# Patient Record
Sex: MALE | Race: WHITE
[De-identification: names, ages, dates, MRNs, and addresses within clinical notes are randomized per-mention and may not be internally consistent; named-entity substitution may affect disease eponyms.]

---

## 2017-01-05 ENCOUNTER — APPOINTMENT (OUTPATIENT)
Dept: PODIATRY | Facility: CLINIC | Age: 62
End: 2017-01-05

## 2017-01-19 ENCOUNTER — APPOINTMENT (OUTPATIENT)
Dept: PODIATRY | Facility: CLINIC | Age: 62
End: 2017-01-19

## 2017-02-02 ENCOUNTER — APPOINTMENT (OUTPATIENT)
Dept: PODIATRY | Facility: CLINIC | Age: 62
End: 2017-02-02

## 2017-02-02 VITALS — BODY MASS INDEX: 31.17 KG/M2 | HEIGHT: 76 IN | WEIGHT: 256 LBS

## 2017-02-16 ENCOUNTER — APPOINTMENT (OUTPATIENT)
Dept: PODIATRY | Facility: CLINIC | Age: 62
End: 2017-02-16

## 2017-02-16 VITALS — HEIGHT: 75 IN | WEIGHT: 253 LBS | BODY MASS INDEX: 31.46 KG/M2

## 2017-03-23 ENCOUNTER — APPOINTMENT (OUTPATIENT)
Dept: PODIATRY | Facility: CLINIC | Age: 62
End: 2017-03-23

## 2017-03-23 VITALS — BODY MASS INDEX: 31.33 KG/M2 | WEIGHT: 252 LBS | HEIGHT: 75 IN

## 2017-04-27 ENCOUNTER — OUTPATIENT (OUTPATIENT)
Dept: OUTPATIENT SERVICES | Facility: HOSPITAL | Age: 62
LOS: 1 days | Discharge: HOME | End: 2017-04-27

## 2017-04-27 ENCOUNTER — APPOINTMENT (OUTPATIENT)
Dept: PODIATRY | Facility: CLINIC | Age: 62
End: 2017-04-27

## 2017-06-28 DIAGNOSIS — L97.519 NON-PRESSURE CHRONIC ULCER OF OTHER PART OF RIGHT FOOT WITH UNSPECIFIED SEVERITY: ICD-10-CM

## 2017-06-28 DIAGNOSIS — I87.2 VENOUS INSUFFICIENCY (CHRONIC) (PERIPHERAL): ICD-10-CM

## 2017-06-28 DIAGNOSIS — B35.1 TINEA UNGUIUM: ICD-10-CM

## 2017-06-28 DIAGNOSIS — L85.3 XEROSIS CUTIS: ICD-10-CM

## 2017-06-28 DIAGNOSIS — M79.672 PAIN IN LEFT FOOT: ICD-10-CM

## 2017-06-28 DIAGNOSIS — M79.671 PAIN IN RIGHT FOOT: ICD-10-CM

## 2017-07-06 ENCOUNTER — APPOINTMENT (OUTPATIENT)
Dept: PODIATRY | Facility: CLINIC | Age: 62
End: 2017-07-06

## 2017-07-06 ENCOUNTER — OUTPATIENT (OUTPATIENT)
Dept: OUTPATIENT SERVICES | Facility: HOSPITAL | Age: 62
LOS: 1 days | Discharge: HOME | End: 2017-07-06

## 2017-07-06 VITALS — SYSTOLIC BLOOD PRESSURE: 132 MMHG | HEART RATE: 72 BPM | DIASTOLIC BLOOD PRESSURE: 77 MMHG

## 2017-07-12 DIAGNOSIS — L97.519 NON-PRESSURE CHRONIC ULCER OF OTHER PART OF RIGHT FOOT WITH UNSPECIFIED SEVERITY: ICD-10-CM

## 2017-07-12 DIAGNOSIS — L85.3 XEROSIS CUTIS: ICD-10-CM

## 2017-07-12 DIAGNOSIS — B35.1 TINEA UNGUIUM: ICD-10-CM

## 2017-07-12 DIAGNOSIS — I87.2 VENOUS INSUFFICIENCY (CHRONIC) (PERIPHERAL): ICD-10-CM

## 2017-09-07 ENCOUNTER — OUTPATIENT (OUTPATIENT)
Dept: OUTPATIENT SERVICES | Facility: HOSPITAL | Age: 62
LOS: 1 days | Discharge: HOME | End: 2017-09-07

## 2017-09-07 ENCOUNTER — APPOINTMENT (OUTPATIENT)
Dept: PODIATRY | Facility: CLINIC | Age: 62
End: 2017-09-07

## 2017-09-07 VITALS — HEART RATE: 77 BPM | SYSTOLIC BLOOD PRESSURE: 117 MMHG | DIASTOLIC BLOOD PRESSURE: 74 MMHG

## 2017-09-07 VITALS — BODY MASS INDEX: 31.33 KG/M2 | WEIGHT: 252 LBS | HEIGHT: 75 IN

## 2017-09-13 DIAGNOSIS — L85.3 XEROSIS CUTIS: ICD-10-CM

## 2017-09-13 DIAGNOSIS — L97.519 NON-PRESSURE CHRONIC ULCER OF OTHER PART OF RIGHT FOOT WITH UNSPECIFIED SEVERITY: ICD-10-CM

## 2017-09-13 DIAGNOSIS — B35.1 TINEA UNGUIUM: ICD-10-CM

## 2017-09-13 DIAGNOSIS — I87.2 VENOUS INSUFFICIENCY (CHRONIC) (PERIPHERAL): ICD-10-CM

## 2017-09-13 DIAGNOSIS — M79.671 PAIN IN RIGHT FOOT: ICD-10-CM

## 2017-09-13 DIAGNOSIS — M79.672 PAIN IN LEFT FOOT: ICD-10-CM

## 2017-10-27 ENCOUNTER — APPOINTMENT (OUTPATIENT)
Dept: GASTROENTEROLOGY | Facility: CLINIC | Age: 62
End: 2017-10-27

## 2017-10-27 ENCOUNTER — OUTPATIENT (OUTPATIENT)
Dept: OUTPATIENT SERVICES | Facility: HOSPITAL | Age: 62
LOS: 1 days | Discharge: HOME | End: 2017-10-27

## 2017-10-27 VITALS — BODY MASS INDEX: 31.71 KG/M2 | WEIGHT: 255 LBS | HEIGHT: 75 IN

## 2017-11-09 ENCOUNTER — APPOINTMENT (OUTPATIENT)
Dept: PODIATRY | Facility: CLINIC | Age: 62
End: 2017-11-09

## 2017-11-09 ENCOUNTER — OUTPATIENT (OUTPATIENT)
Dept: OUTPATIENT SERVICES | Facility: HOSPITAL | Age: 62
LOS: 1 days | Discharge: HOME | End: 2017-11-09

## 2017-11-09 VITALS
BODY MASS INDEX: 30.44 KG/M2 | HEART RATE: 66 BPM | WEIGHT: 250 LBS | SYSTOLIC BLOOD PRESSURE: 105 MMHG | DIASTOLIC BLOOD PRESSURE: 69 MMHG | HEIGHT: 76 IN

## 2017-11-10 DIAGNOSIS — L85.3 XEROSIS CUTIS: ICD-10-CM

## 2017-11-10 DIAGNOSIS — B35.1 TINEA UNGUIUM: ICD-10-CM

## 2017-11-10 DIAGNOSIS — M79.672 PAIN IN LEFT FOOT: ICD-10-CM

## 2017-11-10 DIAGNOSIS — M79.671 PAIN IN RIGHT FOOT: ICD-10-CM

## 2017-11-10 DIAGNOSIS — I87.2 VENOUS INSUFFICIENCY (CHRONIC) (PERIPHERAL): ICD-10-CM

## 2017-11-10 DIAGNOSIS — L97.519 NON-PRESSURE CHRONIC ULCER OF OTHER PART OF RIGHT FOOT WITH UNSPECIFIED SEVERITY: ICD-10-CM

## 2018-01-11 ENCOUNTER — OUTPATIENT (OUTPATIENT)
Dept: OUTPATIENT SERVICES | Facility: HOSPITAL | Age: 63
LOS: 1 days | Discharge: HOME | End: 2018-01-11

## 2018-01-12 ENCOUNTER — OUTPATIENT (OUTPATIENT)
Dept: OUTPATIENT SERVICES | Facility: HOSPITAL | Age: 63
LOS: 1 days | Discharge: HOME | End: 2018-01-12

## 2018-01-22 ENCOUNTER — OUTPATIENT (OUTPATIENT)
Dept: OUTPATIENT SERVICES | Facility: HOSPITAL | Age: 63
LOS: 1 days | Discharge: HOME | End: 2018-01-22

## 2018-01-22 ENCOUNTER — APPOINTMENT (OUTPATIENT)
Dept: PODIATRY | Facility: CLINIC | Age: 63
End: 2018-01-22

## 2018-01-22 VITALS
DIASTOLIC BLOOD PRESSURE: 82 MMHG | HEART RATE: 83 BPM | WEIGHT: 250 LBS | HEIGHT: 76 IN | BODY MASS INDEX: 30.44 KG/M2 | SYSTOLIC BLOOD PRESSURE: 131 MMHG

## 2018-01-22 DIAGNOSIS — L85.3 XEROSIS CUTIS: ICD-10-CM

## 2018-01-26 DIAGNOSIS — M79.672 PAIN IN LEFT FOOT: ICD-10-CM

## 2018-01-26 DIAGNOSIS — I87.2 VENOUS INSUFFICIENCY (CHRONIC) (PERIPHERAL): ICD-10-CM

## 2018-01-26 DIAGNOSIS — L97.519 NON-PRESSURE CHRONIC ULCER OF OTHER PART OF RIGHT FOOT WITH UNSPECIFIED SEVERITY: ICD-10-CM

## 2018-01-26 DIAGNOSIS — B35.1 TINEA UNGUIUM: ICD-10-CM

## 2018-01-26 DIAGNOSIS — M79.671 PAIN IN RIGHT FOOT: ICD-10-CM

## 2018-01-26 DIAGNOSIS — L85.3 XEROSIS CUTIS: ICD-10-CM

## 2018-03-19 ENCOUNTER — OUTPATIENT (OUTPATIENT)
Dept: OUTPATIENT SERVICES | Facility: HOSPITAL | Age: 63
LOS: 1 days | Discharge: HOME | End: 2018-03-19

## 2018-03-19 ENCOUNTER — APPOINTMENT (OUTPATIENT)
Dept: PODIATRY | Facility: CLINIC | Age: 63
End: 2018-03-19
Payer: MEDICAID

## 2018-03-19 VITALS
SYSTOLIC BLOOD PRESSURE: 132 MMHG | WEIGHT: 256 LBS | HEIGHT: 76 IN | DIASTOLIC BLOOD PRESSURE: 80 MMHG | HEART RATE: 74 BPM | BODY MASS INDEX: 31.17 KG/M2

## 2018-03-19 PROCEDURE — 11721 DEBRIDE NAIL 6 OR MORE: CPT | Mod: NC

## 2018-03-23 DIAGNOSIS — B35.1 TINEA UNGUIUM: ICD-10-CM

## 2018-03-23 DIAGNOSIS — G89.29 OTHER CHRONIC PAIN: ICD-10-CM

## 2018-05-21 ENCOUNTER — OUTPATIENT (OUTPATIENT)
Dept: OUTPATIENT SERVICES | Facility: HOSPITAL | Age: 63
LOS: 1 days | Discharge: HOME | End: 2018-05-21

## 2018-05-21 ENCOUNTER — APPOINTMENT (OUTPATIENT)
Dept: PODIATRY | Facility: CLINIC | Age: 63
End: 2018-05-21
Payer: MEDICAID

## 2018-05-21 VITALS
SYSTOLIC BLOOD PRESSURE: 130 MMHG | BODY MASS INDEX: 30.69 KG/M2 | WEIGHT: 252 LBS | HEIGHT: 76 IN | HEART RATE: 77 BPM | DIASTOLIC BLOOD PRESSURE: 78 MMHG

## 2018-05-21 DIAGNOSIS — M79.674 PAIN IN RIGHT TOE(S): ICD-10-CM

## 2018-05-21 PROCEDURE — 99213 OFFICE O/P EST LOW 20 MIN: CPT

## 2018-05-24 ENCOUNTER — OTHER (OUTPATIENT)
Age: 63
End: 2018-05-24

## 2018-05-30 ENCOUNTER — APPOINTMENT (OUTPATIENT)
Dept: VASCULAR SURGERY | Facility: CLINIC | Age: 63
End: 2018-05-30
Payer: MEDICAID

## 2018-05-30 DIAGNOSIS — Q82.8 OTHER SPECIFIED CONGENITAL MALFORMATIONS OF SKIN: ICD-10-CM

## 2018-05-30 DIAGNOSIS — M79.675 PAIN IN LEFT TOE(S): ICD-10-CM

## 2018-05-30 DIAGNOSIS — M79.674 PAIN IN RIGHT TOE(S): ICD-10-CM

## 2018-05-30 PROCEDURE — 99203 OFFICE O/P NEW LOW 30 MIN: CPT

## 2018-05-31 ENCOUNTER — OUTPATIENT (OUTPATIENT)
Dept: OUTPATIENT SERVICES | Facility: HOSPITAL | Age: 63
LOS: 1 days | Discharge: HOME | End: 2018-05-31

## 2018-05-31 DIAGNOSIS — L89.899 PRESSURE ULCER OF OTHER SITE, UNSPECIFIED STAGE: ICD-10-CM

## 2018-06-04 ENCOUNTER — OUTPATIENT (OUTPATIENT)
Dept: OUTPATIENT SERVICES | Facility: HOSPITAL | Age: 63
LOS: 1 days | Discharge: HOME | End: 2018-06-04

## 2018-06-04 ENCOUNTER — APPOINTMENT (OUTPATIENT)
Dept: PODIATRY | Facility: CLINIC | Age: 63
End: 2018-06-04
Payer: MEDICAID

## 2018-06-04 VITALS
BODY MASS INDEX: 31.05 KG/M2 | DIASTOLIC BLOOD PRESSURE: 77 MMHG | WEIGHT: 255 LBS | SYSTOLIC BLOOD PRESSURE: 125 MMHG | HEIGHT: 76 IN | HEART RATE: 80 BPM | RESPIRATION RATE: 18 BRPM

## 2018-06-04 PROCEDURE — 10060 I&D ABSCESS SIMPLE/SINGLE: CPT

## 2018-06-04 RX ORDER — SILVER SULFADIAZINE 10 MG/G
1 CREAM TOPICAL DAILY
Qty: 1 | Refills: 2 | Status: ACTIVE | COMMUNITY
Start: 2018-06-04 | End: 1900-01-01

## 2018-06-12 DIAGNOSIS — L02.611 CUTANEOUS ABSCESS OF RIGHT FOOT: ICD-10-CM

## 2018-06-18 ENCOUNTER — OUTPATIENT (OUTPATIENT)
Dept: OUTPATIENT SERVICES | Facility: HOSPITAL | Age: 63
LOS: 1 days | Discharge: HOME | End: 2018-06-18

## 2018-06-18 ENCOUNTER — APPOINTMENT (OUTPATIENT)
Dept: PODIATRY | Facility: CLINIC | Age: 63
End: 2018-06-18
Payer: MEDICAID

## 2018-06-18 VITALS
HEIGHT: 76 IN | BODY MASS INDEX: 30.93 KG/M2 | SYSTOLIC BLOOD PRESSURE: 122 MMHG | WEIGHT: 254 LBS | DIASTOLIC BLOOD PRESSURE: 79 MMHG | HEART RATE: 81 BPM

## 2018-06-18 LAB — BACTERIA WND CULT: ABNORMAL

## 2018-06-18 PROCEDURE — 11042 DBRDMT SUBQ TIS 1ST 20SQCM/<: CPT

## 2018-06-20 ENCOUNTER — APPOINTMENT (OUTPATIENT)
Dept: VASCULAR SURGERY | Facility: CLINIC | Age: 63
End: 2018-06-20
Payer: MEDICAID

## 2018-06-20 PROCEDURE — 99213 OFFICE O/P EST LOW 20 MIN: CPT

## 2018-06-26 ENCOUNTER — OUTPATIENT (OUTPATIENT)
Dept: OUTPATIENT SERVICES | Facility: HOSPITAL | Age: 63
LOS: 1 days | Discharge: HOME | End: 2018-06-26

## 2018-06-26 VITALS
OXYGEN SATURATION: 99 % | HEIGHT: 76 IN | SYSTOLIC BLOOD PRESSURE: 134 MMHG | DIASTOLIC BLOOD PRESSURE: 73 MMHG | WEIGHT: 265.88 LBS | HEART RATE: 63 BPM | RESPIRATION RATE: 17 BRPM

## 2018-06-26 DIAGNOSIS — Z01.818 ENCOUNTER FOR OTHER PREPROCEDURAL EXAMINATION: ICD-10-CM

## 2018-06-26 DIAGNOSIS — I73.9 PERIPHERAL VASCULAR DISEASE, UNSPECIFIED: ICD-10-CM

## 2018-06-26 LAB
ANION GAP SERPL CALC-SCNC: 16 MMOL/L — HIGH (ref 7–14)
APTT BLD: 36.5 SEC — SIGNIFICANT CHANGE UP (ref 27–39.2)
BASOPHILS # BLD AUTO: 0.01 K/UL — SIGNIFICANT CHANGE UP (ref 0–0.2)
BASOPHILS NFR BLD AUTO: 0.1 % — SIGNIFICANT CHANGE UP (ref 0–1)
BUN SERPL-MCNC: 14 MG/DL — SIGNIFICANT CHANGE UP (ref 10–20)
CALCIUM SERPL-MCNC: 9.3 MG/DL — SIGNIFICANT CHANGE UP (ref 8.5–10.1)
CHLORIDE SERPL-SCNC: 98 MMOL/L — SIGNIFICANT CHANGE UP (ref 98–110)
CO2 SERPL-SCNC: 26 MMOL/L — SIGNIFICANT CHANGE UP (ref 17–32)
CREAT SERPL-MCNC: 0.8 MG/DL — SIGNIFICANT CHANGE UP (ref 0.7–1.5)
EOSINOPHIL # BLD AUTO: 0.06 K/UL — SIGNIFICANT CHANGE UP (ref 0–0.7)
EOSINOPHIL NFR BLD AUTO: 0.8 % — SIGNIFICANT CHANGE UP (ref 0–8)
GLUCOSE SERPL-MCNC: 90 MG/DL — SIGNIFICANT CHANGE UP (ref 70–99)
HCT VFR BLD CALC: 42.1 % — SIGNIFICANT CHANGE UP (ref 42–52)
HGB BLD-MCNC: 14.5 G/DL — SIGNIFICANT CHANGE UP (ref 14–18)
IMM GRANULOCYTES NFR BLD AUTO: 0.3 % — SIGNIFICANT CHANGE UP (ref 0.1–0.3)
INR BLD: 1.17 RATIO — SIGNIFICANT CHANGE UP (ref 0.65–1.3)
LYMPHOCYTES # BLD AUTO: 2.11 K/UL — SIGNIFICANT CHANGE UP (ref 1.2–3.4)
LYMPHOCYTES # BLD AUTO: 26.4 % — SIGNIFICANT CHANGE UP (ref 20.5–51.1)
MCHC RBC-ENTMCNC: 30.3 PG — SIGNIFICANT CHANGE UP (ref 27–31)
MCHC RBC-ENTMCNC: 34.4 G/DL — SIGNIFICANT CHANGE UP (ref 32–37)
MCV RBC AUTO: 88.1 FL — SIGNIFICANT CHANGE UP (ref 80–94)
MONOCYTES # BLD AUTO: 0.75 K/UL — HIGH (ref 0.1–0.6)
MONOCYTES NFR BLD AUTO: 9.4 % — HIGH (ref 1.7–9.3)
NEUTROPHILS # BLD AUTO: 5.03 K/UL — SIGNIFICANT CHANGE UP (ref 1.4–6.5)
NEUTROPHILS NFR BLD AUTO: 63 % — SIGNIFICANT CHANGE UP (ref 42.2–75.2)
NRBC # BLD: 0 /100 WBCS — SIGNIFICANT CHANGE UP (ref 0–0)
PLATELET # BLD AUTO: 229 K/UL — SIGNIFICANT CHANGE UP (ref 130–400)
POTASSIUM SERPL-MCNC: 3.9 MMOL/L — SIGNIFICANT CHANGE UP (ref 3.5–5)
POTASSIUM SERPL-SCNC: 3.9 MMOL/L — SIGNIFICANT CHANGE UP (ref 3.5–5)
PROTHROM AB SERPL-ACNC: 12.6 SEC — SIGNIFICANT CHANGE UP (ref 9.95–12.87)
RBC # BLD: 4.78 M/UL — SIGNIFICANT CHANGE UP (ref 4.7–6.1)
RBC # FLD: 13.2 % — SIGNIFICANT CHANGE UP (ref 11.5–14.5)
SODIUM SERPL-SCNC: 140 MMOL/L — SIGNIFICANT CHANGE UP (ref 135–146)
WBC # BLD: 7.98 K/UL — SIGNIFICANT CHANGE UP (ref 4.8–10.8)
WBC # FLD AUTO: 7.98 K/UL — SIGNIFICANT CHANGE UP (ref 4.8–10.8)

## 2018-06-26 NOTE — H&P PST ADULT - REASON FOR ADMISSION
PT RESIDES AT  GROUP [facility] AT [facility], [place].   PT ARRIVED BY HIMSELF. PT DENIES HAVING SOB, CP, PALPITATIONS, DYSURIA, UTI, URI AT PRESENT  EXERCISE TOLERANCE  2-3 FOS NO SOB.  PT USES A WALKER.  PT DENIES HAVING ANY RASHES, BRUISES OR OPEN WOUNDS AT PRESENT.  AS PER THE PT, THIS IS HIS/HER COMPLETE MEDICAL AND SURGICAL HX, INCLUDING MEDICATIONS PRESCRIBED AND OVER THE COUNTER  PT PRESENTS TO PAST WITH H/O-  RIGHT LOWER EXTREMITY DISCOMFORT.

## 2018-06-26 NOTE — H&P PST ADULT - ADDITIONAL PE
rt foot ulcer- covered with dressing.   no ozing noted.   pt using silver sulfa ointment daily.    as p/pt -- foot ulcer is healing.

## 2018-06-26 NOTE — H&P PST ADULT - EXTREMITIES COMMENTS
RT ANKLE - 45 DEGREES TO THE RIGHT.   PT DX WITH CHARCOT RT ANKLE. RT ANKLE - 45 DEGREES TO THE RIGHT.   PT DX WITH CHARCOT RT ANKLE.  B/L ANKLE +2-PITTING EDEMA.

## 2018-06-26 NOTE — H&P PST ADULT - NSANTHOSAYNRD_GEN_A_CORE
No. GLO screening performed.  STOP BANG Legend: 0-2 = LOW Risk; 3-4 = INTERMEDIATE Risk; 5-8 = HIGH Risk

## 2018-07-02 ENCOUNTER — OUTPATIENT (OUTPATIENT)
Dept: OUTPATIENT SERVICES | Facility: HOSPITAL | Age: 63
LOS: 1 days | Discharge: HOME | End: 2018-07-02

## 2018-07-02 ENCOUNTER — APPOINTMENT (OUTPATIENT)
Dept: PODIATRY | Facility: CLINIC | Age: 63
End: 2018-07-02
Payer: MEDICAID

## 2018-07-02 VITALS
SYSTOLIC BLOOD PRESSURE: 127 MMHG | HEIGHT: 76 IN | HEART RATE: 83 BPM | BODY MASS INDEX: 30.44 KG/M2 | DIASTOLIC BLOOD PRESSURE: 82 MMHG | WEIGHT: 250 LBS

## 2018-07-02 PROCEDURE — 97597 DBRDMT OPN WND 1ST 20 CM/<: CPT | Mod: NC

## 2018-07-09 ENCOUNTER — OUTPATIENT (OUTPATIENT)
Dept: OUTPATIENT SERVICES | Facility: HOSPITAL | Age: 63
LOS: 1 days | Discharge: HOME | End: 2018-07-09

## 2018-07-09 ENCOUNTER — APPOINTMENT (OUTPATIENT)
Dept: VASCULAR SURGERY | Facility: HOSPITAL | Age: 63
End: 2018-07-09
Payer: MEDICAID

## 2018-07-09 VITALS
DIASTOLIC BLOOD PRESSURE: 80 MMHG | HEART RATE: 58 BPM | SYSTOLIC BLOOD PRESSURE: 132 MMHG | RESPIRATION RATE: 18 BRPM | OXYGEN SATURATION: 99 %

## 2018-07-09 VITALS
WEIGHT: 255.07 LBS | TEMPERATURE: 98 F | RESPIRATION RATE: 20 BRPM | DIASTOLIC BLOOD PRESSURE: 79 MMHG | HEART RATE: 71 BPM | HEIGHT: 76 IN | SYSTOLIC BLOOD PRESSURE: 130 MMHG

## 2018-07-09 LAB
ABO RH CONFIRMATION: SIGNIFICANT CHANGE UP
BLD GP AB SCN SERPL QL: SIGNIFICANT CHANGE UP
TYPE + AB SCN PNL BLD: SIGNIFICANT CHANGE UP

## 2018-07-09 PROCEDURE — 76937 US GUIDE VASCULAR ACCESS: CPT | Mod: 26

## 2018-07-09 PROCEDURE — 36247 INS CATH ABD/L-EXT ART 3RD: CPT | Mod: RT

## 2018-07-09 PROCEDURE — 75710 ARTERY X-RAYS ARM/LEG: CPT | Mod: 26

## 2018-07-09 PROCEDURE — 75625 CONTRAST EXAM ABDOMINL AORTA: CPT | Mod: 26

## 2018-07-09 RX ORDER — OXYCODONE AND ACETAMINOPHEN 5; 325 MG/1; MG/1
1 TABLET ORAL EVERY 4 HOURS
Qty: 0 | Refills: 0 | Status: DISCONTINUED | OUTPATIENT
Start: 2018-07-09 | End: 2018-07-10

## 2018-07-09 RX ORDER — SODIUM CHLORIDE 9 MG/ML
1000 INJECTION, SOLUTION INTRAVENOUS
Qty: 0 | Refills: 0 | Status: DISCONTINUED | OUTPATIENT
Start: 2018-07-09 | End: 2018-07-10

## 2018-07-09 RX ORDER — MORPHINE SULFATE 50 MG/1
4 CAPSULE, EXTENDED RELEASE ORAL
Qty: 0 | Refills: 0 | Status: DISCONTINUED | OUTPATIENT
Start: 2018-07-09 | End: 2018-07-10

## 2018-07-09 RX ORDER — OXYCODONE AND ACETAMINOPHEN 5; 325 MG/1; MG/1
2 TABLET ORAL EVERY 6 HOURS
Qty: 0 | Refills: 0 | Status: DISCONTINUED | OUTPATIENT
Start: 2018-07-09 | End: 2018-07-10

## 2018-07-09 RX ORDER — MORPHINE SULFATE 50 MG/1
2 CAPSULE, EXTENDED RELEASE ORAL
Qty: 0 | Refills: 0 | Status: DISCONTINUED | OUTPATIENT
Start: 2018-07-09 | End: 2018-07-10

## 2018-07-09 RX ORDER — ONDANSETRON 8 MG/1
4 TABLET, FILM COATED ORAL ONCE
Qty: 0 | Refills: 0 | Status: DISCONTINUED | OUTPATIENT
Start: 2018-07-09 | End: 2018-07-10

## 2018-07-09 RX ADMIN — SODIUM CHLORIDE 100 MILLILITER(S): 9 INJECTION, SOLUTION INTRAVENOUS at 09:00

## 2018-07-09 NOTE — ASU DISCHARGE PLAN (ADULT/PEDIATRIC). - NOTIFY
Pain not relieved by Medications/Bleeding that does not stop/Numbness, color, or temperature change to extremity/Swelling that continues/Fever greater than 101

## 2018-07-09 NOTE — PRE-ANESTHESIA EVALUATION ADULT - NSANTHADDINFOFT_GEN_ALL_CORE
MAC vs general. discussed with the patient all the risks, benefits, alternatives, complications. all questions answered. willing to proceed

## 2018-07-09 NOTE — BRIEF OPERATIVE NOTE - PROCEDURE
<<-----Click on this checkbox to enter Procedure Angiogram, peripheral  07/09/2018  Right leg angiogram via US-guided left femoral access; diagnostic third-order selective angiogram; perclose closure of arteriotomy  Active  SAMUEL

## 2018-07-09 NOTE — CHART NOTE - NSCHARTNOTEFT_GEN_A_CORE
PACU ANESTHESIA ADMISSION NOTE      Procedure:   Post op diagnosis:      ____  Intubated  TV:______       Rate: ______      FiO2: ______    _x___  Patent Airway    _x___  Full return of protective reflexes    _x___  Full recovery from anesthesia / back to baseline status    Vitals:            T:    97.9            BP :    126/62            R: 18             Sat:  98%             P: 74    Mental Status:  _x___ Awake   _____ Alert   _____ Drowsy   _____ Sedated    Nausea/Vomiting:  _x___  NO       ______Yes,   See Post - Op Orders         Pain Scale (0-10):  __0___    Treatment: _x___ None    ____ See Post - Op/PCA Orders    Post - Operative Fluids:   __x__ Oral   ____ See Post - Op Orders    Plan: Discharge:   _x___Home       _____Floor     _____Critical Care    _____  Other:_________________    Comments:  No anesthesia issues or complications noted.  Discharge when criteria met.

## 2018-07-09 NOTE — BRIEF OPERATIVE NOTE - OPERATION/FINDINGS
Widely patent right external iliac, common femoral, superficial femoral, and popliteal arteries. Patent tibioperoneal trunk; occluded AT with no reconstitution. Patent PT and peroneal to ankle; PT continues to foot. No focal stenoses. (2-vessel runoff to foot).

## 2018-07-16 DIAGNOSIS — Z88.1 ALLERGY STATUS TO OTHER ANTIBIOTIC AGENTS STATUS: ICD-10-CM

## 2018-07-16 DIAGNOSIS — E78.00 PURE HYPERCHOLESTEROLEMIA, UNSPECIFIED: ICD-10-CM

## 2018-07-16 DIAGNOSIS — L97.519 NON-PRESSURE CHRONIC ULCER OF OTHER PART OF RIGHT FOOT WITH UNSPECIFIED SEVERITY: ICD-10-CM

## 2018-07-16 DIAGNOSIS — I70.235 ATHEROSCLEROSIS OF NATIVE ARTERIES OF RIGHT LEG WITH ULCERATION OF OTHER PART OF FOOT: ICD-10-CM

## 2018-07-16 DIAGNOSIS — I10 ESSENTIAL (PRIMARY) HYPERTENSION: ICD-10-CM

## 2018-07-23 ENCOUNTER — OUTPATIENT (OUTPATIENT)
Dept: OUTPATIENT SERVICES | Facility: HOSPITAL | Age: 63
LOS: 1 days | Discharge: HOME | End: 2018-07-23

## 2018-07-23 ENCOUNTER — APPOINTMENT (OUTPATIENT)
Dept: PODIATRY | Facility: CLINIC | Age: 63
End: 2018-07-23
Payer: MEDICAID

## 2018-07-23 VITALS
WEIGHT: 248 LBS | BODY MASS INDEX: 30.2 KG/M2 | HEART RATE: 75 BPM | HEIGHT: 76 IN | SYSTOLIC BLOOD PRESSURE: 119 MMHG | DIASTOLIC BLOOD PRESSURE: 77 MMHG

## 2018-07-23 PROBLEM — N40.0 BENIGN PROSTATIC HYPERPLASIA WITHOUT LOWER URINARY TRACT SYMPTOMS: Chronic | Status: ACTIVE | Noted: 2018-06-26

## 2018-07-23 PROBLEM — E78.00 PURE HYPERCHOLESTEROLEMIA, UNSPECIFIED: Chronic | Status: ACTIVE | Noted: 2018-06-26

## 2018-07-23 PROBLEM — M14.671 CHARCOT'S JOINT, RIGHT ANKLE AND FOOT: Chronic | Status: ACTIVE | Noted: 2018-06-26

## 2018-07-23 PROBLEM — I10 ESSENTIAL (PRIMARY) HYPERTENSION: Chronic | Status: ACTIVE | Noted: 2018-06-26

## 2018-07-23 PROCEDURE — 97597 DBRDMT OPN WND 1ST 20 CM/<: CPT | Mod: NC

## 2018-07-30 ENCOUNTER — APPOINTMENT (OUTPATIENT)
Dept: PODIATRY | Facility: CLINIC | Age: 63
End: 2018-07-30
Payer: MEDICAID

## 2018-07-30 ENCOUNTER — OUTPATIENT (OUTPATIENT)
Dept: OUTPATIENT SERVICES | Facility: HOSPITAL | Age: 63
LOS: 1 days | Discharge: HOME | End: 2018-07-30

## 2018-07-30 PROCEDURE — 29540 STRAPPING ANKLE &/FOOT: CPT | Mod: NC,RT

## 2018-07-31 DIAGNOSIS — I73.9 PERIPHERAL VASCULAR DISEASE, UNSPECIFIED: ICD-10-CM

## 2018-07-31 DIAGNOSIS — L89.892 PRESSURE ULCER OF OTHER SITE, STAGE 2: ICD-10-CM

## 2018-08-01 ENCOUNTER — APPOINTMENT (OUTPATIENT)
Dept: VASCULAR SURGERY | Facility: CLINIC | Age: 63
End: 2018-08-01
Payer: MEDICAID

## 2018-08-01 PROCEDURE — 99213 OFFICE O/P EST LOW 20 MIN: CPT

## 2018-08-03 DIAGNOSIS — L97.519 NON-PRESSURE CHRONIC ULCER OF OTHER PART OF RIGHT FOOT WITH UNSPECIFIED SEVERITY: ICD-10-CM

## 2018-08-03 DIAGNOSIS — I73.9 PERIPHERAL VASCULAR DISEASE, UNSPECIFIED: ICD-10-CM

## 2018-08-03 DIAGNOSIS — L02.611 CUTANEOUS ABSCESS OF RIGHT FOOT: ICD-10-CM

## 2018-08-13 ENCOUNTER — OUTPATIENT (OUTPATIENT)
Dept: OUTPATIENT SERVICES | Facility: HOSPITAL | Age: 63
LOS: 1 days | Discharge: HOME | End: 2018-08-13

## 2018-08-13 ENCOUNTER — APPOINTMENT (OUTPATIENT)
Dept: PODIATRY | Facility: CLINIC | Age: 63
End: 2018-08-13
Payer: MEDICAID

## 2018-08-13 VITALS
BODY MASS INDEX: 30.32 KG/M2 | WEIGHT: 249 LBS | SYSTOLIC BLOOD PRESSURE: 116 MMHG | HEART RATE: 78 BPM | HEIGHT: 76 IN | DIASTOLIC BLOOD PRESSURE: 75 MMHG

## 2018-08-13 PROCEDURE — 97597 DBRDMT OPN WND 1ST 20 CM/<: CPT | Mod: NC

## 2018-08-19 NOTE — ASU PREOP CHECKLIST - ALLERGIES REVIEWED
pt to ed co pain to right  flank for past few hours getting worse 8/10 no radiation no alleviating factors onset sudden sharp pain no fever no dizzy no headache no chills  no chest pain no SOB no shakes no aches no other  injury no other complaints . +NV no diarrhea slight sweats severe pain
done

## 2018-09-06 ENCOUNTER — OUTPATIENT (OUTPATIENT)
Dept: OUTPATIENT SERVICES | Facility: HOSPITAL | Age: 63
LOS: 1 days | Discharge: HOME | End: 2018-09-06

## 2018-09-06 ENCOUNTER — APPOINTMENT (OUTPATIENT)
Dept: PODIATRY | Facility: CLINIC | Age: 63
End: 2018-09-06
Payer: MEDICAID

## 2018-09-06 VITALS
DIASTOLIC BLOOD PRESSURE: 74 MMHG | BODY MASS INDEX: 30.32 KG/M2 | SYSTOLIC BLOOD PRESSURE: 147 MMHG | HEIGHT: 76 IN | HEART RATE: 76 BPM | WEIGHT: 249 LBS

## 2018-09-06 PROCEDURE — 97597 DBRDMT OPN WND 1ST 20 CM/<: CPT | Mod: NC,59,RT

## 2018-09-06 PROCEDURE — 11721 DEBRIDE NAIL 6 OR MORE: CPT | Mod: NC

## 2018-09-12 DIAGNOSIS — L89.892 PRESSURE ULCER OF OTHER SITE, STAGE 2: ICD-10-CM

## 2018-09-12 DIAGNOSIS — R26.2 DIFFICULTY IN WALKING, NOT ELSEWHERE CLASSIFIED: ICD-10-CM

## 2018-09-12 DIAGNOSIS — B35.1 TINEA UNGUIUM: ICD-10-CM

## 2018-09-12 DIAGNOSIS — I73.9 PERIPHERAL VASCULAR DISEASE, UNSPECIFIED: ICD-10-CM

## 2018-09-13 ENCOUNTER — APPOINTMENT (OUTPATIENT)
Dept: PODIATRY | Facility: CLINIC | Age: 63
End: 2018-09-13
Payer: MEDICAID

## 2018-09-13 ENCOUNTER — OUTPATIENT (OUTPATIENT)
Dept: OUTPATIENT SERVICES | Facility: HOSPITAL | Age: 63
LOS: 1 days | Discharge: HOME | End: 2018-09-13

## 2018-09-13 PROCEDURE — 97760 ORTHOTIC MGMT&TRAING 1ST ENC: CPT | Mod: NC

## 2018-09-25 ENCOUNTER — APPOINTMENT (OUTPATIENT)
Dept: PODIATRY | Facility: CLINIC | Age: 63
End: 2018-09-25
Payer: MEDICAID

## 2018-09-25 ENCOUNTER — OUTPATIENT (OUTPATIENT)
Dept: OUTPATIENT SERVICES | Facility: HOSPITAL | Age: 63
LOS: 1 days | Discharge: HOME | End: 2018-09-25

## 2018-09-25 PROCEDURE — 97763 ORTHC/PROSTC MGMT SBSQ ENC: CPT | Mod: NC

## 2018-09-28 DIAGNOSIS — L97.519 NON-PRESSURE CHRONIC ULCER OF OTHER PART OF RIGHT FOOT WITH UNSPECIFIED SEVERITY: ICD-10-CM

## 2018-09-28 DIAGNOSIS — L02.611 CUTANEOUS ABSCESS OF RIGHT FOOT: ICD-10-CM

## 2018-09-28 DIAGNOSIS — R26.2 DIFFICULTY IN WALKING, NOT ELSEWHERE CLASSIFIED: ICD-10-CM

## 2018-10-16 ENCOUNTER — OUTPATIENT (OUTPATIENT)
Dept: OUTPATIENT SERVICES | Facility: HOSPITAL | Age: 63
LOS: 1 days | Discharge: HOME | End: 2018-10-16

## 2018-10-16 ENCOUNTER — APPOINTMENT (OUTPATIENT)
Dept: PODIATRY | Facility: CLINIC | Age: 63
End: 2018-10-16
Payer: MEDICAID

## 2018-10-16 PROCEDURE — 97763 ORTHC/PROSTC MGMT SBSQ ENC: CPT | Mod: NC

## 2018-10-23 DIAGNOSIS — R26.2 DIFFICULTY IN WALKING, NOT ELSEWHERE CLASSIFIED: ICD-10-CM

## 2018-10-23 DIAGNOSIS — M79.671 PAIN IN RIGHT FOOT: ICD-10-CM

## 2018-10-23 DIAGNOSIS — L97.519 NON-PRESSURE CHRONIC ULCER OF OTHER PART OF RIGHT FOOT WITH UNSPECIFIED SEVERITY: ICD-10-CM

## 2018-10-30 ENCOUNTER — APPOINTMENT (OUTPATIENT)
Dept: PODIATRY | Facility: CLINIC | Age: 63
End: 2018-10-30

## 2018-11-13 ENCOUNTER — OUTPATIENT (OUTPATIENT)
Dept: OUTPATIENT SERVICES | Facility: HOSPITAL | Age: 63
LOS: 1 days | Discharge: HOME | End: 2018-11-13

## 2018-11-13 ENCOUNTER — APPOINTMENT (OUTPATIENT)
Dept: PODIATRY | Facility: CLINIC | Age: 63
End: 2018-11-13
Payer: MEDICAID

## 2018-11-13 PROCEDURE — 97760 ORTHOTIC MGMT&TRAING 1ST ENC: CPT | Mod: NC

## 2018-11-20 DIAGNOSIS — L02.611 CUTANEOUS ABSCESS OF RIGHT FOOT: ICD-10-CM

## 2018-11-20 DIAGNOSIS — M79.671 PAIN IN RIGHT FOOT: ICD-10-CM

## 2018-11-20 DIAGNOSIS — L97.519 NON-PRESSURE CHRONIC ULCER OF OTHER PART OF RIGHT FOOT WITH UNSPECIFIED SEVERITY: ICD-10-CM

## 2018-11-27 ENCOUNTER — APPOINTMENT (OUTPATIENT)
Dept: PODIATRY | Facility: CLINIC | Age: 63
End: 2018-11-27
Payer: MEDICAID

## 2018-11-27 ENCOUNTER — OUTPATIENT (OUTPATIENT)
Dept: OUTPATIENT SERVICES | Facility: HOSPITAL | Age: 63
LOS: 1 days | Discharge: HOME | End: 2018-11-27

## 2018-11-27 DIAGNOSIS — I73.9 PERIPHERAL VASCULAR DISEASE, UNSPECIFIED: ICD-10-CM

## 2018-11-27 DIAGNOSIS — M77.52 OTHER ENTHESOPATHY OF LT FOOT AND ANKLE: ICD-10-CM

## 2018-11-27 PROCEDURE — 97763 ORTHC/PROSTC MGMT SBSQ ENC: CPT | Mod: NC

## 2018-11-27 PROCEDURE — 99212 OFFICE O/P EST SF 10 MIN: CPT | Mod: NC,25

## 2018-11-28 PROBLEM — I73.9 PVD (PERIPHERAL VASCULAR DISEASE): Status: ACTIVE | Noted: 2018-05-30

## 2018-11-28 PROBLEM — M77.52 CAPSULITIS OF METATARSOPHALANGEAL (MTP) JOINT OF LEFT FOOT: Status: ACTIVE | Noted: 2018-11-28

## 2018-12-07 DIAGNOSIS — I73.9 PERIPHERAL VASCULAR DISEASE, UNSPECIFIED: ICD-10-CM

## 2018-12-07 DIAGNOSIS — M79.674 PAIN IN RIGHT TOE(S): ICD-10-CM

## 2018-12-07 DIAGNOSIS — M77.52 OTHER ENTHESOPATHY OF LEFT FOOT AND ANKLE: ICD-10-CM

## 2018-12-07 DIAGNOSIS — Z87.2 PERSONAL HISTORY OF DISEASES OF THE SKIN AND SUBCUTANEOUS TISSUE: ICD-10-CM

## 2018-12-18 ENCOUNTER — APPOINTMENT (OUTPATIENT)
Dept: PODIATRY | Facility: CLINIC | Age: 63
End: 2018-12-18
Payer: MEDICAID

## 2018-12-18 ENCOUNTER — OUTPATIENT (OUTPATIENT)
Dept: OUTPATIENT SERVICES | Facility: HOSPITAL | Age: 63
LOS: 1 days | Discharge: HOME | End: 2018-12-18

## 2018-12-18 PROCEDURE — 99211 OFF/OP EST MAY X REQ PHY/QHP: CPT | Mod: NC

## 2018-12-20 ENCOUNTER — OUTPATIENT (OUTPATIENT)
Dept: OUTPATIENT SERVICES | Facility: HOSPITAL | Age: 63
LOS: 1 days | Discharge: HOME | End: 2018-12-20

## 2018-12-20 ENCOUNTER — APPOINTMENT (OUTPATIENT)
Dept: PODIATRY | Facility: CLINIC | Age: 63
End: 2018-12-20
Payer: MEDICAID

## 2018-12-20 DIAGNOSIS — Z87.2 PERSONAL HISTORY OF DISEASES OF THE SKIN AND SUBCUTANEOUS TISSUE: ICD-10-CM

## 2018-12-20 DIAGNOSIS — L02.611 CUTANEOUS ABSCESS OF RIGHT FOOT: ICD-10-CM

## 2018-12-20 PROCEDURE — G0168: CPT

## 2018-12-20 PROCEDURE — 12001 RPR S/N/AX/GEN/TRNK 2.5CM/<: CPT | Mod: 59

## 2018-12-21 PROBLEM — L02.611 ABSCESS OF TOE OF RIGHT FOOT: Status: ACTIVE | Noted: 2018-06-04

## 2018-12-27 ENCOUNTER — OUTPATIENT (OUTPATIENT)
Dept: OUTPATIENT SERVICES | Facility: HOSPITAL | Age: 63
LOS: 1 days | Discharge: HOME | End: 2018-12-27

## 2018-12-27 ENCOUNTER — APPOINTMENT (OUTPATIENT)
Dept: PODIATRY | Facility: CLINIC | Age: 63
End: 2018-12-27
Payer: MEDICAID

## 2018-12-27 PROCEDURE — 99212 OFFICE O/P EST SF 10 MIN: CPT | Mod: NC

## 2018-12-28 DIAGNOSIS — M79.671 PAIN IN RIGHT FOOT: ICD-10-CM

## 2018-12-28 DIAGNOSIS — M79.672 PAIN IN LEFT FOOT: ICD-10-CM

## 2018-12-28 DIAGNOSIS — Z87.2 PERSONAL HISTORY OF DISEASES OF THE SKIN AND SUBCUTANEOUS TISSUE: ICD-10-CM

## 2019-01-01 ENCOUNTER — INPATIENT (INPATIENT)
Facility: HOSPITAL | Age: 64
LOS: 2 days | Discharge: NURSING, REHAB OR LONG-TERM CARE | End: 2019-01-04
Attending: HOSPITALIST | Admitting: HOSPITALIST
Payer: MEDICAID

## 2019-01-01 VITALS
DIASTOLIC BLOOD PRESSURE: 78 MMHG | RESPIRATION RATE: 20 BRPM | TEMPERATURE: 97 F | OXYGEN SATURATION: 97 % | SYSTOLIC BLOOD PRESSURE: 130 MMHG | HEART RATE: 77 BPM

## 2019-01-01 LAB
ALBUMIN SERPL ELPH-MCNC: 3.7 G/DL — SIGNIFICANT CHANGE UP (ref 3.5–5.2)
ALP SERPL-CCNC: 54 U/L — SIGNIFICANT CHANGE UP (ref 30–115)
ALT FLD-CCNC: 25 U/L — SIGNIFICANT CHANGE UP (ref 0–41)
ANION GAP SERPL CALC-SCNC: 15 MMOL/L — HIGH (ref 7–14)
APTT BLD: 32 SEC — SIGNIFICANT CHANGE UP (ref 27–39.2)
AST SERPL-CCNC: 33 U/L — SIGNIFICANT CHANGE UP (ref 0–41)
BASOPHILS # BLD AUTO: 0.02 K/UL — SIGNIFICANT CHANGE UP (ref 0–0.2)
BASOPHILS NFR BLD AUTO: 0.2 % — SIGNIFICANT CHANGE UP (ref 0–1)
BILIRUB SERPL-MCNC: 0.3 MG/DL — SIGNIFICANT CHANGE UP (ref 0.2–1.2)
BUN SERPL-MCNC: 16 MG/DL — SIGNIFICANT CHANGE UP (ref 10–20)
CALCIUM SERPL-MCNC: 8.5 MG/DL — SIGNIFICANT CHANGE UP (ref 8.5–10.1)
CHLORIDE SERPL-SCNC: 97 MMOL/L — LOW (ref 98–110)
CO2 SERPL-SCNC: 29 MMOL/L — SIGNIFICANT CHANGE UP (ref 17–32)
CREAT SERPL-MCNC: 1 MG/DL — SIGNIFICANT CHANGE UP (ref 0.7–1.5)
EOSINOPHIL # BLD AUTO: 0.04 K/UL — SIGNIFICANT CHANGE UP (ref 0–0.7)
EOSINOPHIL NFR BLD AUTO: 0.5 % — SIGNIFICANT CHANGE UP (ref 0–8)
GLUCOSE SERPL-MCNC: 112 MG/DL — HIGH (ref 70–99)
HCT VFR BLD CALC: 39.8 % — LOW (ref 42–52)
HGB BLD-MCNC: 14 G/DL — SIGNIFICANT CHANGE UP (ref 14–18)
IMM GRANULOCYTES NFR BLD AUTO: 0.6 % — HIGH (ref 0.1–0.3)
INR BLD: 1.08 RATIO — SIGNIFICANT CHANGE UP (ref 0.65–1.3)
LACTATE SERPL-SCNC: 1.8 MMOL/L — SIGNIFICANT CHANGE UP (ref 0.5–2.2)
LYMPHOCYTES # BLD AUTO: 1.61 K/UL — SIGNIFICANT CHANGE UP (ref 1.2–3.4)
LYMPHOCYTES # BLD AUTO: 19.5 % — LOW (ref 20.5–51.1)
MCHC RBC-ENTMCNC: 30.4 PG — SIGNIFICANT CHANGE UP (ref 27–31)
MCHC RBC-ENTMCNC: 35.2 G/DL — SIGNIFICANT CHANGE UP (ref 32–37)
MCV RBC AUTO: 86.3 FL — SIGNIFICANT CHANGE UP (ref 80–94)
MONOCYTES # BLD AUTO: 0.99 K/UL — HIGH (ref 0.1–0.6)
MONOCYTES NFR BLD AUTO: 12 % — HIGH (ref 1.7–9.3)
NEUTROPHILS # BLD AUTO: 5.53 K/UL — SIGNIFICANT CHANGE UP (ref 1.4–6.5)
NEUTROPHILS NFR BLD AUTO: 67.2 % — SIGNIFICANT CHANGE UP (ref 42.2–75.2)
NRBC # BLD: 0 /100 WBCS — SIGNIFICANT CHANGE UP (ref 0–0)
PLATELET # BLD AUTO: 214 K/UL — SIGNIFICANT CHANGE UP (ref 130–400)
POTASSIUM SERPL-MCNC: 3.2 MMOL/L — LOW (ref 3.5–5)
POTASSIUM SERPL-SCNC: 3.2 MMOL/L — LOW (ref 3.5–5)
PROT SERPL-MCNC: 6.8 G/DL — SIGNIFICANT CHANGE UP (ref 6–8)
PROTHROM AB SERPL-ACNC: 12.4 SEC — SIGNIFICANT CHANGE UP (ref 9.95–12.87)
RBC # BLD: 4.61 M/UL — LOW (ref 4.7–6.1)
RBC # FLD: 13.5 % — SIGNIFICANT CHANGE UP (ref 11.5–14.5)
SODIUM SERPL-SCNC: 141 MMOL/L — SIGNIFICANT CHANGE UP (ref 135–146)
WBC # BLD: 8.24 K/UL — SIGNIFICANT CHANGE UP (ref 4.8–10.8)
WBC # FLD AUTO: 8.24 K/UL — SIGNIFICANT CHANGE UP (ref 4.8–10.8)

## 2019-01-01 PROCEDURE — 93926 LOWER EXTREMITY STUDY: CPT | Mod: 26

## 2019-01-01 PROCEDURE — 93971 EXTREMITY STUDY: CPT | Mod: 26

## 2019-01-01 RX ORDER — SODIUM CHLORIDE 9 MG/ML
1000 INJECTION INTRAMUSCULAR; INTRAVENOUS; SUBCUTANEOUS
Qty: 0 | Refills: 0 | Status: DISCONTINUED | OUTPATIENT
Start: 2019-01-01 | End: 2019-01-02

## 2019-01-01 RX ORDER — POTASSIUM CHLORIDE 20 MEQ
40 PACKET (EA) ORAL ONCE
Qty: 0 | Refills: 0 | Status: COMPLETED | OUTPATIENT
Start: 2019-01-01 | End: 2019-01-01

## 2019-01-01 RX ADMIN — SODIUM CHLORIDE 150 MILLILITER(S): 9 INJECTION INTRAMUSCULAR; INTRAVENOUS; SUBCUTANEOUS at 21:31

## 2019-01-01 RX ADMIN — Medication 100 MILLIGRAM(S): at 21:54

## 2019-01-01 RX ADMIN — Medication 40 MILLIEQUIVALENT(S): at 22:58

## 2019-01-01 NOTE — ED PROVIDER NOTE - OBJECTIVE STATEMENT
64 yo male hx of right Charcot's foot/ankle/ HTN/Psych disorder present c/o right lower leg swelling/redness and pain x 1 day. denies injury and trauma. reported elevated improved the swelling. denies similar episode in the pain. reported his podiatrist cut his right big toenail few weeks ago and it was bleeding at that time. he also had an right big toe ulcer there for a while. The ulcer improved after wound care at the NH. denies fever/chill/chest pain/sob/abd pain/n/v/d.

## 2019-01-01 NOTE — ED PROVIDER NOTE - ATTENDING CONTRIBUTION TO CARE
64 yo M with history of arterial insufficiency to bilateral LE, follows with vascular, unclear psychiatric history, here for assessment of rapid onset redness, warmth and swelling to RLE. No recent trauma, injury. No fever, chills. No isolated calf pain or tenderness.    VS normal, on exam patient has significant hyperpigmentation to bilateral LE, decreased DP and PT pulses bilaterally. RLE with warmth, redness and increased swelling distally.     Concern for cellulitis. Given known PVD will likely require inpatient treatment. Will check labs. Low suspicion for concomitant DVT however will get US to rule out.

## 2019-01-01 NOTE — H&P ADULT - HISTORY OF PRESENT ILLNESS
vitals in ED: 130.78, 77, 97.2F, 20, 97% on room air, LAbs no inc WBC count, Potassium 3.2, Xray of foot, vasc arterial and venous duplex was done in ED received clindamycin and potassium 40 once. 63 year old male with PMH of HTN, DLD, BPH, PVD follows up with Dr. huertas presented here for right lower extremity swelling, paintwice as normal and rash since 1 day. Denies any itching, tick byte, travel history, sick contacts, fever, chills, SOB, palpitations, dizziness, weight loss, loss of appetite, abd pain, diarrhea, joint pains, dysuria.       vitals in ED: 130.78, 77, 97.2F, 20, 97% on room air, LAbs no inc WBC count, Potassium 3.2, Xray of foot, vasc arterial and venous duplex was done in ED received clindamycin and potassium 40 once.

## 2019-01-01 NOTE — H&P ADULT - NSHPPHYSICALEXAM_GEN_ALL_CORE
ICU Vital Signs Last 24 Hrs  T(C): 36.2 (01 Jan 2019 20:33), Max: 36.2 (01 Jan 2019 20:33)  T(F): 97.2 (01 Jan 2019 20:33), Max: 97.2 (01 Jan 2019 20:33)  HR: 77 (01 Jan 2019 20:33) (77 - 77)  BP: 130/78 (01 Jan 2019 20:33) (130/78 - 130/78)  BP(mean): --  ABP: --  ABP(mean): --  RR: 20 (01 Jan 2019 20:33) (20 - 20)  SpO2: 97% (01 Jan 2019 20:33) (97% - 97%)    PHYSICAL EXAM:  GENERAL: NAD, speaks in full sentences, no signs of respiratory distress  HEAD:  Atraumatic, Normocephalic  EYES: EOMI, PERRLA, conjunctiva and sclera clear  NECK: Supple, No JVD  CHEST/LUNG: Clear to auscultation bilaterally; No wheeze; No crackles; No accessory muscles used  HEART: Regular rate and rhythm; No murmurs;   ABDOMEN: Soft, Nontender, Nondistended; Bowel sounds present; No guarding  EXTREMITIES:  2+ Peripheral Pulses, No cyanosis or edema  PSYCH: AAOx3  NEUROLOGY: non-focal  SKIN: No rashes or lesions ICU Vital Signs Last 24 Hrs  T(C): 36.2 (01 Jan 2019 20:33), Max: 36.2 (01 Jan 2019 20:33)  T(F): 97.2 (01 Jan 2019 20:33), Max: 97.2 (01 Jan 2019 20:33)  HR: 77 (01 Jan 2019 20:33) (77 - 77)  BP: 130/78 (01 Jan 2019 20:33) (130/78 - 130/78)  BP(mean): --  ABP: --  ABP(mean): --  RR: 20 (01 Jan 2019 20:33) (20 - 20)  SpO2: 97% (01 Jan 2019 20:33) (97% - 97%)    PHYSICAL EXAM:  GENERAL: NAD, speaks in full sentences, no signs of respiratory distress  CHEST/LUNG: Clear to auscultation bilaterally; No wheeze; No crackles; No accessory muscles used  HEART: Regular rate and rhythm; No murmurs;   ABDOMEN: Soft, Nontender, Nondistended; Bowel sounds present; No guarding  EXTREMITIES: Right lower extremity, no blanching, rash with swelling, warm to touch, faint pulses, Left lower extremity no rash, palpbale pulses  PSYCH: AAOx3  NEUROLOGY: non-focal  SKIN: No rashes or lesions

## 2019-01-01 NOTE — ED ADULT NURSE NOTE - OBJECTIVE STATEMENT
patient c/o of Left lower extremity pain, states he noticed redness  today in the shower. patient states the pain has radiates to popliteal area. patient has + edema, redness an warm to touch. patient denies any chest pain.

## 2019-01-01 NOTE — PATIENT PROFILE ADULT - NSPROGENARRIVEDFROM_GEN_A_NUR
home assisted living facility/pt states he is from home however pt was brought to unit with documentation stating he is from [facility]

## 2019-01-01 NOTE — ED ADULT NURSE NOTE - NSIMPLEMENTINTERV_GEN_ALL_ED
Implemented All Universal Safety Interventions:  [place] to call system. Call bell, personal items and telephone within reach. Instruct patient to call for assistance. Room bathroom lighting operational. Non-slip footwear when patient is off stretcher. Physically safe environment: no spills, clutter or unnecessary equipment. Stretcher in lowest position, wheels locked, appropriate side rails in place.

## 2019-01-01 NOTE — ED PROVIDER NOTE - PHYSICAL EXAMINATION
CONSTITUTIONAL: Well-appearing; well-nourished; in no apparent distress.   EYES: PERRL; EOM intact.   CARDIOVASCULAR: Normal S1, S2; no murmurs, rubs, or gallops.   RESPIRATORY: Normal chest excursion with respiration; breath sounds clear and equal bilaterally; no wheezes, rhonchi, or rales.  GI/: Normal bowel sounds; non-distended; non-tender; no palpable organomegaly.   MS: Right leg > left leg. + erythema/warmness/edema and mild ttp below right knee (extending from right knee to right foot). DP pulses detectable with doppler only. healing ulcer noted to right big toe without bleeding and discharge.   SKIN: see MS exam   NEURO/PSYCH: A & O x 4; grossly unremarkable. mood and manner are appropriate.

## 2019-01-01 NOTE — H&P ADULT - NSHPLABSRESULTS_GEN_ALL_CORE
14.0   8.24  )-----------( 214      ( 01 Jan 2019 21:30 )             39.8       01-01    141  |  97<L>  |  16  ----------------------------<  112<H>  3.2<L>   |  29  |  1.0    Ca    8.5      01 Jan 2019 21:30    TPro  6.8  /  Alb  3.7  /  TBili  0.3  /  DBili  x   /  AST  33  /  ALT  25  /  AlkPhos  54  01-01

## 2019-01-01 NOTE — ED PROVIDER NOTE - MEDICAL DECISION MAKING DETAILS
RLE cellulitis in setting of arterial insufficiency, labs unremarkable, to be admitted for IV abx as he is high risk for failure due to poor vasculature.

## 2019-01-01 NOTE — H&P ADULT - ASSESSMENT
#)Diet:  #)Activtiy:  #)DVT ppx:  #)GI ppx:  #)Dispo:  #)Code; 63 year old male with PMH of HTN, DLD, BPH, PVD follows up with Dr. huertas presented here for right lower extremity swelling, paintwice as normal and rash since 1 day.                #)Diet:  #)Activtiy:  #)DVT ppx:  #)GI ppx:  #)Dispo:  #)Code; 63 year old male with PMH of HTN, DLD, BPH, PVD follows up with Dr. huertas presented here for right lower extremity swelling, paintwice as normal and rash since 1 day.    #)Right lower extremity swelling/rash/non purulent cellulitis  -c/w clindamycin  -Follow up with blood cultures  -ID consult  -Follow up with venous and arterial duplex  -If there is any arteria disease call vascular    #)HTN not on any meds controlled    #)PVD  c/w aspirin    #)BPH   c/w flomax    #)Diet: DASH diet  #)Activtiy: OOBTC  #)DVT ppx: Lovenox  #)GI ppx: Not indicated  #)Dispo: From home  #)Code; Full 63 year old male with PMH of HTN, DLD, BPH, PVD follows up with Dr. huertas presented here for right lower extremity swelling, paintwice as normal and rash since 1 day.    #)Right lower extremity swelling/rash/non purulent cellulitis  -c/w clindamycin  -Follow up with blood cultures  -ID consult  -Follow up with venous and arterial duplex  -If there is any arteria disease call vascular    #)Hypokalemia  repleted  follow up repeat     #)HTN not on any meds controlled    #)PVD  c/w aspirin    #)BPH   c/w flomax    #)Diet: DASH diet  #)Activtiy: OOBTC  #)DVT ppx: Lovenox  #)GI ppx: Not indicated  #)Dispo: From home  #)Code; Full

## 2019-01-01 NOTE — ED PROVIDER NOTE - NS ED ROS FT
Constitutional: no fever, chills, no recent weight loss, change in appetite or malaise  Eyes: no redness/discharge/pain/vision changes  ENT: no rhinorrhea/ear pain/sore throat  Cardiac: No chest pain, SOB or edema.  Respiratory: No cough or respiratory distress  GI: No nausea, vomiting, diarrhea or abdominal pain.  : No dysuria, frequency, urgency or hematuria  MS: see HPI. no pain to back or extremities, no loss of ROM, no weakness  Neuro: No headache or weakness. No LOC.  Skin: see HPI. No skin rash.  Endocrine: No history of thyroid disease or diabetes.  Except as documented in the HPI, all other systems are negative.

## 2019-01-02 LAB
ANION GAP SERPL CALC-SCNC: 14 MMOL/L — SIGNIFICANT CHANGE UP (ref 7–14)
BASOPHILS # BLD AUTO: 0.02 K/UL — SIGNIFICANT CHANGE UP (ref 0–0.2)
BASOPHILS NFR BLD AUTO: 0.3 % — SIGNIFICANT CHANGE UP (ref 0–1)
BUN SERPL-MCNC: 12 MG/DL — SIGNIFICANT CHANGE UP (ref 10–20)
CALCIUM SERPL-MCNC: 7.9 MG/DL — LOW (ref 8.5–10.1)
CHLORIDE SERPL-SCNC: 100 MMOL/L — SIGNIFICANT CHANGE UP (ref 98–110)
CO2 SERPL-SCNC: 27 MMOL/L — SIGNIFICANT CHANGE UP (ref 17–32)
CREAT SERPL-MCNC: 0.7 MG/DL — SIGNIFICANT CHANGE UP (ref 0.7–1.5)
EOSINOPHIL # BLD AUTO: 0.06 K/UL — SIGNIFICANT CHANGE UP (ref 0–0.7)
EOSINOPHIL NFR BLD AUTO: 0.8 % — SIGNIFICANT CHANGE UP (ref 0–8)
GLUCOSE SERPL-MCNC: 94 MG/DL — SIGNIFICANT CHANGE UP (ref 70–99)
HCT VFR BLD CALC: 33.2 % — LOW (ref 42–52)
HCT VFR BLD CALC: 34.3 % — LOW (ref 42–52)
HGB BLD-MCNC: 11.7 G/DL — LOW (ref 14–18)
HGB BLD-MCNC: 11.8 G/DL — LOW (ref 14–18)
IMM GRANULOCYTES NFR BLD AUTO: 0.4 % — HIGH (ref 0.1–0.3)
LYMPHOCYTES # BLD AUTO: 1.56 K/UL — SIGNIFICANT CHANGE UP (ref 1.2–3.4)
LYMPHOCYTES # BLD AUTO: 19.5 % — LOW (ref 20.5–51.1)
MAGNESIUM SERPL-MCNC: 2.2 MG/DL — SIGNIFICANT CHANGE UP (ref 1.8–2.4)
MCHC RBC-ENTMCNC: 29.7 PG — SIGNIFICANT CHANGE UP (ref 27–31)
MCHC RBC-ENTMCNC: 30.4 PG — SIGNIFICANT CHANGE UP (ref 27–31)
MCHC RBC-ENTMCNC: 34.4 G/DL — SIGNIFICANT CHANGE UP (ref 32–37)
MCHC RBC-ENTMCNC: 35.2 G/DL — SIGNIFICANT CHANGE UP (ref 32–37)
MCV RBC AUTO: 86.2 FL — SIGNIFICANT CHANGE UP (ref 80–94)
MCV RBC AUTO: 86.4 FL — SIGNIFICANT CHANGE UP (ref 80–94)
MONOCYTES # BLD AUTO: 1.14 K/UL — HIGH (ref 0.1–0.6)
MONOCYTES NFR BLD AUTO: 14.3 % — HIGH (ref 1.7–9.3)
NEUTROPHILS # BLD AUTO: 5.19 K/UL — SIGNIFICANT CHANGE UP (ref 1.4–6.5)
NEUTROPHILS NFR BLD AUTO: 64.7 % — SIGNIFICANT CHANGE UP (ref 42.2–75.2)
NRBC # BLD: 0 /100 WBCS — SIGNIFICANT CHANGE UP (ref 0–0)
PLATELET # BLD AUTO: 204 K/UL — SIGNIFICANT CHANGE UP (ref 130–400)
PLATELET # BLD AUTO: 204 K/UL — SIGNIFICANT CHANGE UP (ref 130–400)
POTASSIUM SERPL-MCNC: 3.3 MMOL/L — LOW (ref 3.5–5)
POTASSIUM SERPL-SCNC: 3.3 MMOL/L — LOW (ref 3.5–5)
RBC # BLD: 3.85 M/UL — LOW (ref 4.7–6.1)
RBC # BLD: 3.97 M/UL — LOW (ref 4.7–6.1)
RBC # FLD: 13.5 % — SIGNIFICANT CHANGE UP (ref 11.5–14.5)
RBC # FLD: 13.5 % — SIGNIFICANT CHANGE UP (ref 11.5–14.5)
SODIUM SERPL-SCNC: 141 MMOL/L — SIGNIFICANT CHANGE UP (ref 135–146)
WBC # BLD: 7.41 K/UL — SIGNIFICANT CHANGE UP (ref 4.8–10.8)
WBC # BLD: 8 K/UL — SIGNIFICANT CHANGE UP (ref 4.8–10.8)
WBC # FLD AUTO: 7.41 K/UL — SIGNIFICANT CHANGE UP (ref 4.8–10.8)
WBC # FLD AUTO: 8 K/UL — SIGNIFICANT CHANGE UP (ref 4.8–10.8)

## 2019-01-02 RX ORDER — ASPIRIN/CALCIUM CARB/MAGNESIUM 324 MG
81 TABLET ORAL DAILY
Qty: 0 | Refills: 0 | Status: DISCONTINUED | OUTPATIENT
Start: 2019-01-02 | End: 2019-01-04

## 2019-01-02 RX ORDER — POTASSIUM CHLORIDE 20 MEQ
20 PACKET (EA) ORAL ONCE
Qty: 0 | Refills: 0 | Status: COMPLETED | OUTPATIENT
Start: 2019-01-02 | End: 2019-01-02

## 2019-01-02 RX ORDER — TAMSULOSIN HYDROCHLORIDE 0.4 MG/1
0.4 CAPSULE ORAL AT BEDTIME
Qty: 0 | Refills: 0 | Status: DISCONTINUED | OUTPATIENT
Start: 2019-01-02 | End: 2019-01-04

## 2019-01-02 RX ORDER — CHLORHEXIDINE GLUCONATE 213 G/1000ML
1 SOLUTION TOPICAL
Qty: 0 | Refills: 0 | Status: DISCONTINUED | OUTPATIENT
Start: 2019-01-02 | End: 2019-01-04

## 2019-01-02 RX ORDER — ENOXAPARIN SODIUM 100 MG/ML
40 INJECTION SUBCUTANEOUS DAILY
Qty: 0 | Refills: 0 | Status: DISCONTINUED | OUTPATIENT
Start: 2019-01-02 | End: 2019-01-04

## 2019-01-02 RX ORDER — POTASSIUM CHLORIDE 20 MEQ
40 PACKET (EA) ORAL ONCE
Qty: 0 | Refills: 0 | Status: COMPLETED | OUTPATIENT
Start: 2019-01-02 | End: 2019-01-02

## 2019-01-02 RX ADMIN — Medication 40 MILLIEQUIVALENT(S): at 12:34

## 2019-01-02 RX ADMIN — SODIUM CHLORIDE 150 MILLILITER(S): 9 INJECTION INTRAMUSCULAR; INTRAVENOUS; SUBCUTANEOUS at 05:02

## 2019-01-02 RX ADMIN — TAMSULOSIN HYDROCHLORIDE 0.4 MILLIGRAM(S): 0.4 CAPSULE ORAL at 21:42

## 2019-01-02 RX ADMIN — Medication 100 MILLIGRAM(S): at 05:03

## 2019-01-02 RX ADMIN — Medication 81 MILLIGRAM(S): at 12:35

## 2019-01-02 RX ADMIN — Medication 100 MILLIGRAM(S): at 12:32

## 2019-01-02 RX ADMIN — Medication 100 MILLIGRAM(S): at 17:49

## 2019-01-02 RX ADMIN — Medication 20 MILLIEQUIVALENT(S): at 14:20

## 2019-01-02 NOTE — CONSULT NOTE ADULT - EXTREMITIES COMMENTS
RLE with edema/ chronic hyperpigmentation along medial aspect with erythema along superior aspect along lateral aspect

## 2019-01-02 NOTE — PROGRESS NOTE ADULT - SUBJECTIVE AND OBJECTIVE BOX
RO LEMUS 63y Male  MRN#: [id number]     SUBJECTIVE  Patient is a 63y old Male who presents with a chief complaint of right lower extremity swelling and rash (02 Jan 2019 07:54)  Currently admitted to medicine with the primary diagnosis of Cellulitis of right leg    OBJECTIVE  PAST MEDICAL & SURGICAL HISTORY  Charcot ankle, right  Hypercholesteremia  Enlarged prostate: BPH  HTN (hypertension)  No significant past surgical history    ALLERGIES:  Augmentin (Rash)  Vitamin D (Hives)    MEDICATIONS:  STANDING MEDICATIONS  aspirin enteric coated 81 milliGRAM(s) Oral daily  chlorhexidine 4% Liquid 1 Application(s) Topical <User Schedule>  clindamycin IVPB 600 milliGRAM(s) IV Intermittent every 8 hours  enoxaparin Injectable 40 milliGRAM(s) SubCutaneous daily  sodium chloride 0.9%. 1000 milliLiter(s) IV Continuous <Continuous>  tamsulosin 0.4 milliGRAM(s) Oral at bedtime    PRN MEDICATIONS      VITAL SIGNS: Last 24 Hours  T(C): 35.9 (02 Jan 2019 04:44), Max: 36.7 (01 Jan 2019 23:55)  T(F): 96.7 (02 Jan 2019 04:44), Max: 98.1 (01 Jan 2019 23:55)  HR: 78 (02 Jan 2019 04:44) (77 - 86)  BP: 130/67 (02 Jan 2019 04:44) (118/66 - 130/78)  BP(mean): --  RR: 20 (02 Jan 2019 04:44) (20 - 20)  SpO2: 96% (01 Jan 2019 23:55) (96% - 97%)    LABS:                        11.7   8.00  )-----------( 204      ( 02 Jan 2019 05:24 )             33.2     01-02    141  |  100  |  12  ----------------------------<  94  3.3<L>   |  27  |  0.7    Ca    7.9<L>      02 Jan 2019 05:24  Mg     2.2     01-02    TPro  6.8  /  Alb  3.7  /  TBili  0.3  /  DBili  x   /  AST  33  /  ALT  25  /  AlkPhos  54  01-01    PT/INR - ( 01 Jan 2019 21:30 )   PT: 12.40 sec;   INR: 1.08 ratio         PTT - ( 01 Jan 2019 21:30 )  PTT:32.0 sec      Lactate, Blood: 1.8 mmol/L (01-01-19 @ 21:30)      PHYSICAL EXAM:      ASSESSMENT & PLAN RO LEMUS 63y Male  MRN#: [id number]     SUBJECTIVE  Patient is a 63y old Male who presents with a chief complaint of right lower extremity swelling and rash (02 Jan 2019 07:54)  Currently admitted to medicine with the primary diagnosis of Cellulitis of right leg    OBJECTIVE  PAST MEDICAL & SURGICAL HISTORY  Charcot ankle, right  Hypercholesteremia  Enlarged prostate: BPH  HTN (hypertension)  No significant past surgical history    ALLERGIES:  Augmentin (Rash)  Vitamin D (Hives)    MEDICATIONS:  STANDING MEDICATIONS  aspirin enteric coated 81 milliGRAM(s) Oral daily  chlorhexidine 4% Liquid 1 Application(s) Topical <User Schedule>  clindamycin IVPB 600 milliGRAM(s) IV Intermittent every 8 hours  enoxaparin Injectable 40 milliGRAM(s) SubCutaneous daily  sodium chloride 0.9%. 1000 milliLiter(s) IV Continuous <Continuous>  tamsulosin 0.4 milliGRAM(s) Oral at bedtime    PRN MEDICATIONS      VITAL SIGNS: Last 24 Hours  T(C): 35.9 (02 Jan 2019 04:44), Max: 36.7 (01 Jan 2019 23:55)  T(F): 96.7 (02 Jan 2019 04:44), Max: 98.1 (01 Jan 2019 23:55)  HR: 78 (02 Jan 2019 04:44) (77 - 86)  BP: 130/67 (02 Jan 2019 04:44) (118/66 - 130/78)  BP(mean): --  RR: 20 (02 Jan 2019 04:44) (20 - 20)  SpO2: 96% (01 Jan 2019 23:55) (96% - 97%)    LABS:                        11.7   8.00  )-----------( 204      ( 02 Jan 2019 05:24 )             33.2     01-02    141  |  100  |  12  ----------------------------<  94  3.3<L>   |  27  |  0.7    Ca    7.9<L>      02 Jan 2019 05:24  Mg     2.2     01-02    TPro  6.8  /  Alb  3.7  /  TBili  0.3  /  DBili  x   /  AST  33  /  ALT  25  /  AlkPhos  54  01-01    PT/INR - ( 01 Jan 2019 21:30 )   PT: 12.40 sec;   INR: 1.08 ratio         PTT - ( 01 Jan 2019 21:30 )  PTT:32.0 sec      Lactate, Blood: 1.8 mmol/L (01-01-19 @ 21:30)      PHYSICAL EXAM:  	GENERAL: NAD, speaks in full sentences, no signs of respiratory distress  	CHEST/LUNG: Clear to auscultation bilaterally; No wheeze; No crackles; No accessory muscles used  	HEART: Regular rate and rhythm; No murmurs;   	ABDOMEN: Soft, Nontender, Nondistended; Bowel sounds present; No guarding  	EXTREMITIES: Right lower extremity swelling and redness, no blanching, warm to touch, faint pulses,palpabale pulses  	PSYCH: AAOx3  	NEUROLOGY: non-focal    ASSESSMENT & PLAN  63 year old male with PMH of HTN, DLD, BPH, PVD follows up with Dr. huertas presented here for right lower extremity swelling and rash since 1 day.    #)Right lower extremity swelling/rash/non purulent cellulitis  -Clindamycin changed to oral doxycycline 100mg q12 for 8 more days.  -Follow up with blood cultures  -ID follow up.  -Follow up with venous and arterial duplex  -If there is any arteria disease will call vascular  -Anticipated for tomorrow.    #)Hypokalemia  -will replete it today.  -Follow up on the repeat      #)HTN not on any meds controlled    #)PVD  c/w aspirin    #)BPH   c/w flomax    #)Diet: DASH diet  #)Activtiy: OOBTC  #)DVT ppx: Lovenox  #)GI ppx: Not indicated  #)Dispo: From home  #)Code; Full RO LEMUS 63y Male  MRN#: [id number]     SUBJECTIVE  Patient is a 63y old Male who presents with a chief complaint of right lower extremity swelling and rash (02 Jan 2019 07:54)  Currently admitted to medicine with the primary diagnosis of Cellulitis of right leg    OBJECTIVE  PAST MEDICAL & SURGICAL HISTORY  Charcot ankle, right  Hypercholesteremia  Enlarged prostate: BPH  HTN (hypertension)  No significant past surgical history    ALLERGIES:  Augmentin (Rash)  Vitamin D (Hives)    MEDICATIONS:  STANDING MEDICATIONS  aspirin enteric coated 81 milliGRAM(s) Oral daily  chlorhexidine 4% Liquid 1 Application(s) Topical <User Schedule>  clindamycin IVPB 600 milliGRAM(s) IV Intermittent every 8 hours  enoxaparin Injectable 40 milliGRAM(s) SubCutaneous daily  sodium chloride 0.9%. 1000 milliLiter(s) IV Continuous <Continuous>  tamsulosin 0.4 milliGRAM(s) Oral at bedtime    PRN MEDICATIONS      VITAL SIGNS: Last 24 Hours  T(C): 35.9 (02 Jan 2019 04:44), Max: 36.7 (01 Jan 2019 23:55)  T(F): 96.7 (02 Jan 2019 04:44), Max: 98.1 (01 Jan 2019 23:55)  HR: 78 (02 Jan 2019 04:44) (77 - 86)  BP: 130/67 (02 Jan 2019 04:44) (118/66 - 130/78)  BP(mean): --  RR: 20 (02 Jan 2019 04:44) (20 - 20)  SpO2: 96% (01 Jan 2019 23:55) (96% - 97%)    LABS:                        11.7   8.00  )-----------( 204      ( 02 Jan 2019 05:24 )             33.2     01-02    141  |  100  |  12  ----------------------------<  94  3.3<L>   |  27  |  0.7    Ca    7.9<L>      02 Jan 2019 05:24  Mg     2.2     01-02    TPro  6.8  /  Alb  3.7  /  TBili  0.3  /  DBili  x   /  AST  33  /  ALT  25  /  AlkPhos  54  01-01    PT/INR - ( 01 Jan 2019 21:30 )   PT: 12.40 sec;   INR: 1.08 ratio         PTT - ( 01 Jan 2019 21:30 )  PTT:32.0 sec      Lactate, Blood: 1.8 mmol/L (01-01-19 @ 21:30)      PHYSICAL EXAM:  	GENERAL: NAD, speaks in full sentences, no signs of respiratory distress  	CHEST/LUNG: Clear to auscultation bilaterally; No wheeze; No crackles; No accessory muscles used  	HEART: Regular rate and rhythm; No murmurs;   	ABDOMEN: Soft, Nontender, Nondistended; Bowel sounds present; No guarding  	EXTREMITIES: Right lower extremity swelling and redness, no blanching, warm to touch, faint pulses,palpabale pulses  	PSYCH: AAOx3  	NEUROLOGY: non-focal    ASSESSMENT & PLAN  63 year old male with PMH of HTN, DLD, BPH, PVD follows up with Dr. huertas presented here for right lower extremity swelling and rash since 1 day.    #)Right lower extremity swelling/rash/non purulent cellulitis  -Clindamycin changed to oral doxycycline 100mg q12 for 8 more days.  -Follow up with blood cultures  -ID follow up.  -Follow up with venous and arterial duplex  -If there is any arteria disease will call vascular  -Anticipated for tomorrow.    #)Hypokalemia  -will replete it today.  -Follow up on the repeat      #)HTN not on any meds controlled    #)PVD  c/w aspirin    #)BPH   c/w flomax    #)Diet: DASH diet  #)Activtiy: OOBTC  #)DVT ppx: Lovenox  #)GI ppx: Not indicated  #)Dispo: From home  #)Code; Full. RO LEMUS 63y Male  MRN#: [id number]     SUBJECTIVE  Patient is a 63y old Male who presents with a chief complaint of right lower extremity swelling and rash (02 Jan 2019 07:54)  Currently admitted to medicine with the primary diagnosis of Cellulitis of right leg    OBJECTIVE  PAST MEDICAL & SURGICAL HISTORY  Charcot ankle, right  Hypercholesteremia  Enlarged prostate: BPH  HTN (hypertension)  No significant past surgical history    ALLERGIES:  Augmentin (Rash)  Vitamin D (Hives)    MEDICATIONS:  STANDING MEDICATIONS  aspirin enteric coated 81 milliGRAM(s) Oral daily  chlorhexidine 4% Liquid 1 Application(s) Topical <User Schedule>  clindamycin IVPB 600 milliGRAM(s) IV Intermittent every 8 hours  enoxaparin Injectable 40 milliGRAM(s) SubCutaneous daily  sodium chloride 0.9%. 1000 milliLiter(s) IV Continuous <Continuous>  tamsulosin 0.4 milliGRAM(s) Oral at bedtime    PRN MEDICATIONS      VITAL SIGNS: Last 24 Hours  T(C): 35.9 (02 Jan 2019 04:44), Max: 36.7 (01 Jan 2019 23:55)  T(F): 96.7 (02 Jan 2019 04:44), Max: 98.1 (01 Jan 2019 23:55)  HR: 78 (02 Jan 2019 04:44) (77 - 86)  BP: 130/67 (02 Jan 2019 04:44) (118/66 - 130/78)  BP(mean): --  RR: 20 (02 Jan 2019 04:44) (20 - 20)  SpO2: 96% (01 Jan 2019 23:55) (96% - 97%)    LABS:                        11.7   8.00  )-----------( 204      ( 02 Jan 2019 05:24 )             33.2     01-02    141  |  100  |  12  ----------------------------<  94  3.3<L>   |  27  |  0.7    Ca    7.9<L>      02 Jan 2019 05:24  Mg     2.2     01-02    TPro  6.8  /  Alb  3.7  /  TBili  0.3  /  DBili  x   /  AST  33  /  ALT  25  /  AlkPhos  54  01-01    PT/INR - ( 01 Jan 2019 21:30 )   PT: 12.40 sec;   INR: 1.08 ratio         PTT - ( 01 Jan 2019 21:30 )  PTT:32.0 sec      Lactate, Blood: 1.8 mmol/L (01-01-19 @ 21:30)      PHYSICAL EXAM:  	GENERAL: NAD, speaks in full sentences, no signs of respiratory distress  	CHEST/LUNG: Clear to auscultation bilaterally; No wheeze; No crackles; No accessory muscles used  	HEART: Regular rate and rhythm; No murmurs;   	ABDOMEN: Soft, Nontender, Nondistended; Bowel sounds present; No guarding  	EXTREMITIES: Right lower extremity swelling and redness, no blanching, warm to touch, faint pulses,palpabale pulses  	PSYCH: AAOx3  	NEUROLOGY: non-focal    ASSESSMENT & PLAN  63 year old male with PMH of HTN, DLD, BPH, PVD follows up with Dr. huertas presented here for right lower extremity swelling and rash since 1 day.    #)Right lower extremity swelling/rash/non purulent cellulitis  -Clindamycin changed to oral doxycycline 100mg q12 for 8 more days.  -Follow up with blood cultures  -ID follow up.  -Follow up with venous and arterial duplex  -If there is any arteria disease will call vascular  -Anticipated for tomorrow.  -Podiatry consult placed for the right toe lesion  -Xray of the right ankle placed to look for osteomyelitis.    #)Hypokalemia  -will replete it today.  -Follow up on the repeat      #)HTN not on any meds controlled    #)PVD  c/w aspirin    #)BPH   c/w flomax    #)Diet: DASH diet  #)Activtiy: OOBTC  #)DVT ppx: Lovenox  #)GI ppx: Not indicated  #)Dispo: From home  #)Code; Full.

## 2019-01-02 NOTE — CONSULT NOTE ADULT - SUBJECTIVE AND OBJECTIVE BOX
RO LEMUS  63y, Male  Allergy: Augmentin (Rash)  Vitamin D (Hives)      HPI:  63 year old male with PMH of HTN, DLD, BPH, PVD follows up with Dr. huertas presented here for right lower extremity swelling, paintwice as normal and rash since 1 day. Denies any itching, tick byte, travel history, sick contacts, fever, chills, SOB, palpitations, dizziness, weight loss, loss of appetite, abd pain, diarrhea, joint pains, dysuria.       vitals in ED: 130.78, 77, 97.2F, 20, 97% on room air, LAbs no inc WBC count, Potassium 3.2, Xray of foot, vasc arterial and venous duplex was done in ED received clindamycin and potassium 40 once. (01 Jan 2019 23:40)    FAMILY HISTORY:  No pertinent family history in first degree relatives    PAST MEDICAL & SURGICAL HISTORY:  Charcot ankle, right  Hypercholesteremia  Enlarged prostate: BPH  HTN (hypertension)  No significant past surgical history        VITALS:  T(F): 96.7, Max: 98.1 (01-01-19 @ 23:55)  HR: 78  BP: 130/67  RR: 20Vital Signs Last 24 Hrs  T(C): 35.9 (02 Jan 2019 04:44), Max: 36.7 (01 Jan 2019 23:55)  T(F): 96.7 (02 Jan 2019 04:44), Max: 98.1 (01 Jan 2019 23:55)  HR: 78 (02 Jan 2019 04:44) (77 - 86)  BP: 130/67 (02 Jan 2019 04:44) (118/66 - 130/78)  BP(mean): --  RR: 20 (02 Jan 2019 04:44) (20 - 20)  SpO2: 96% (01 Jan 2019 23:55) (96% - 97%)    TESTS & MEASUREMENTS:                        11.7   8.00  )-----------( 204      ( 02 Jan 2019 05:24 )             33.2     01-02    141  |  100  |  12  ----------------------------<  94  3.3<L>   |  27  |  0.7    Ca    7.9<L>      02 Jan 2019 05:24  Mg     2.2     01-02    TPro  6.8  /  Alb  3.7  /  TBili  0.3  /  DBili  x   /  AST  33  /  ALT  25  /  AlkPhos  54  01-01    LIVER FUNCTIONS - ( 01 Jan 2019 21:30 )  Alb: 3.7 g/dL / Pro: 6.8 g/dL / ALK PHOS: 54 U/L / ALT: 25 U/L / AST: 33 U/L / GGT: x                   RADIOLOGY & ADDITIONAL TESTS:    ANTIBIOTICS:  clindamycin IVPB 600 milliGRAM(s) IV Intermittent every 8 hours

## 2019-01-02 NOTE — CONSULT NOTE ADULT - SUBJECTIVE AND OBJECTIVE BOX
Patient is a 63y old  Male who presents with a chief complaint of right lower extremity swelling and rash (02 Jan 2019 09:00)      Patient seen at bedside. Patient relates that he follows up in the clinic with Dr. Hendreson and Dr. Natarajan. Patient relates that he has an appointment next week. Patient relates that he noticed that he was unable to get his right shoe on and realized how swollen his leg was and came to the ED. Patient relates that his leg looks a little better and the swelling has decreased since yesterday. Patient relates his wound is being dressed at the facility he lives at with [facility]. Patient has no other complaints at this time.     HPI:  63 year old male with PMH of HTN, DLD, BPH, PVD follows up with Dr. huertas presented here for right lower extremity swelling, paintwice as normal and rash since 1 day. Denies any itching, tick byte, travel history, sick contacts, fever, chills, SOB, palpitations, dizziness, weight loss, loss of appetite, abd pain, diarrhea, joint pains, dysuria.       vitals in ED: 130.78, 77, 97.2F, 20, 97% on room air, LAbs no inc WBC count, Potassium 3.2, Xray of foot, vasc arterial and venous duplex was done in ED received clindamycin and potassium 40 once. (01 Jan 2019 23:40)          PAST MEDICAL & SURGICAL HISTORY:  Charcot ankle, right  Hypercholesteremia  Enlarged prostate: BPH  HTN (hypertension)  No significant past surgical history      Allergies    Augmentin (Rash)  Vitamin D (Hives)    Intolerances        MEDICATIONS  (STANDING):  aspirin enteric coated 81 milliGRAM(s) Oral daily  chlorhexidine 4% Liquid 1 Application(s) Topical <User Schedule>  doxycycline hyclate Capsule 100 milliGRAM(s) Oral every 12 hours  enoxaparin Injectable 40 milliGRAM(s) SubCutaneous daily  sodium chloride 0.9%. 1000 milliLiter(s) (150 mL/Hr) IV Continuous <Continuous>  tamsulosin 0.4 milliGRAM(s) Oral at bedtime    MEDICATIONS  (PRN):      FAMILY HISTORY:  No pertinent family history in first degree relatives      SOCIAL HISTORY:     REVIEW OF SYSTEMS:    Constitutional: No weakness, fever, or chills  Eyes/ENT: No visual changes; No vertigo or throat pain  Neck: No pain or stiffness  Respiratory: No cough, wheezing, or shortness of breath  Cardiovascular: no chest pain or palpitations  Gastrointestinal: No abdominal or epigastric pain. No nausea or vomiting  Genitourinary: No dysuria, urgency or incontinence  Neurological: No numbness or weakness  Skin: No itching, burning, rashes or lesions  Vascular: + edema RLE  All other review of systems is negative unless indicated above    PHYSICAL EXAM:    Vital Signs Last 24 Hrs  T(C): 37.4 (02 Jan 2019 12:32), Max: 37.4 (02 Jan 2019 12:32)  T(F): 99.3 (02 Jan 2019 12:32), Max: 99.3 (02 Jan 2019 12:32)  HR: 69 (02 Jan 2019 12:32) (69 - 86)  BP: 120/62 (02 Jan 2019 12:32) (118/66 - 130/78)  BP(mean): --  RR: 18 (02 Jan 2019 12:32) (18 - 20)  SpO2: 96% (01 Jan 2019 23:55) (96% - 97%)    Patient seen at bedside. Patient NAD and AAOx3.  Vascular: DP/PT pulses palpable bilaterally. CFT <3 seconds to all digits bilaterally. Skin temperature warm to warm from proximal to distal bilaterally.   Neurologic: Light touch sensation diminished bilaterally.  Musculoskeletal: No pain to palpation noted bilaterally.  Dermatological: RLE discoloration dark with ascending redness sparing the foot. Right distal hallux wound with superficial granular base.       LABS:                          11.8   7.41  )-----------( 204      ( 02 Jan 2019 12:03 )             34.3                   01-02    141  |  100  |  12  ----------------------------<  94  3.3<L>   |  27  |  0.7    Ca    7.9<L>      02 Jan 2019 05:24  Mg     2.2     01-02    TPro  6.8  /  Alb  3.7  /  TBili  0.3  /  DBili  x   /  AST  33  /  ALT  25  /  AlkPhos  54  01-01          PT/INR - ( 01 Jan 2019 21:30 )   PT: 12.40 sec;   INR: 1.08 ratio         PTT - ( 01 Jan 2019 21:30 )  PTT:32.0 sec        A:  RLE cellulitis  Right hallux superficial wound    P:  Patient examined and evaluated  Patient dressed with hydrogel/DSD/Kerlix  Continue IV abx per ID recommendations  Will f/u with attending on any further recommendations.

## 2019-01-02 NOTE — CONSULT NOTE ADULT - ASSESSMENT
IMPRESSION:  Minimal cellulitis RLE distal mid section above chronic changes  No sepsis  WBC 8.0    RECOMMENDATIONS:  D/c clindamycin  Po Doxycycline 100 mg q12h for 8 more days  Recall prn please

## 2019-01-03 LAB
ANION GAP SERPL CALC-SCNC: 12 MMOL/L — SIGNIFICANT CHANGE UP (ref 7–14)
BUN SERPL-MCNC: 11 MG/DL — SIGNIFICANT CHANGE UP (ref 10–20)
CALCIUM SERPL-MCNC: 8.1 MG/DL — LOW (ref 8.5–10.1)
CHLORIDE SERPL-SCNC: 105 MMOL/L — SIGNIFICANT CHANGE UP (ref 98–110)
CO2 SERPL-SCNC: 26 MMOL/L — SIGNIFICANT CHANGE UP (ref 17–32)
CREAT SERPL-MCNC: 0.6 MG/DL — LOW (ref 0.7–1.5)
ERYTHROCYTE [SEDIMENTATION RATE] IN BLOOD: 68 MM/HR — HIGH (ref 0–10)
GLUCOSE SERPL-MCNC: 90 MG/DL — SIGNIFICANT CHANGE UP (ref 70–99)
HCT VFR BLD CALC: 34.5 % — LOW (ref 42–52)
HGB BLD-MCNC: 11.8 G/DL — LOW (ref 14–18)
MCHC RBC-ENTMCNC: 30.2 PG — SIGNIFICANT CHANGE UP (ref 27–31)
MCHC RBC-ENTMCNC: 34.2 G/DL — SIGNIFICANT CHANGE UP (ref 32–37)
MCV RBC AUTO: 88.2 FL — SIGNIFICANT CHANGE UP (ref 80–94)
NRBC # BLD: 0 /100 WBCS — SIGNIFICANT CHANGE UP (ref 0–0)
PLATELET # BLD AUTO: 213 K/UL — SIGNIFICANT CHANGE UP (ref 130–400)
POTASSIUM SERPL-MCNC: 3.7 MMOL/L — SIGNIFICANT CHANGE UP (ref 3.5–5)
POTASSIUM SERPL-SCNC: 3.7 MMOL/L — SIGNIFICANT CHANGE UP (ref 3.5–5)
RBC # BLD: 3.91 M/UL — LOW (ref 4.7–6.1)
RBC # FLD: 13.9 % — SIGNIFICANT CHANGE UP (ref 11.5–14.5)
SODIUM SERPL-SCNC: 143 MMOL/L — SIGNIFICANT CHANGE UP (ref 135–146)
WBC # BLD: 8.01 K/UL — SIGNIFICANT CHANGE UP (ref 4.8–10.8)
WBC # FLD AUTO: 8.01 K/UL — SIGNIFICANT CHANGE UP (ref 4.8–10.8)

## 2019-01-03 PROCEDURE — 99231 SBSQ HOSP IP/OBS SF/LOW 25: CPT

## 2019-01-03 RX ADMIN — CHLORHEXIDINE GLUCONATE 1 APPLICATION(S): 213 SOLUTION TOPICAL at 05:06

## 2019-01-03 RX ADMIN — Medication 81 MILLIGRAM(S): at 11:06

## 2019-01-03 RX ADMIN — TAMSULOSIN HYDROCHLORIDE 0.4 MILLIGRAM(S): 0.4 CAPSULE ORAL at 22:06

## 2019-01-03 RX ADMIN — ENOXAPARIN SODIUM 40 MILLIGRAM(S): 100 INJECTION SUBCUTANEOUS at 11:06

## 2019-01-03 RX ADMIN — Medication 100 MILLIGRAM(S): at 05:07

## 2019-01-03 RX ADMIN — Medication 100 MILLIGRAM(S): at 17:39

## 2019-01-03 NOTE — PROGRESS NOTE ADULT - SUBJECTIVE AND OBJECTIVE BOX
RO LEMUS  63y  Male      Patient is a 63y old  Male who presents with a chief complaint of right lower extremity swelling and rash (03 Jan 2019 14:53)      INTERVAL HPI/OVERNIGHT EVENTS: reports great improvement in LE erythema/burning/warmth. able to walk/bear weight on the leg      REVIEW OF SYSTEMS:  as above  All other review of systems negative    T(C): 35.9 (01-03-19 @ 12:36), Max: 37 (01-02-19 @ 21:00)  HR: 84 (01-03-19 @ 12:36) (75 - 84)  BP: 127/92 (01-03-19 @ 12:36) (103/57 - 130/56)  RR: 18 (01-03-19 @ 12:36) (18 - 18)  SpO2: 95% (01-03-19 @ 08:00) (95% - 97%)  Wt(kg): --Vital Signs Last 24 Hrs  T(C): 35.9 (03 Jan 2019 12:36), Max: 37 (02 Jan 2019 21:00)  T(F): 96.6 (03 Jan 2019 12:36), Max: 98.6 (02 Jan 2019 21:00)  HR: 84 (03 Jan 2019 12:36) (75 - 84)  BP: 127/92 (03 Jan 2019 12:36) (103/57 - 130/56)  BP(mean): --  RR: 18 (03 Jan 2019 12:36) (18 - 18)  SpO2: 95% (03 Jan 2019 08:00) (95% - 97%)        PHYSICAL EXAM:  GENERAL: NAD  PSYCH: no agitation, baseline mentation  NERVOUS SYSTEM:  Alert & Oriented X3, no new focal deficits  PULMONARY: Clear to percussion bilaterally; No rales, rhonchi, wheezing, or rubs  CARDIOVASCULAR: Regular rate and rhythm; No murmurs, rubs, or gallops  GI: Soft, Nontender, Nondistended; Bowel sounds present  EXTREMITIES:  RLE erythema improved, b/l PVD hyperpigmentation changes, chronic L hallux cdi dressed/ unchanged    Consultant(s) Notes Reviewed:  [x ] YES  [ ] NO    Discussed with Consultants/Other Providers [ x] YES     LABS                          11.8   8.01  )-----------( 213      ( 03 Jan 2019 07:47 )             34.5     01-03    143  |  105  |  11  ----------------------------<  90  3.7   |  26  |  0.6<L>    Ca    8.1<L>      03 Jan 2019 07:47  Mg     2.2     01-02    TPro  6.8  /  Alb  3.7  /  TBili  0.3  /  DBili  x   /  AST  33  /  ALT  25  /  AlkPhos  54  01-01        PT/INR - ( 01 Jan 2019 21:30 )   PT: 12.40 sec;   INR: 1.08 ratio         PTT - ( 01 Jan 2019 21:30 )  PTT:32.0 sec  Lactate Trend  01-01 @ 21:30 Lactate:1.8         CAPILLARY BLOOD GLUCOSE            RADIOLOGY & ADDITIONAL TESTS:    Imaging Personally Reviewed:  [ ] YES  [ ] NO    HEALTH ISSUES - PROBLEM Dx:

## 2019-01-03 NOTE — PROGRESS NOTE ADULT - ATTENDING COMMENTS
Left hallux plantar wound appears stable. No local erythema or drainage and is unlikely the source of LLE cellulitis. Patient has an appointment next week in podiatry clinic. Local dressing changes with hydrogel and gauze.     Thank you for allowing me to participate in your patient care  Delfino Henderson DPM

## 2019-01-03 NOTE — PROGRESS NOTE ADULT - SUBJECTIVE AND OBJECTIVE BOX
RO LEMUS 63y Male  MRN#: [id number]   CODE STATUS:________      SUBJECTIVE  Patient is a 63y old Male who presents with a chief complaint of right lower extremity swelling and rash (03 Jan 2019 07:35)  Currently admitted to medicine with the primary diagnosis of Cellulitis of right leg  Hospital course has been complicated by _______.   Today is hospital day 2d, and this morning he is _________ and reports ________ overnight events.     Present Today:           Holloway Catheter ()No/ ()Yes? Indication:          Central Line ()No/ ()Yes? Indication:          IV Fluids ()No/ ()Yes? Type:  Rate:  Indication:      OBJECTIVE  PAST MEDICAL & SURGICAL HISTORY  Charcot ankle, right  Hypercholesteremia  Enlarged prostate: BPH  HTN (hypertension)  No significant past surgical history    ALLERGIES:  Augmentin (Rash)  Vitamin D (Hives)    MEDICATIONS:  STANDING MEDICATIONS  aspirin enteric coated 81 milliGRAM(s) Oral daily  chlorhexidine 4% Liquid 1 Application(s) Topical <User Schedule>  doxycycline hyclate Capsule 100 milliGRAM(s) Oral every 12 hours  enoxaparin Injectable 40 milliGRAM(s) SubCutaneous daily  tamsulosin 0.4 milliGRAM(s) Oral at bedtime    PRN MEDICATIONS      VITAL SIGNS: Last 24 Hours  T(C): 35.9 (03 Jan 2019 12:36), Max: 37 (02 Jan 2019 21:00)  T(F): 96.6 (03 Jan 2019 12:36), Max: 98.6 (02 Jan 2019 21:00)  HR: 84 (03 Jan 2019 12:36) (75 - 84)  BP: 127/92 (03 Jan 2019 12:36) (103/57 - 130/56)  BP(mean): --  RR: 18 (03 Jan 2019 12:36) (18 - 18)  SpO2: 95% (03 Jan 2019 08:00) (95% - 97%)    LABS:                        11.8   8.01  )-----------( 213      ( 03 Jan 2019 07:47 )             34.5     01-03    143  |  105  |  11  ----------------------------<  90  3.7   |  26  |  0.6<L>    Ca    8.1<L>      03 Jan 2019 07:47  Mg     2.2     01-02    TPro  6.8  /  Alb  3.7  /  TBili  0.3  /  DBili  x   /  AST  33  /  ALT  25  /  AlkPhos  54  01-01    PT/INR - ( 01 Jan 2019 21:30 )   PT: 12.40 sec;   INR: 1.08 ratio         PTT - ( 01 Jan 2019 21:30 )  PTT:32.0 sec      Sedimentation Rate, Erythrocyte: 68 mm/Hr <H> (01-03-19 @ 11:42)      Culture - Blood (collected 01 Jan 2019 21:24)  Source: .Blood Blood  Preliminary Report (03 Jan 2019 06:01):    No growth to date.    Culture - Blood (collected 01 Jan 2019 21:24)  Source: .Blood Blood  Preliminary Report (03 Jan 2019 06:01):    No growth to date.          RADIOLOGY:      PHYSICAL EXAM:    GENERAL: NAD, well-developed, AAOx3  HEENT:  Atraumatic, Normocephalic. EOMI, PERRLA, conjunctiva and sclera clear, No JVD  PULMONARY: Clear to auscultation bilaterally; No wheeze  CARDIOVASCULAR: Regular rate and rhythm; No murmurs, rubs, or gallops  GASTROINTESTINAL: Soft, Nontender, Nondistended; Bowel sounds present  MUSCULOSKELETAL:  2+ Peripheral Pulses, No clubbing, cyanosis, or edema  NEUROLOGY: non-focal  SKIN: No rashes or lesions            ASSESSMENT & PLAN RO LEMUS 63y Male  MRN#: [id number]     SUBJECTIVE  Patient is a 63y old Male who presents with a chief complaint of right lower extremity swelling and rash (03 Jan 2019 07:35)  Currently admitted to medicine with the primary diagnosis of Cellulitis of right leg    OBJECTIVE  PAST MEDICAL & SURGICAL HISTORY  Charcot ankle, right  Hypercholesteremia  Enlarged prostate: BPH  HTN (hypertension)  No significant past surgical history    ALLERGIES:  Augmentin (Rash)  Vitamin D (Hives)    MEDICATIONS:  STANDING MEDICATIONS  aspirin enteric coated 81 milliGRAM(s) Oral daily  chlorhexidine 4% Liquid 1 Application(s) Topical <User Schedule>  doxycycline hyclate Capsule 100 milliGRAM(s) Oral every 12 hours  enoxaparin Injectable 40 milliGRAM(s) SubCutaneous daily  tamsulosin 0.4 milliGRAM(s) Oral at bedtime    PRN MEDICATIONS      VITAL SIGNS: Last 24 Hours  T(C): 35.9 (03 Jan 2019 12:36), Max: 37 (02 Jan 2019 21:00)  T(F): 96.6 (03 Jan 2019 12:36), Max: 98.6 (02 Jan 2019 21:00)  HR: 84 (03 Jan 2019 12:36) (75 - 84)  BP: 127/92 (03 Jan 2019 12:36) (103/57 - 130/56)  BP(mean): --  RR: 18 (03 Jan 2019 12:36) (18 - 18)  SpO2: 95% (03 Jan 2019 08:00) (95% - 97%)    LABS:                        11.8   8.01  )-----------( 213      ( 03 Jan 2019 07:47 )             34.5     01-03    143  |  105  |  11  ----------------------------<  90  3.7   |  26  |  0.6<L>    Ca    8.1<L>      03 Jan 2019 07:47  Mg     2.2     01-02    TPro  6.8  /  Alb  3.7  /  TBili  0.3  /  DBili  x   /  AST  33  /  ALT  25  /  AlkPhos  54  01-01    PT/INR - ( 01 Jan 2019 21:30 )   PT: 12.40 sec;   INR: 1.08 ratio         PTT - ( 01 Jan 2019 21:30 )  PTT:32.0 sec  Sedimentation Rate, Erythrocyte: 68 mm/Hr <H> (01-03-19 @ 11:42)  Culture - Blood (collected 01 Jan 2019 21:24)  Source: .Blood Blood  Preliminary Report (03 Jan 2019 06:01):    No growth to date.    Culture - Blood (collected 01 Jan 2019 21:24)  Source: .Blood Blood  Preliminary Report (03 Jan 2019 06:01):    No growth to date.    PHYSICAL EXAM:  GENERAL: NAD, speaks in full sentences, no signs of respiratory distress  	CHEST/LUNG: Clear to auscultation bilaterally; No wheeze; No crackles; No accessory muscles used  	HEART: Regular rate and rhythm; No murmurs;   	ABDOMEN: Soft, Nontender, Nondistended; Bowel sounds present; No guarding  	EXTREMITIES: Right lower extremity swelling and redness, no blanching, warm to touch, faint pulses, palpabale pulses  	PSYCH: AAOx3  	NEUROLOGY: non-focal    ASSESSMENT & PLAN  63 year old male with PMH of HTN, DLD, BPH, PVD follows up with Dr. huertas presented here for right lower extremity swelling and rash since 1 day.    #)Right lower extremity swelling/rash/non purulent cellulitis  -Clindamycin changed to oral doxycycline 100mg q12 for 8 more days.  -Last day for doxycycline is 1/10/2019.  -Follow up with blood cultures  -ID follow up.  -No vascular disease on right side.  -Anticipated for tomorrow.  -Podiatry consult placed for the right toe lesion.  -Xray of the right ankle placed to look for osteomyelitis.    #)HTN not on any meds controlled    #)PVD  c/w aspirin    #)BPH   c/w flomax    #)Diet: DASH diet  #)Activtiy: OOBTC  #)DVT ppx: Lovenox  #)GI ppx: Not indicated  #)Dispo: From home  #)Code; Full.

## 2019-01-03 NOTE — PROGRESS NOTE ADULT - ASSESSMENT
63 year old male vasculopath with PMHx of HTN, HLD, BPH, PVD s/p balloon angioplasty here with RLE cellulitis as well as small R hallux ulcer chronic    local hallux wound care per pod recs appreciated  po abx per ID recs- appreciated eval  vascular studies no new severe stenosis- patient should f/u with Dr. Jones as outpatient  dvt ppx  c/w home flomax after dinner for bph  Dispo- anticipate for d/c group home tomorrow

## 2019-01-03 NOTE — PROGRESS NOTE ADULT - SUBJECTIVE AND OBJECTIVE BOX
PROGRESS NOTE   Patient is a 63y old  Male who presents with a chief complaint of right lower extremity swelling and rash (02 Jan 2019 14:59)    Patient seen at bedside with attending. Patient relates that his leg is considerably better than the previous days. Patient has no other complaints at this time.    HPI:  63 year old male with PMH of HTN, DLD, BPH, PVD follows up with Dr. huertas presented here for right lower extremity swelling, paintwice as normal and rash since 1 day. Denies any itching, tick byte, travel history, sick contacts, fever, chills, SOB, palpitations, dizziness, weight loss, loss of appetite, abd pain, diarrhea, joint pains, dysuria.       vitals in ED: 130.78, 77, 97.2F, 20, 97% on room air, LAbs no inc WBC count, Potassium 3.2, Xray of foot, vasc arterial and venous duplex was done in ED received clindamycin and potassium 40 once. (01 Jan 2019 23:40)      REVIEW OF SYSTEMS  Constitutional: No weakness, fever, or chills  Eyes/ENT: No visual changes; no vertigo r throat pain  Neck: No pain or stiffness  Respiratory: No cough, wheezing, or shortness of breath  Cardiovascular: No chest pain or palpitations  Gastrointestinal: No abdominal or epigastric pain. No nausea or vomiting  Genitourinary: No dysuria, urgency or incontinence   Neurological: No numbness or tingling  Skin: No itching burning, rashes or lesions  Vascular: + Edema RLE  All other review of systems is negative unless indicated above.    Vital Signs Last 24 Hrs  T(C): 35.7 (03 Jan 2019 05:00), Max: 37.4 (02 Jan 2019 12:32)  T(F): 96.2 (03 Jan 2019 05:00), Max: 99.3 (02 Jan 2019 12:32)  HR: 79 (03 Jan 2019 05:00) (69 - 79)  BP: 130/56 (03 Jan 2019 05:00) (103/57 - 130/56)  BP(mean): --  RR: 18 (03 Jan 2019 05:00) (18 - 18)  SpO2: 97% (02 Jan 2019 20:42) (95% - 97%)                          11.8   7.41  )-----------( 204      ( 02 Jan 2019 12:03 )             34.3               01-02    141  |  100  |  12  ----------------------------<  94  3.3<L>   |  27  |  0.7    Ca    7.9<L>      02 Jan 2019 05:24  Mg     2.2     01-02    TPro  6.8  /  Alb  3.7  /  TBili  0.3  /  DBili  x   /  AST  33  /  ALT  25  /  AlkPhos  54  01-01      PHYSICAL EXAM  GEN: RO LEMUS is a pleasant well-nourished, well developed 63y Male in no acute distress, alert awake, and oriented to person, place and time.   LE Focused:  left LE edema with decreased erythema and edema. left distal hallus wound with granular wound base.     A:  LLE cellulitis  Left hallux wound    P:  Patient examined and evaluated.   Patient sharply debrided to level of skin using #15 blade   Patient dressed with hydrogel/DSD/Hawk  Patient to F/u in clinic at his appointment that has already been made for next week  Will f/u with attending for any further recommendations.

## 2019-01-04 ENCOUNTER — TRANSCRIPTION ENCOUNTER (OUTPATIENT)
Age: 64
End: 2019-01-04

## 2019-01-04 VITALS
RESPIRATION RATE: 18 BRPM | HEART RATE: 80 BPM | DIASTOLIC BLOOD PRESSURE: 59 MMHG | SYSTOLIC BLOOD PRESSURE: 118 MMHG | TEMPERATURE: 97 F

## 2019-01-04 LAB
ANION GAP SERPL CALC-SCNC: 13 MMOL/L — SIGNIFICANT CHANGE UP (ref 7–14)
BUN SERPL-MCNC: 13 MG/DL — SIGNIFICANT CHANGE UP (ref 10–20)
CALCIUM SERPL-MCNC: 8.3 MG/DL — LOW (ref 8.5–10.1)
CHLORIDE SERPL-SCNC: 104 MMOL/L — SIGNIFICANT CHANGE UP (ref 98–110)
CO2 SERPL-SCNC: 26 MMOL/L — SIGNIFICANT CHANGE UP (ref 17–32)
CREAT SERPL-MCNC: 0.6 MG/DL — LOW (ref 0.7–1.5)
GLUCOSE SERPL-MCNC: 88 MG/DL — SIGNIFICANT CHANGE UP (ref 70–99)
HCT VFR BLD CALC: 36 % — LOW (ref 42–52)
HGB BLD-MCNC: 12.1 G/DL — LOW (ref 14–18)
MCHC RBC-ENTMCNC: 30 PG — SIGNIFICANT CHANGE UP (ref 27–31)
MCHC RBC-ENTMCNC: 33.6 G/DL — SIGNIFICANT CHANGE UP (ref 32–37)
MCV RBC AUTO: 89.1 FL — SIGNIFICANT CHANGE UP (ref 80–94)
NRBC # BLD: 0 /100 WBCS — SIGNIFICANT CHANGE UP (ref 0–0)
PLATELET # BLD AUTO: 240 K/UL — SIGNIFICANT CHANGE UP (ref 130–400)
POTASSIUM SERPL-MCNC: 4.2 MMOL/L — SIGNIFICANT CHANGE UP (ref 3.5–5)
POTASSIUM SERPL-SCNC: 4.2 MMOL/L — SIGNIFICANT CHANGE UP (ref 3.5–5)
RBC # BLD: 4.04 M/UL — LOW (ref 4.7–6.1)
RBC # FLD: 14.1 % — SIGNIFICANT CHANGE UP (ref 11.5–14.5)
SODIUM SERPL-SCNC: 143 MMOL/L — SIGNIFICANT CHANGE UP (ref 135–146)
WBC # BLD: 7.59 K/UL — SIGNIFICANT CHANGE UP (ref 4.8–10.8)
WBC # FLD AUTO: 7.59 K/UL — SIGNIFICANT CHANGE UP (ref 4.8–10.8)

## 2019-01-04 RX ORDER — TAMSULOSIN HYDROCHLORIDE 0.4 MG/1
1 CAPSULE ORAL
Qty: 0 | Refills: 0 | COMMUNITY

## 2019-01-04 RX ORDER — ASPIRIN/CALCIUM CARB/MAGNESIUM 324 MG
1 TABLET ORAL
Qty: 0 | Refills: 0 | COMMUNITY

## 2019-01-04 RX ORDER — OMEGA-3 ACID ETHYL ESTERS 1 G
1 CAPSULE ORAL
Qty: 30 | Refills: 0
Start: 2019-01-04 | End: 2019-02-02

## 2019-01-04 RX ORDER — OMEGA-3 ACID ETHYL ESTERS 1 G
0 CAPSULE ORAL
Qty: 0 | Refills: 0 | COMMUNITY

## 2019-01-04 RX ORDER — TAMSULOSIN HYDROCHLORIDE 0.4 MG/1
1 CAPSULE ORAL
Qty: 30 | Refills: 0 | OUTPATIENT
Start: 2019-01-04 | End: 2019-02-02

## 2019-01-04 RX ORDER — TAMSULOSIN HYDROCHLORIDE 0.4 MG/1
1 CAPSULE ORAL
Qty: 30 | Refills: 0
Start: 2019-01-04 | End: 2019-02-02

## 2019-01-04 RX ORDER — ASPIRIN/CALCIUM CARB/MAGNESIUM 324 MG
1 TABLET ORAL
Qty: 30 | Refills: 0
Start: 2019-01-04 | End: 2019-02-02

## 2019-01-04 RX ADMIN — CHLORHEXIDINE GLUCONATE 1 APPLICATION(S): 213 SOLUTION TOPICAL at 05:10

## 2019-01-04 RX ADMIN — ENOXAPARIN SODIUM 40 MILLIGRAM(S): 100 INJECTION SUBCUTANEOUS at 11:00

## 2019-01-04 RX ADMIN — Medication 100 MILLIGRAM(S): at 05:10

## 2019-01-04 RX ADMIN — Medication 81 MILLIGRAM(S): at 11:00

## 2019-01-04 NOTE — DISCHARGE NOTE ADULT - HOSPITAL COURSE
63 year old male with PMH of HTN, DLD, BPH, PVD follows up with Dr. huertas presented here for right lower extremity swelling and rash since 1 day.    #)Right lower extremity swelling/rash/non purulent cellulitis  -Clindamycin changed to oral doxycycline 100mg q12 for 8 more days.  -Last day for doxycycline is 1/10/2019.  -Follow up with blood cultures  -ID follow up.  -No vascular disease on right side.  -Podiatry consult placed for the right toe lesion.  -Xray of the right ankle negative for bone involvement    #)HTN not on any meds controlled    #)PVD  c/w aspirin    #)BPH   c/w flomax    #)Diet: DASH diet  #)Activtiy: OOBTC  #)DVT ppx: Lovenox  #)GI ppx: Not indicated  #)Dispo: From home  #)Code; Full.

## 2019-01-04 NOTE — PROGRESS NOTE ADULT - ATTENDING COMMENTS
Patient seen and examined independently of resident. Case discussed with housestaff, nursing and patient  Patient seen independently of resident.  >30 minutes spent coordinating discharge planning, medicine reconciliation, follow up plan, and direct patient encounter  see discharge summary for further recommendation

## 2019-01-04 NOTE — DISCHARGE NOTE ADULT - PLAN OF CARE
improved You came here with the swelling and the redness of the right lower extremity on the backdrop of known peripheral vascular disease (you have history of balloon angioplasty) . You were treated with the iv antibiotics and the leg elevation. Will discharge you to adult home today.

## 2019-01-04 NOTE — DISCHARGE NOTE ADULT - MEDICATION SUMMARY - MEDICATIONS TO TAKE
I will START or STAY ON the medications listed below when I get home from the hospital:    Aspirin Low Dose 81 mg oral delayed release tablet  -- 1 tab(s) by mouth once a day  -- Indication: For Preventive    tamsulosin 0.4 mg oral capsule  -- 1 cap(s) by mouth once a day at bedtime  -- Indication: For bph    doxycycline monohydrate 100 mg oral capsule  -- 1 cap(s) by mouth every 12 hours last day to take this tablet is 1/10/2019  -- Indication: For infection    Omega-3 oral capsule  -- 1 tab(s) by mouth once a day   -- Indication: For supplement

## 2019-01-04 NOTE — DISCHARGE NOTE ADULT - CARE PROVIDER_API CALL
Sal Thacker (MD), [address] [place], [place] [id number]  [address] [place] [address]  Phone: [phone]  Fax: [phone]    Estrada Jones), Surgery; Vascular Surgery  [address] [place] [address]  Phone: [phone]  Fax: [phone]

## 2019-01-04 NOTE — DISCHARGE NOTE ADULT - CARE PLAN
Principal Discharge DX:	Cellulitis of right leg  Goal:	improved  Assessment and plan of treatment:	You came here with the swelling and the redness of the right lower extremity on the backdrop of known peripheral vascular disease (you have history of balloon angioplasty) . You were treated with the iv antibiotics and the leg elevation. Will discharge you to adult home today.

## 2019-01-04 NOTE — PROGRESS NOTE ADULT - REASON FOR ADMISSION
right lower extremity swelling and rash

## 2019-01-04 NOTE — PROGRESS NOTE ADULT - ASSESSMENT
63 year old male vasculopath with PMHx of HTN, HLD, BPH, PVD s/p balloon angioplasty here with RLE cellulitis as well as small R hallux ulcer chronic    local hallux wound care per pod recs appreciated  po abx per ID recs- complete course of doxy as outpatient  vascular studies no new severe stenosis- patient should f/u with Dr. Jones as outpatient  dvt ppx  c/w home flomax after dinner for bph  Dispo- medically stable for discharge to adult home today

## 2019-01-04 NOTE — DISCHARGE NOTE ADULT - PATIENT PORTAL LINK FT
You can access the "[employer] Patient Portal, offered by [facility], by registering with the following website: [email or web address]

## 2019-01-04 NOTE — PROGRESS NOTE ADULT - SUBJECTIVE AND OBJECTIVE BOX
RO LEMUS  63y  Male      Patient is a 63y old  Male who presents with a chief complaint of right lower extremity cellulitis (04 Jan 2019 10:32)      INTERVAL HPI/OVERNIGHT EVENTS: RLE improved. bearing weight/ walking on leg without incident. appetite is good. ready to leave hospital      REVIEW OF SYSTEMS:  as above  All other review of systems negative    T(C): 36.3 (01-04-19 @ 13:33), Max: 36.3 (01-04-19 @ 13:33)  HR: 80 (01-04-19 @ 13:33) (71 - 81)  BP: 118/59 (01-04-19 @ 13:33) (118/59 - 125/69)  RR: 18 (01-04-19 @ 13:33) (18 - 18)  SpO2: --  Wt(kg): --Vital Signs Last 24 Hrs  T(C): 36.3 (04 Jan 2019 13:33), Max: 36.3 (04 Jan 2019 13:33)  T(F): 97.3 (04 Jan 2019 13:33), Max: 97.3 (04 Jan 2019 13:33)  HR: 80 (04 Jan 2019 13:33) (71 - 81)  BP: 118/59 (04 Jan 2019 13:33) (118/59 - 125/69)  BP(mean): --  RR: 18 (04 Jan 2019 13:33) (18 - 18)  SpO2: --        PHYSICAL EXAM:  GENERAL: NAD  PSYCH: no agitation, baseline mentation  NERVOUS SYSTEM:  Alert & Oriented X3, no new focal deficits  PULMONARY: Clear to percussion bilaterally; No rales, rhonchi, wheezing, or rubs  CARDIOVASCULAR: Regular rate and rhythm; No murmurs, rubs, or gallops  GI: Soft, Nontender, Nondistended; Bowel sounds present  EXTREMITIES:  RLE erythema improving, b/l pvd hyperpigmentation changes as prior    Consultant(s) Notes Reviewed:  [x ] YES  [ ] NO    Discussed with Consultants/Other Providers [ x] YES     LABS                          12.1   7.59  )-----------( 240      ( 04 Jan 2019 08:04 )             36.0     01-04    143  |  104  |  13  ----------------------------<  88  4.2   |  26  |  0.6<L>    Ca    8.3<L>      04 Jan 2019 08:04            Lactate Trend  01-01 @ 21:30 Lactate:1.8         CAPILLARY BLOOD GLUCOSE            RADIOLOGY & ADDITIONAL TESTS:    Imaging Personally Reviewed:  [ ] YES  [ ] NO    HEALTH ISSUES - PROBLEM Dx:

## 2019-01-04 NOTE — DISCHARGE NOTE ADULT - CARE PROVIDERS DIRECT ADDRESSES
[email or web address]

## 2019-01-07 LAB
CULTURE RESULTS: SIGNIFICANT CHANGE UP
CULTURE RESULTS: SIGNIFICANT CHANGE UP
SPECIMEN SOURCE: SIGNIFICANT CHANGE UP
SPECIMEN SOURCE: SIGNIFICANT CHANGE UP

## 2019-01-08 DIAGNOSIS — N40.0 BENIGN PROSTATIC HYPERPLASIA WITHOUT LOWER URINARY TRACT SYMPTOMS: ICD-10-CM

## 2019-01-08 DIAGNOSIS — M14.671 CHARCOT'S JOINT, RIGHT ANKLE AND FOOT: ICD-10-CM

## 2019-01-08 DIAGNOSIS — E87.6 HYPOKALEMIA: ICD-10-CM

## 2019-01-08 DIAGNOSIS — I73.9 PERIPHERAL VASCULAR DISEASE, UNSPECIFIED: ICD-10-CM

## 2019-01-08 DIAGNOSIS — L97.519 NON-PRESSURE CHRONIC ULCER OF OTHER PART OF RIGHT FOOT WITH UNSPECIFIED SEVERITY: ICD-10-CM

## 2019-01-08 DIAGNOSIS — I10 ESSENTIAL (PRIMARY) HYPERTENSION: ICD-10-CM

## 2019-01-08 DIAGNOSIS — Z79.82 LONG TERM (CURRENT) USE OF ASPIRIN: ICD-10-CM

## 2019-01-08 DIAGNOSIS — M79.89 OTHER SPECIFIED SOFT TISSUE DISORDERS: ICD-10-CM

## 2019-01-08 DIAGNOSIS — Z88.8 ALLERGY STATUS TO OTHER DRUGS, MEDICAMENTS AND BIOLOGICAL SUBSTANCES: ICD-10-CM

## 2019-01-08 DIAGNOSIS — L03.115 CELLULITIS OF RIGHT LOWER LIMB: ICD-10-CM

## 2019-01-08 DIAGNOSIS — E78.5 HYPERLIPIDEMIA, UNSPECIFIED: ICD-10-CM

## 2019-01-10 ENCOUNTER — APPOINTMENT (OUTPATIENT)
Dept: PODIATRY | Facility: CLINIC | Age: 64
End: 2019-01-10

## 2019-01-31 ENCOUNTER — APPOINTMENT (OUTPATIENT)
Dept: PODIATRY | Facility: CLINIC | Age: 64
End: 2019-01-31
Payer: MEDICAID

## 2019-01-31 ENCOUNTER — OUTPATIENT (OUTPATIENT)
Dept: OUTPATIENT SERVICES | Facility: HOSPITAL | Age: 64
LOS: 1 days | Discharge: HOME | End: 2019-01-31

## 2019-01-31 DIAGNOSIS — M79.674 PAIN IN RIGHT TOE(S): ICD-10-CM

## 2019-01-31 PROCEDURE — 99212 OFFICE O/P EST SF 10 MIN: CPT | Mod: NC

## 2019-01-31 NOTE — ASSESSMENT
[FreeTextEntry1] : -Light debridement of hyperkeratotic skin lesion to the healthy superficial skin using suture kit.\par -can continue with medihoney pad to right hallux. Wound has essentially healed \par -pain persist on the right 1st MPJ, will re-evaluate upon next visit, otherwise f/u in 2 months\par

## 2019-01-31 NOTE — HISTORY OF PRESENT ILLNESS
[FreeTextEntry1] : 64 y/o male returns for right hallux wound and he states that he banged his right foot 2 days ago and he has soreness on the right 1st MPJ medially.  right plantar hallux wound healed.\par

## 2019-01-31 NOTE — PHYSICAL EXAM
[General Appearance - Alert] : alert [General Appearance - In No Acute Distress] : in no acute distress [FreeTextEntry1] : right plantar hallux wound healed. mild erythema/edema noted RLE, non infected  b/l venous stasis dermatitis

## 2019-02-06 ENCOUNTER — APPOINTMENT (OUTPATIENT)
Dept: VASCULAR SURGERY | Facility: CLINIC | Age: 64
End: 2019-02-06
Payer: MEDICAID

## 2019-02-06 DIAGNOSIS — I87.2 VENOUS INSUFFICIENCY (CHRONIC) (PERIPHERAL): ICD-10-CM

## 2019-02-06 PROCEDURE — 99213 OFFICE O/P EST LOW 20 MIN: CPT

## 2019-02-26 ENCOUNTER — APPOINTMENT (OUTPATIENT)
Dept: VASCULAR SURGERY | Facility: CLINIC | Age: 64
End: 2019-02-26
Payer: MEDICAID

## 2019-02-26 PROCEDURE — 36478 ENDOVENOUS LASER 1ST VEIN: CPT | Mod: RT

## 2019-03-12 ENCOUNTER — APPOINTMENT (OUTPATIENT)
Dept: VASCULAR SURGERY | Facility: CLINIC | Age: 64
End: 2019-03-12
Payer: MEDICAID

## 2019-03-12 DIAGNOSIS — I83.11 VARICOSE VEINS OF RIGHT LOWER EXTREMITY WITH INFLAMMATION: ICD-10-CM

## 2019-03-12 PROCEDURE — 99212 OFFICE O/P EST SF 10 MIN: CPT

## 2019-03-12 PROCEDURE — 93971 EXTREMITY STUDY: CPT

## 2019-03-21 ENCOUNTER — APPOINTMENT (OUTPATIENT)
Dept: PODIATRY | Facility: CLINIC | Age: 64
End: 2019-03-21
Payer: MEDICAID

## 2019-03-21 ENCOUNTER — OUTPATIENT (OUTPATIENT)
Dept: OUTPATIENT SERVICES | Facility: HOSPITAL | Age: 64
LOS: 1 days | Discharge: HOME | End: 2019-03-21

## 2019-03-21 VITALS
HEIGHT: 76 IN | SYSTOLIC BLOOD PRESSURE: 125 MMHG | BODY MASS INDEX: 30.32 KG/M2 | DIASTOLIC BLOOD PRESSURE: 75 MMHG | HEART RATE: 69 BPM | WEIGHT: 249 LBS

## 2019-03-21 PROCEDURE — 99212 OFFICE O/P EST SF 10 MIN: CPT | Mod: NC

## 2019-03-22 NOTE — PHYSICAL EXAM
[General Appearance - Alert] : alert [General Appearance - In No Acute Distress] : in no acute distress [FreeTextEntry1] :   b/l venous stasis dermatitis, nail dystrophy x 10. RIght plantar hallux hyperkeratosis (previous region of ulceration)

## 2019-03-22 NOTE — ASSESSMENT
[FreeTextEntry1] : -Light debridement of hyperkeratotic skin lesion to the healthy superficial skin\par -no more dressing needed left plantar hallux, wound is healed.\par - Aseptic debridement of all fungal nails x 10. patient notes improvement with periodic nail debridement\par return 2 months\par \par

## 2019-03-26 DIAGNOSIS — R26.2 DIFFICULTY IN WALKING, NOT ELSEWHERE CLASSIFIED: ICD-10-CM

## 2019-03-26 DIAGNOSIS — B35.1 TINEA UNGUIUM: ICD-10-CM

## 2019-03-26 DIAGNOSIS — Q82.8 OTHER SPECIFIED CONGENITAL MALFORMATIONS OF SKIN: ICD-10-CM

## 2019-05-16 ENCOUNTER — OUTPATIENT (OUTPATIENT)
Dept: OUTPATIENT SERVICES | Facility: HOSPITAL | Age: 64
LOS: 1 days | Discharge: HOME | End: 2019-05-16

## 2019-05-16 ENCOUNTER — APPOINTMENT (OUTPATIENT)
Dept: PODIATRY | Facility: CLINIC | Age: 64
End: 2019-05-16
Payer: MEDICAID

## 2019-05-16 VITALS
HEIGHT: 76 IN | WEIGHT: 254 LBS | BODY MASS INDEX: 30.93 KG/M2 | HEART RATE: 77 BPM | SYSTOLIC BLOOD PRESSURE: 125 MMHG | DIASTOLIC BLOOD PRESSURE: 77 MMHG

## 2019-05-16 PROCEDURE — 11721 DEBRIDE NAIL 6 OR MORE: CPT | Mod: NC

## 2019-05-22 NOTE — ASSESSMENT
[FreeTextEntry1] : -Light debridement of hyperkeratotic skin lesion to the healthy superficial skin\par -no more dressing needed left plantar hallux, wound is healed.\par - Aseptic debridement of all fungal nails x 10. patient notes improvement with periodic nail debridement\par return 2 months\par \par \par

## 2019-05-23 ENCOUNTER — APPOINTMENT (OUTPATIENT)
Dept: PODIATRY | Facility: CLINIC | Age: 64
End: 2019-05-23

## 2019-05-24 DIAGNOSIS — B35.1 TINEA UNGUIUM: ICD-10-CM

## 2019-05-24 DIAGNOSIS — M79.672 PAIN IN LEFT FOOT: ICD-10-CM

## 2019-05-24 DIAGNOSIS — M79.671 PAIN IN RIGHT FOOT: ICD-10-CM

## 2019-07-18 ENCOUNTER — OUTPATIENT (OUTPATIENT)
Dept: OUTPATIENT SERVICES | Facility: HOSPITAL | Age: 64
LOS: 1 days | Discharge: HOME | End: 2019-07-18

## 2019-07-18 ENCOUNTER — APPOINTMENT (OUTPATIENT)
Dept: PODIATRY | Facility: CLINIC | Age: 64
End: 2019-07-18
Payer: MEDICAID

## 2019-07-18 VITALS
HEIGHT: 76 IN | DIASTOLIC BLOOD PRESSURE: 79 MMHG | BODY MASS INDEX: 30.93 KG/M2 | HEART RATE: 80 BPM | SYSTOLIC BLOOD PRESSURE: 127 MMHG | WEIGHT: 254 LBS

## 2019-07-18 PROCEDURE — 11721 DEBRIDE NAIL 6 OR MORE: CPT | Mod: NC

## 2019-07-19 NOTE — ASSESSMENT
[FreeTextEntry1] : -Light debridement of hyperkeratotic skin lesion to the healthy superficial skin\par - Aseptic debridement of all fungal nails x 10. patient notes improvement with periodic nail debridement\par return 2 months\par \par \par

## 2019-09-19 ENCOUNTER — OUTPATIENT (OUTPATIENT)
Dept: OUTPATIENT SERVICES | Facility: HOSPITAL | Age: 64
LOS: 1 days | Discharge: HOME | End: 2019-09-19

## 2019-09-19 ENCOUNTER — APPOINTMENT (OUTPATIENT)
Dept: PODIATRY | Facility: CLINIC | Age: 64
End: 2019-09-19
Payer: MEDICAID

## 2019-09-19 DIAGNOSIS — R26.2 DIFFICULTY IN WALKING, NOT ELSEWHERE CLASSIFIED: ICD-10-CM

## 2019-09-19 DIAGNOSIS — B35.1 TINEA UNGUIUM: ICD-10-CM

## 2019-09-19 PROCEDURE — 11721 DEBRIDE NAIL 6 OR MORE: CPT | Mod: NC

## 2019-09-22 NOTE — HISTORY OF PRESENT ILLNESS
[FreeTextEntry1] : 65 y/o male returns to clinic for elongated painful mycotic toenails x10.  no changes from last visit\par

## 2019-10-22 ENCOUNTER — LABORATORY RESULT (OUTPATIENT)
Age: 64
End: 2019-10-22

## 2019-10-23 ENCOUNTER — APPOINTMENT (OUTPATIENT)
Dept: SURGERY | Facility: CLINIC | Age: 64
End: 2019-10-23
Payer: MEDICAID

## 2019-10-23 ENCOUNTER — OUTPATIENT (OUTPATIENT)
Dept: OUTPATIENT SERVICES | Facility: HOSPITAL | Age: 64
LOS: 1 days | Discharge: HOME | End: 2019-10-23

## 2019-10-23 VITALS
DIASTOLIC BLOOD PRESSURE: 79 MMHG | HEIGHT: 76 IN | WEIGHT: 254 LBS | BODY MASS INDEX: 30.93 KG/M2 | SYSTOLIC BLOOD PRESSURE: 130 MMHG

## 2019-10-23 PROCEDURE — 99203 OFFICE O/P NEW LOW 30 MIN: CPT | Mod: 25

## 2019-10-23 PROCEDURE — 10060 I&D ABSCESS SIMPLE/SINGLE: CPT

## 2019-10-23 NOTE — PHYSICAL EXAM
[Alert] : alert [Calm] : calm [JVD] : no jugular venous distention  [Stool Sample Taken] : No stool obtained  on rectal exam [de-identified] : Normal [Purpura] : no purpura  [de-identified] : 5 cm abscess in the right neck.  [de-identified] : Normal

## 2019-10-23 NOTE — HISTORY OF PRESENT ILLNESS
[de-identified] : Bhavik is a 64 year old man who presents to the office with right neck abscess. He had this cyst for some time. It had drained spontaneously many years ago and  more recently yesterday. \par He is on Keflex. \par He denies fever. He denies any immuno suppresion.

## 2019-10-23 NOTE — ASSESSMENT
[FreeTextEntry1] : Bhavik is a 64 year old man who presents to the office with right neck abscess. He had this cyst for some time. It had drained spontaneously many years ago and  more recently yesterday. \par He is on Keflex. \par He denies fever. He denies any immuno suppression. \par \par We explained in great detail the pathophysiology of the disease process. We guicho diagrams and discussed the workup for diagnosis and management.\par The Post operative care was explained to the patient. He was counselled on diet , exercise and wound care.\par We discussed the pathology and surgery with him.\par \par The various options were explained to the patient. The Risk , benefit and alternatives were discussed. We discussed recovery and possible complications.\par \par Procedure Note:\par An incision and Drainage of the abscess was done under aseptic precautions, under local anesthesia.\par A consent was taken before the procedure. After injection of local anesthesia the most fluctuant area was lanced with a scalpel. The incision was then made bigger and using a clamp all loculations were broken. The cavity was irrigated with Betadine and peroxide. The cavity was then packed with 1/4 inch gauze packing. The packing was then dressed with gauze and tape. More than 10 ml of pus came out and it was sent for culture.\par \par He is unsure about the surgery. \par \par We discussed the importance of close follow up. \par We informed that he needs to follow up in 2 weeks. \par We also informed that he can call us if anything changes or has any questions.\par \par

## 2019-10-24 DIAGNOSIS — L02.11 CUTANEOUS ABSCESS OF NECK: ICD-10-CM

## 2019-11-14 ENCOUNTER — APPOINTMENT (OUTPATIENT)
Dept: PODIATRY | Facility: CLINIC | Age: 64
End: 2019-11-14
Payer: MEDICAID

## 2019-11-14 ENCOUNTER — APPOINTMENT (OUTPATIENT)
Dept: SURGERY | Facility: CLINIC | Age: 64
End: 2019-11-14
Payer: MEDICAID

## 2019-11-14 ENCOUNTER — APPOINTMENT (OUTPATIENT)
Dept: SURGERY | Facility: CLINIC | Age: 64
End: 2019-11-14

## 2019-11-14 ENCOUNTER — OUTPATIENT (OUTPATIENT)
Dept: OUTPATIENT SERVICES | Facility: HOSPITAL | Age: 64
LOS: 1 days | Discharge: HOME | End: 2019-11-14

## 2019-11-14 VITALS
BODY MASS INDEX: 31.05 KG/M2 | DIASTOLIC BLOOD PRESSURE: 89 MMHG | SYSTOLIC BLOOD PRESSURE: 127 MMHG | HEART RATE: 87 BPM | HEIGHT: 76 IN | WEIGHT: 255 LBS

## 2019-11-14 VITALS
HEIGHT: 76 IN | WEIGHT: 253 LBS | DIASTOLIC BLOOD PRESSURE: 76 MMHG | BODY MASS INDEX: 30.81 KG/M2 | SYSTOLIC BLOOD PRESSURE: 128 MMHG

## 2019-11-14 DIAGNOSIS — L02.11 CUTANEOUS ABSCESS OF NECK: ICD-10-CM

## 2019-11-14 PROCEDURE — 99211 OFF/OP EST MAY X REQ PHY/QHP: CPT

## 2019-11-14 PROCEDURE — 11721 DEBRIDE NAIL 6 OR MORE: CPT | Mod: NC

## 2019-11-14 NOTE — HISTORY OF PRESENT ILLNESS
[de-identified] : pt reports no pain, or drainage from the abscess\par he denies fevers or chills\par on exam the wound is clean, nearly fully healed with no erythema or exudates, non-tender\par good healing s/p I&D neck abscess

## 2019-11-15 NOTE — HISTORY OF PRESENT ILLNESS
[FreeTextEntry1] : 63 y/o male returns to clinic for elongated painful mycotic toenails x10. pt states that he started to have this rash lateral aspect of the knee, nurse applied cream, otherwise no changes. \par

## 2019-11-23 NOTE — HISTORY OF PRESENT ILLNESS
[FreeTextEntry1] : 62 y/o male returns for right hallux wound and right plantar hallux wound healed.\par he also complaints elongated painful mycotic toenails x10.  He states that he was seen by vascular and had a ultrasound done which showed closed saphenous vein.\par  None

## 2020-01-16 ENCOUNTER — APPOINTMENT (OUTPATIENT)
Dept: PODIATRY | Facility: CLINIC | Age: 65
End: 2020-01-16
Payer: MEDICAID

## 2020-01-16 ENCOUNTER — OUTPATIENT (OUTPATIENT)
Dept: OUTPATIENT SERVICES | Facility: HOSPITAL | Age: 65
LOS: 1 days | Discharge: HOME | End: 2020-01-16

## 2020-01-16 PROCEDURE — 11721 DEBRIDE NAIL 6 OR MORE: CPT | Mod: NC

## 2020-01-16 NOTE — ASSESSMENT
[FreeTextEntry1] : -Light debridement of hyperkeratotic skin lesion to the healthy superficial skin R 4th toe \par - Aseptic debridement of all fungal nails x 10. patient notes improvement with periodic nail debridement\par return 2 months\par \par \par

## 2020-01-16 NOTE — HISTORY OF PRESENT ILLNESS
[FreeTextEntry1] : 65 y/o male returns to clinic for elongated painful mycotic toenails x10 and corn on the 4th R toe. \par No other pedal complain \par

## 2020-01-16 NOTE — PHYSICAL EXAM
[General Appearance - Alert] : alert [General Appearance - In No Acute Distress] : in no acute distress [FreeTextEntry1] :   b/l venous stasis dermatitis, nail dystrophy x 10. R 4th toe corn

## 2020-03-19 ENCOUNTER — APPOINTMENT (OUTPATIENT)
Dept: PODIATRY | Facility: CLINIC | Age: 65
End: 2020-03-19

## 2020-07-13 ENCOUNTER — APPOINTMENT (OUTPATIENT)
Dept: PODIATRY | Facility: CLINIC | Age: 65
End: 2020-07-13
Payer: MEDICAID

## 2020-07-13 ENCOUNTER — OUTPATIENT (OUTPATIENT)
Dept: OUTPATIENT SERVICES | Facility: HOSPITAL | Age: 65
LOS: 1 days | Discharge: HOME | End: 2020-07-13

## 2020-07-13 VITALS
SYSTOLIC BLOOD PRESSURE: 121 MMHG | WEIGHT: 241 LBS | DIASTOLIC BLOOD PRESSURE: 77 MMHG | TEMPERATURE: 98.1 F | BODY MASS INDEX: 29.35 KG/M2 | HEART RATE: 80 BPM | HEIGHT: 76 IN

## 2020-07-13 PROCEDURE — 97597 DBRDMT OPN WND 1ST 20 CM/<: CPT | Mod: NC

## 2020-07-13 PROCEDURE — 11721 DEBRIDE NAIL 6 OR MORE: CPT | Mod: NC,59

## 2020-07-14 NOTE — PHYSICAL EXAM
[General Appearance - Alert] : alert [General Appearance - In No Acute Distress] : in no acute distress [FreeTextEntry1] :   b/l venous stasis dermatitis, nail dystrophy x 10. right hallux plantar tuft stage 2 ulcer. No signs of infection

## 2020-07-14 NOTE — ASSESSMENT
[FreeTextEntry1] : -excisional debridement of right hallux down to dermal tissue w/ 15 blade \par - Aseptic debridement of all fungal nails x 10. patient notes improvement with periodic nail debridement\par return 2 months\par -apply bacitracin and gauze dressing to right hallux\par -return 2 month\par \par \par

## 2020-07-14 NOTE — HISTORY OF PRESENT ILLNESS
[FreeTextEntry1] : 65 y/o male returns to clinic for elongated painful mycotic toenails x10. Developed a superficial ulcer on the right plantar hallux

## 2020-07-27 DIAGNOSIS — R26.2 DIFFICULTY IN WALKING, NOT ELSEWHERE CLASSIFIED: ICD-10-CM

## 2020-07-27 DIAGNOSIS — L89.892 PRESSURE ULCER OF OTHER SITE, STAGE 2: ICD-10-CM

## 2020-07-27 DIAGNOSIS — B35.1 TINEA UNGUIUM: ICD-10-CM

## 2020-09-14 ENCOUNTER — APPOINTMENT (OUTPATIENT)
Dept: PODIATRY | Facility: CLINIC | Age: 65
End: 2020-09-14
Payer: MEDICAID

## 2020-09-14 ENCOUNTER — OUTPATIENT (OUTPATIENT)
Dept: OUTPATIENT SERVICES | Facility: HOSPITAL | Age: 65
LOS: 1 days | Discharge: HOME | End: 2020-09-14

## 2020-09-14 DIAGNOSIS — L89.892 PRESSURE ULCER OF OTHER SITE, STAGE 2: ICD-10-CM

## 2020-09-14 DIAGNOSIS — R26.2 DIFFICULTY IN WALKING, NOT ELSEWHERE CLASSIFIED: ICD-10-CM

## 2020-09-14 DIAGNOSIS — B35.1 TINEA UNGUIUM: ICD-10-CM

## 2020-09-14 PROCEDURE — 11721 DEBRIDE NAIL 6 OR MORE: CPT | Mod: NC,59

## 2020-09-14 PROCEDURE — 97597 DBRDMT OPN WND 1ST 20 CM/<: CPT | Mod: NC

## 2020-09-14 NOTE — HISTORY OF PRESENT ILLNESS
[Walker] : a walker [FreeTextEntry1] : 64 y/o male returns to clinic for elongated painful mycotic toenails x10. recurrence of  superficial ulcer on the right plantar hallux

## 2020-09-14 NOTE — PHYSICAL EXAM
[Ankle Swelling (On Exam)] : present [Ankle Swelling Bilaterally] : bilaterally  [] : on both lower extremities [Pes Planus] : pes planus deformity [de-identified] : hyperkeratotic  [FreeTextEntry2] : 0.5cm x 0.5cm [FreeTextEntry1] : hallux plantar tuft  [de-identified] : down to dermal layer [TWNoteComboBox2] : 2 [TWNoteComboBox1] : Right [TWNoteComboBox5] : No [TWNoteComboBox4] : None [TWNoteComboBox6] : Pressure [de-identified] : other [de-identified] : None [de-identified] : 100% [TWNoteComboBox7] : Shirley [de-identified] : Debridement excisional

## 2020-09-14 NOTE — PROCEDURE
[FreeTextEntry1] : -Aseptic debridement of all fungal nails.  Patient has pain about the toes secondary to nail pressure and relates improvement with periodic debridement.\par -rx silvadene\par -return 2 month

## 2020-10-13 ENCOUNTER — INPATIENT (INPATIENT)
Facility: HOSPITAL | Age: 65
LOS: 1 days | Discharge: NURSING, REHAB OR LONG-TERM CARE | End: 2020-10-15
Attending: INTERNAL MEDICINE | Admitting: INTERNAL MEDICINE
Payer: MEDICAID

## 2020-10-13 VITALS
HEART RATE: 80 BPM | OXYGEN SATURATION: 98 % | RESPIRATION RATE: 18 BRPM | DIASTOLIC BLOOD PRESSURE: 69 MMHG | TEMPERATURE: 103 F | HEIGHT: 76 IN | SYSTOLIC BLOOD PRESSURE: 139 MMHG

## 2020-10-13 DIAGNOSIS — R09.89 OTHER SPECIFIED SYMPTOMS AND SIGNS INVOLVING THE CIRCULATORY AND RESPIRATORY SYSTEMS: ICD-10-CM

## 2020-10-13 LAB
ALBUMIN SERPL ELPH-MCNC: 4 G/DL — SIGNIFICANT CHANGE UP (ref 3.5–5.2)
ALP SERPL-CCNC: 54 U/L — SIGNIFICANT CHANGE UP (ref 30–115)
ALT FLD-CCNC: 9 U/L — SIGNIFICANT CHANGE UP (ref 0–41)
ANION GAP SERPL CALC-SCNC: 11 MMOL/L — SIGNIFICANT CHANGE UP (ref 7–14)
AST SERPL-CCNC: 14 U/L — SIGNIFICANT CHANGE UP (ref 0–41)
BASOPHILS # BLD AUTO: 0 K/UL — SIGNIFICANT CHANGE UP (ref 0–0.2)
BASOPHILS NFR BLD AUTO: 0 % — SIGNIFICANT CHANGE UP (ref 0–1)
BILIRUB SERPL-MCNC: 0.5 MG/DL — SIGNIFICANT CHANGE UP (ref 0.2–1.2)
BUN SERPL-MCNC: 10 MG/DL — SIGNIFICANT CHANGE UP (ref 10–20)
CALCIUM SERPL-MCNC: 8.8 MG/DL — SIGNIFICANT CHANGE UP (ref 8.5–10.1)
CHLORIDE SERPL-SCNC: 106 MMOL/L — SIGNIFICANT CHANGE UP (ref 98–110)
CO2 SERPL-SCNC: 22 MMOL/L — SIGNIFICANT CHANGE UP (ref 17–32)
CREAT SERPL-MCNC: 0.8 MG/DL — SIGNIFICANT CHANGE UP (ref 0.7–1.5)
EOSINOPHIL # BLD AUTO: 0 K/UL — SIGNIFICANT CHANGE UP (ref 0–0.7)
EOSINOPHIL NFR BLD AUTO: 0 % — SIGNIFICANT CHANGE UP (ref 0–8)
ERYTHROCYTE [SEDIMENTATION RATE] IN BLOOD: 47 MM/HR — HIGH (ref 0–10)
GLUCOSE SERPL-MCNC: 99 MG/DL — SIGNIFICANT CHANGE UP (ref 70–99)
HCT VFR BLD CALC: 40.6 % — LOW (ref 42–52)
HGB BLD-MCNC: 13.3 G/DL — LOW (ref 14–18)
LACTATE SERPL-SCNC: 2 MMOL/L — SIGNIFICANT CHANGE UP (ref 0.7–2)
LACTATE SERPL-SCNC: 2.4 MMOL/L — HIGH (ref 0.7–2)
LYMPHOCYTES # BLD AUTO: 0.14 K/UL — LOW (ref 1.2–3.4)
LYMPHOCYTES # BLD AUTO: 0.9 % — LOW (ref 20.5–51.1)
MANUAL SMEAR VERIFICATION: SIGNIFICANT CHANGE UP
MCHC RBC-ENTMCNC: 29.9 PG — SIGNIFICANT CHANGE UP (ref 27–31)
MCHC RBC-ENTMCNC: 32.8 G/DL — SIGNIFICANT CHANGE UP (ref 32–37)
MCV RBC AUTO: 91.2 FL — SIGNIFICANT CHANGE UP (ref 80–94)
MONOCYTES # BLD AUTO: 0.7 K/UL — HIGH (ref 0.1–0.6)
MONOCYTES NFR BLD AUTO: 4.4 % — SIGNIFICANT CHANGE UP (ref 1.7–9.3)
NEUTROPHILS # BLD AUTO: 15.11 K/UL — HIGH (ref 1.4–6.5)
NEUTROPHILS NFR BLD AUTO: 93 % — HIGH (ref 42.2–75.2)
NEUTS BAND # BLD: 1.7 % — SIGNIFICANT CHANGE UP (ref 0–6)
PLAT MORPH BLD: NORMAL — SIGNIFICANT CHANGE UP
PLATELET # BLD AUTO: 208 K/UL — SIGNIFICANT CHANGE UP (ref 130–400)
POTASSIUM SERPL-MCNC: 4 MMOL/L — SIGNIFICANT CHANGE UP (ref 3.5–5)
POTASSIUM SERPL-SCNC: 4 MMOL/L — SIGNIFICANT CHANGE UP (ref 3.5–5)
PROT SERPL-MCNC: 6.6 G/DL — SIGNIFICANT CHANGE UP (ref 6–8)
RBC # BLD: 4.45 M/UL — LOW (ref 4.7–6.1)
RBC # FLD: 13.7 % — SIGNIFICANT CHANGE UP (ref 11.5–14.5)
RBC BLD AUTO: NORMAL — SIGNIFICANT CHANGE UP
SARS-COV-2 RNA SPEC QL NAA+PROBE: SIGNIFICANT CHANGE UP
SODIUM SERPL-SCNC: 139 MMOL/L — SIGNIFICANT CHANGE UP (ref 135–146)
WBC # BLD: 15.96 K/UL — HIGH (ref 4.8–10.8)
WBC # FLD AUTO: 15.96 K/UL — HIGH (ref 4.8–10.8)

## 2020-10-13 PROCEDURE — 99497 ADVNCD CARE PLAN 30 MIN: CPT | Mod: 25

## 2020-10-13 PROCEDURE — 99223 1ST HOSP IP/OBS HIGH 75: CPT

## 2020-10-13 PROCEDURE — 73630 X-RAY EXAM OF FOOT: CPT | Mod: 26,RT

## 2020-10-13 PROCEDURE — 99285 EMERGENCY DEPT VISIT HI MDM: CPT

## 2020-10-13 RX ORDER — OMEGA-3 ACID ETHYL ESTERS 1 G
4 CAPSULE ORAL DAILY
Refills: 0 | Status: DISCONTINUED | OUTPATIENT
Start: 2020-10-13 | End: 2020-10-15

## 2020-10-13 RX ORDER — SODIUM CHLORIDE 9 MG/ML
1000 INJECTION, SOLUTION INTRAVENOUS ONCE
Refills: 0 | Status: COMPLETED | OUTPATIENT
Start: 2020-10-13 | End: 2020-10-13

## 2020-10-13 RX ORDER — ACETAMINOPHEN 500 MG
975 TABLET ORAL ONCE
Refills: 0 | Status: COMPLETED | OUTPATIENT
Start: 2020-10-13 | End: 2020-10-13

## 2020-10-13 RX ORDER — TAMSULOSIN HYDROCHLORIDE 0.4 MG/1
0.4 CAPSULE ORAL AT BEDTIME
Refills: 0 | Status: DISCONTINUED | OUTPATIENT
Start: 2020-10-13 | End: 2020-10-15

## 2020-10-13 RX ORDER — VANCOMYCIN HCL 1 G
1000 VIAL (EA) INTRAVENOUS ONCE
Refills: 0 | Status: COMPLETED | OUTPATIENT
Start: 2020-10-13 | End: 2020-10-13

## 2020-10-13 RX ORDER — ACETAMINOPHEN 500 MG
650 TABLET ORAL ONCE
Refills: 0 | Status: COMPLETED | OUTPATIENT
Start: 2020-10-13 | End: 2020-10-13

## 2020-10-13 RX ORDER — ASPIRIN/CALCIUM CARB/MAGNESIUM 324 MG
81 TABLET ORAL DAILY
Refills: 0 | Status: DISCONTINUED | OUTPATIENT
Start: 2020-10-13 | End: 2020-10-15

## 2020-10-13 RX ORDER — ENOXAPARIN SODIUM 100 MG/ML
40 INJECTION SUBCUTANEOUS DAILY
Refills: 0 | Status: DISCONTINUED | OUTPATIENT
Start: 2020-10-13 | End: 2020-10-15

## 2020-10-13 RX ADMIN — ENOXAPARIN SODIUM 40 MILLIGRAM(S): 100 INJECTION SUBCUTANEOUS at 16:29

## 2020-10-13 RX ADMIN — TAMSULOSIN HYDROCHLORIDE 0.4 MILLIGRAM(S): 0.4 CAPSULE ORAL at 23:19

## 2020-10-13 RX ADMIN — Medication 1000 MILLIGRAM(S): at 13:05

## 2020-10-13 RX ADMIN — Medication 975 MILLIGRAM(S): at 12:00

## 2020-10-13 RX ADMIN — SODIUM CHLORIDE 1000 MILLILITER(S): 9 INJECTION, SOLUTION INTRAVENOUS at 11:57

## 2020-10-13 RX ADMIN — SODIUM CHLORIDE 1000 MILLILITER(S): 9 INJECTION, SOLUTION INTRAVENOUS at 13:05

## 2020-10-13 RX ADMIN — Medication 250 MILLIGRAM(S): at 11:57

## 2020-10-13 RX ADMIN — Medication 975 MILLIGRAM(S): at 11:56

## 2020-10-13 RX ADMIN — SODIUM CHLORIDE 1000 MILLILITER(S): 9 INJECTION, SOLUTION INTRAVENOUS at 12:26

## 2020-10-13 RX ADMIN — Medication 81 MILLIGRAM(S): at 16:29

## 2020-10-13 RX ADMIN — Medication 650 MILLIGRAM(S): at 23:49

## 2020-10-13 NOTE — ED PROVIDER NOTE - PROGRESS NOTE DETAILS
Dienes- podiatry consulted. Patient with elevated WBC and fever, treating for code sepsis. Given abx, additional fluids ordered. Hemodynamically stable. Celestino- signed out to MAR. Patient hemodynamically stable. Attending Note: I personally evaluated the patient. I reviewed the Resident’s note (as assigned above), and agree with the findings and plan except as documented in my note.   66 y/o M PMHx stroke and cellulitis p/w cellulitis to R foot. Pt presents from assisted living facility. Notes this morning started developing pain, redness, and warmth to R foot. States he has had previous cellulitis to that foot and has similar sx at this time. Pt’s temperature this morning was 99. In ED found to febrile, tmax 102.  PE: CONSTITUTIONAL: Well-developed; well-nourished; in no acute distress. SKIN: warm, dry. HEAD: Normocephalic; atraumatic. EYES: PERRL, EOMI, no conjunctival erythema. ENT: No nasal discharge; airway clear. NECK: Supple; non tender. CARD: S1, S2 normal. Regular rate and rhythm.  RESP: No wheezes, rales or rhonchi. ABD: soft non tender, non distended, no rebound or guarding. EXT: Positive warmth, tenderness, and edema over R foot and ankle. No open wounds. Neurovasculalry intact. No drainage. LYMPH: No acute cervical adenopathy. NEURO: responsive to painful stimuli, no focal deficits. PSYCH: Cooperative, appropriate. Plan: Labs, XR, and ABX. Dienes- podiatry consulted. Patient with elevated WBC and fever, treating for code sepsis. Given abx, additional fluids ordered. IBW was utilized for fluid administration. Hemodynamically stable.

## 2020-10-13 NOTE — ED PROVIDER NOTE - OBJECTIVE STATEMENT
66yo M with PMH of Charcot's R foot, HTN, HLD, BPH, PVD (vascular Dr. Jones), and multiple bouts of RLE cellulitis presenting to ED with R foot/RLE redness/swelling that started this AM. Patient states he was on PO abx outpatient for cellulitis infections July and August of this year, followed by podiatry (Shannan). Denies any injuries/traumas/falls, CP, SOB, known fevers/chills, abdominal pain, paresthesias, or n/v/d. Former smoker.

## 2020-10-13 NOTE — H&P ADULT - NSHPPHYSICALEXAM_GEN_ALL_CORE
GEN: no acute distress, Resting comfortably in bed  PULM: Clear to auscultation bilaterally, No wheezes  CVS: faint heart sounds with regular rate and rhythm, S1-S2, no murmurs  ABD: Soft, non-tender, non-distended, no guarding, BS present all 4 quadrants  EXT:   -Right foot erythema/warmth/tenderness at R medial malleolus extending to pre-tibial area  -Non-purulent cyst of R lateral abdominal area; non-draining.   NEURO: A&Ox3, decreased sensation of bilateral plantar surface of feet, motor strength equal/appropriate in bilateral UE/LE, CN's grossly intact

## 2020-10-13 NOTE — CONSULT NOTE ADULT - SUBJECTIVE AND OBJECTIVE BOX
Podiatry Consult Note    Subjective:  RO LEMUS is a pleasant well-nourished, well developed 65y Male in no acute distress, alert awake, and oriented to person, place and time.   Patient is a 65y old  Male who presents with a chief complaint of Cellulitis (13 Oct 2020 14:24)  Patient was seen bedside in the ED; Patient says hes had this wound for months; but due to living in a home he is unable to visit a Podiatrist as often as he likes;  Patient says since hes gotten IV ABx; Swelling and Redness from his Right Leg is Greatly Improved;     HPI:  66 y/o male with Past medical history of HTN, DLD, BPH, PVD s/p ballon angioplasty presents to the ED with increasing pain due to cellulitis of his right foot.     Patient is presenting from [facility]. This morning he began to experience increased pain, redness, and warmth of his right foot. Patient states that he had a similar episode of cellulitis of his R foot 2 years ago when his right foot was 3 times the size. As per chart review, he was admitted for 3 days in January 2019 for RLE cellulitis and given clindamycin 600 mg q8 during hospital stay and discharged on doxy 100 q8. He also states that he has had infections of his toes in both June and August of this year for which he was treated with an unknown antibiotic. As per chart review, pt’s temperature this morning was 99 and 102 in the ED warranting administration of 1g Vancomycin.     In ED found to febrile, tmax 102, and was given 2L of LR, and 1 gm of vancomycin IV. Patient states that his foot pain has now decreased.     ROS (-) for chest pain, SOB, nausea/vomiting, diarrhea, headache, chills, dizziness, loss of consciousness, dysuria, weight loss, abdominal pain (13 Oct 2020 14:24)    Past Medical History and Surgical History  Cellulitis  Charcot ankle, right  Hypercholesteremia  Enlarged prostate  BPH  HTN (hypertension)    Objective:  Vital Signs Last 24 Hrs  T(C): 38.4 (13 Oct 2020 12:28), Max: 39.2 (13 Oct 2020 11:10)  T(F): 101.2 (13 Oct 2020 12:28), Max: 102.6 (13 Oct 2020 11:10)  HR: 93 (13 Oct 2020 12:28) (80 - 93)  BP: 127/59 (13 Oct 2020 12:28) (127/59 - 139/69)  BP(mean): --  RR: 17 (13 Oct 2020 12:28) (17 - 18)  SpO2: 97% (13 Oct 2020 12:28) (97% - 98%)                        13.3   15.96 )-----------( 208      ( 13 Oct 2020 11:40 )             40.6                 10-13    139  |  106  |  10  ----------------------------<  99  4.0   |  22  |  0.8    Ca    8.8      13 Oct 2020 11:40    TPro  6.6  /  Alb  4.0  /  TBili  0.5  /  DBili  x   /  AST  14  /  ALT  9   /  AlkPhos  54  10-13  __________________________________________________________________________  EXAM:  XR FOOT COMP MIN 3 VIEWS RT        PROCEDURE DATE:  10/13/2020      INTERPRETATION:  Clinical History / Reason for exam: Foot cellulitis. Foot pain.    Technique:  Three  radiographs of the right foot are obtained.    Comparison: Right foot x-ray January 1, 2019.    FINDINGS/  IMPRESSION:  No radiographic evidence of acute fracture or dislocation.  Lisfranc joint is intact.  Soft tissue swelling, most notably over dorsum of foot, without radiographic evidence of subcutaneous emphysema or acute osteomyelitis.  Soft tissue defect of right first digit. Unchanged subungual osteophyte at the hallux distal phalanx, which may represent sequela of chronic osteomyelitis.  Degenerative change of midfoot and interphalangeal joints. Osteopenia.  Chronic Achilles tendinosis, with enthesopathic changes at the calcaneus.    POLO MARIE M.D., ATTENDING RADIOLOGIST  This document has been electronically signed. Oct 13 2020  1:50PM  __________________________________________________________________________    Physical Exam - Lower Extremity Focused:   Derm:   Open Ulcer on the Distal Plantar Medial Aspect of Hallux; Right Foot;  Does Not Probe to Bone   No Undermining  Hyperkeratotic Periwound; w/o Drainage or Active Bleeding Noted;     Vascular: Unable to Assess due to Swelling;   Neuro: Protective Sensation Diminished    Assessment:  Cellulitic RLE;   Open Ulcer; Right Hallux;     Plan:  Chart reviewed and Patient evaluated. All Questions and Concerns Addressed and Answered  Discussed diagnosis and treatment with patient  XR Reviewed; See Report Above;   Local Wound Care; Flush w/ Normal Saline; Betadine / DSD / Kerlix;   Continue w/ IV ABx; Follow Up w/ ID;   Continue w/ Local Wound Care; Q24 Dressing Changes;  Will Continue to Monitor Patient While Admitted;   Discussed Plan w/ Dr. Henderson;     Podiatry

## 2020-10-13 NOTE — GOALS OF CARE CONVERSATION - ADVANCED CARE PLANNING - CONVERSATION DETAILS
reviewed with patient advance directives, patient is electing to be FULL CODE   MOLST form filled out reflecting this

## 2020-10-13 NOTE — H&P ADULT - HISTORY OF PRESENT ILLNESS
64 y/o male with Past medical history of HTN, DLD, BPH, PVD s/p ballon angioplasty presents to the ED with increasing pain due to cellulitis of his right foot.     Patient is presenting from [facility]. This morning he began to experience increased pain, redness, and warmth of his right foot. Patient states that he had a similar episode of cellulitis of his R foot 2 years ago when his right foot was 3 times the size. As per chart review, he was admitted for 3 days in January 2019 for RLE cellulitis and given He also states that he has had infections of his toes in both June and August of this year for which he was treated with an unknown antibiotic. As per chart review (no documentation thus far), pt’s temperature this morning was 99.     In ED found to febrile, tmax 102, and was given 2L of LR, and 1 gm of vancomycin IV. Patient states that his foot pain has now decreased.     ROS (-) for chest pain, SOB, nausea/vomiting, diarrhea, headache, chills, dizziness, loss of consciousness, dysuria, weight loss, abdominal pain 66 y/o male with Past medical history of HTN, DLD, BPH, PVD s/p ballon angioplasty presents to the ED with increasing pain due to cellulitis of his right foot.     Patient is presenting from [facility]. This morning he began to experience increased pain, redness, and warmth of his right foot. Patient states that he had a similar episode of cellulitis of his R foot 2 years ago when his right foot was 3 times the size. As per chart review, he was admitted for 3 days in January 2019 for RLE cellulitis and given clindamycin 600 mg q8 during hospital stay and discharged on doxy 100 q8. He also states that he has had infections of his toes in both June and August of this year for which he was treated with an unknown antibiotic. As per chart review, pt’s temperature this morning was 99 and 102 in the ED warranting administration of 1g Vancomycin.     In ED found to febrile, tmax 102, and was given 2L of LR, and 1 gm of vancomycin IV. Patient states that his foot pain has now decreased.     ROS (-) for chest pain, SOB, nausea/vomiting, diarrhea, headache, chills, dizziness, loss of consciousness, dysuria, weight loss, abdominal pain

## 2020-10-13 NOTE — ED PROVIDER NOTE - CARE PLAN
Principal Discharge DX:	Cellulitis of lower extremity  Secondary Diagnosis:	Sepsis  Secondary Diagnosis:	Fever  Secondary Diagnosis:	Leukocytosis

## 2020-10-13 NOTE — PATIENT PROFILE ADULT - ARRIVAL FROM
Pt came in to clinic today for a nurse visit to have her BP checked. She said h=she has been checking it at home and it still has been running high. First automatic reading was 144/82. Second reading was done manually and it was 150/84  
Assisted living facility

## 2020-10-13 NOTE — H&P ADULT - NSHPLABSRESULTS_GEN_ALL_CORE
LABS                        13.3   15.96 )-----------( 208      ( 13 Oct 2020 11:40 )             40.6     10-13    139  |  106  |  10  ----------------------------<  99  4.0   |  22  |  0.8    Ca    8.8      13 Oct 2020 11:40    TPro  6.6  /  Alb  4.0  /  TBili  0.5  /  DBili  x   /  AST  14  /  ALT  9   /  AlkPhos  54  10-13      Lactate, Blood: 2.0 mmol/L (10-13-20 @ 13:23)  Lactate, Blood: 2.4 mmol/L <H> (10-13-20 @ 11:40)  Sedimentation Rate, Erythrocyte: 47 mm/Hr <H> (10-13-20 @ 11:40)      XRAY FOOT RIGHT, 10/13  - No radiographic evidence of acute fracture or dislocation.  Lisfranc joint is intact.  - Soft tissue swelling, most notably over dorsum of foot, without radiographic evidence of subcutaneous emphysema or acute osteomyelitis.  - Soft tissue defect of right first digit. Unchanged subungual osteophyte at the hallux distal phalanx, which may represent sequela of chronic osteomyelitis.  - Degenerative change of midfoot and interphalangeal joints. Osteopenia.  - Chronic Achilles tendinosis, with enthesopathic changes at the calcaneus.

## 2020-10-13 NOTE — CONSULT NOTE ADULT - PROVIDER SPECIALTY LIST ADULT
Podiatry Type 2 diabetes mellitus with hypoglycemia without coma, without long-term current use of insulin

## 2020-10-13 NOTE — ED ADULT NURSE NOTE - NSIMPLEMENTINTERV_GEN_ALL_ED
Implemented All Fall with Harm Risk Interventions:  [place] to call system. Call bell, personal items and telephone within reach. Instruct patient to call for assistance. Room bathroom lighting operational. Non-slip footwear when patient is off stretcher. Physically safe environment: no spills, clutter or unnecessary equipment. Stretcher in lowest position, wheels locked, appropriate side rails in place. Provide visual cue, wrist band, yellow gown, etc. Monitor gait and stability. Monitor for mental status changes and reorient to person, place, and time. Review medications for side effects contributing to fall risk. Reinforce activity limits and safety measures with patient and family. Provide visual clues: red socks.

## 2020-10-13 NOTE — ED PROVIDER NOTE - CLINICAL SUMMARY MEDICAL DECISION MAKING FREE TEXT BOX
Patient presents with right foot infection. Exam notable for cellulitis. labs, xray done. antibiotics given. Podietry consulted. Patient admitted for further management.

## 2020-10-13 NOTE — ED ADULT NURSE NOTE - PMH
Cellulitis    Charcot ankle, right    Enlarged prostate  BPH  HTN (hypertension)    Hypercholesteremia

## 2020-10-13 NOTE — PATIENT PROFILE ADULT - HAS THE PATIENT RECEIVED THE INFLUENZA VACCINE THIS SEASON?
Biopsy Type: H and E Wound Care: Vaseline Anesthesia Type: 1% lidocaine without epinephrine Size Of Lesion In Cm: 0 Silver Nitrate Text: The wound bed was treated with silver nitrate after the biopsy was performed. Destruction After The Procedure: No Detail Level: Detailed Biopsy Method: Dermablade Dressing: bandage Notification Instructions: Patient will be notified of biopsy results. However, patient instructed to call the office if not contacted within 2 weeks. Cryotherapy Text: The wound bed was treated with cryotherapy after the biopsy was performed. Anesthesia Volume In Cc: 0.5 Depth Of Biopsy: dermis yes... Billing Type: Third-Party Bill Electrodesiccation Text: The wound bed was treated with electrodesiccation after the biopsy was performed. Render Post-Care Instructions In Note?: yes Electrodesiccation And Curettage Text: The wound bed was treated with electrodesiccation and curettage after the biopsy was performed. Curettage Text: The wound bed was treated with curettage after the biopsy was performed. Type Of Destruction Used: Curettage Hemostasis: Cadence's Post-Care Instructions: I reviewed with the patient in detail post-care instructions. Patient is to keep the biopsy site dry overnight, and then apply bacitracin twice daily until healed. Patient may apply hydrogen peroxide soaks to remove any crusting. Consent: Written consent was obtained and risks were reviewed including but not limited to scarring, infection, bleeding, scabbing, incomplete removal, nerve damage and allergy to anesthesia.

## 2020-10-13 NOTE — H&P ADULT - ASSESSMENT
ASSESSMENT  64 y/o M with PMhx of stroke and cellulitis presenting from assisted living center with increasing pain/swelling of RLE cellulitis s/p 1g vancomycin and 2L LR pending ID/Podiatry recommendations, arterial/venous duplex of LE, in no acute distress improving after antibiotics and IVF.     PLAN    # RLE cellulitis  - Erythematous/tender/warm at R medial malleolus with associated edema until mid-tibial area  - WBC 16 on admission; trend WBC  - Fever 102 on admission; trend fever  - Lactate 2.4 on admission; trend lactate  - X-ray foot: not suggestive of acute osteomyelitis   - s/p 1g Vancomycin IV in ED  - s/p 2L LR  - f/u podiatry reccs  - f/u ID reccs for antibiotics; as per last admission in 1/2019 patient was given Clindamycin 600 mg q8 and discharged on Doxycycline 100 mg q12  - f/u venous/arterial duplex; consult Vascular if there is arterial dx     # BPH  - c/w flomax    # HTN  - reported in chart review  - not on any medications, f/u BP    # PVD  - c/w aspirin  DVT ppx: Heparin subQ  GI ppx: none, tolerating PO diet  Diet: Regular  Activity: Increase as tolerated  Disposition: d/c to [facility] upon absence of fever, resolving WBC, improvement in cellulitis  CODE status: FULL CODE    ASSESSMENT  66 y/o M with PMhx of stroke and cellulitis presenting from assisted living center with increasing pain/swelling of RLE cellulitis s/p 1g vancomycin and 2L LR pending ID/Podiatry recommendations, arterial/venous duplex of LE, in no acute distress improving after antibiotics and IVF.     PLAN    # RLE cellulitis  - Erythematous/tender/warm at R medial malleolus with associated edema until mid-tibial area  - WBC 16 on admission; trend WBC  - Fever 102 on admission; trend fever  - Lactate 2.4 on admission; trend lactate  - X-ray foot: not suggestive of acute osteomyelitis   - s/p 1g Vancomycin IV in ED  - s/p 2L LR  - Patient has hives/rash allergy to augmentin as recent as this year.   - Clindamycin 600 q8 ordered to start 10/14; d/c if ID recommends  - f/u ID reccs for antibiotics; as per last admission in 1/2019 patient was given Clindamycin 600 mg q8 and discharged on Doxycycline 100 mg q12; d/t penicillin allergy  - f/u podiatry reccs  - f/u venous/arterial duplex; consult Vascular if there is arterial dx     # BPH  - c/w flomax    # HTN  - reported in chart review  - not on any medications, f/u BP    # PVD  - c/w aspirin    DVT ppx: Heparin subQ  GI ppx: none, tolerating PO diet  Diet: Regular  Activity: Increase as tolerated  [facility] upon absence of fever, resolving WBC, improvement in cellulitis  CODE status: FULL CODE

## 2020-10-13 NOTE — ED PROVIDER NOTE - NS ED ROS FT
Constitutional:  No chills.  Eyes:  No visual changes, eye pain, or discharge.  ENT:  No sore throat.  Neck:  No neck pain or stiffness.  Cardiac:  No CP or edema.  Resp:  No cough or SOB.  GI:  No nausea, vomiting, diarrhea, or abdominal pain.  :  No dysuria, frequency, or hematuria.  MSK:  +RLE pain/swelling/redness.  Neuro:  No headache, dizziness, or weakness.  Skin:  No bullae/vesicles.

## 2020-10-13 NOTE — ED PROVIDER NOTE - PHYSICAL EXAMINATION
PHYSICAL EXAM: I have reviewed current vital signs.  GENERAL: NAD, well-nourished; well-developed.  HEAD:  Normocephalic, atraumatic.  EYES: Conjunctiva and sclera clear.  ENT: MMM.  NECK: Supple, FROM.  CHEST/LUNG: Clear to auscultation bilaterally; no wheezes, rales, or rhonchi.  HEART: Regular rate and rhythm, normal S1 and S2; no murmurs, rubs, or gallops.  ABDOMEN: Soft, nontender, nondistended.  EXTREMITIES:  2+ peripheral pulses; +RLE- redness/swelling/warmth to distal R leg, R ankle, and R foot. Small superficial wound to plantar R great toe. No sloughing/bullae/vesicles.  NEUROLOGY: A&O x 3. Motor 5/5. No focal neurological deficits.   SKIN: Warm and dry.

## 2020-10-13 NOTE — H&P ADULT - ATTENDING COMMENTS
patient seen and examined , agree with pgy 3 assesment and plan except as indicated above,   GEN Lying in no acute distress  HEENT Pupils equal and reactive to light and accommodationSupple Neck  PULM Clear to auscultation bilaterally  CV s1s2 regular rate and rhythm  GI + bowel sounds nontnender  EXT RLE erythema with non pitting edema extending distally from ankle , left lower extremity +1 pitting edema   PSYCH awake alert and oriented x 3  INTEG No Lesions  NEURO Moves all extremities  #Sepsis poa secondary to right lower extremity cellulitis ,   podiatry following   repeat lactate   clindamycin   id eval   arterial duplex  blood cultures  #Obesity Class 1 patient needs to see dieitian outpatient for further evaluation   Progress Note Handoff    Pending:  pod follow , id eval , clinical improvement  Family discussion: patient verbalized understanding and agreeable to plan of care     Disposition: [facility]

## 2020-10-14 LAB
ALBUMIN SERPL ELPH-MCNC: 3.3 G/DL — LOW (ref 3.5–5.2)
ALP SERPL-CCNC: 51 U/L — SIGNIFICANT CHANGE UP (ref 30–115)
ALT FLD-CCNC: 9 U/L — SIGNIFICANT CHANGE UP (ref 0–41)
ANION GAP SERPL CALC-SCNC: 7 MMOL/L — SIGNIFICANT CHANGE UP (ref 7–14)
AST SERPL-CCNC: 13 U/L — SIGNIFICANT CHANGE UP (ref 0–41)
BASOPHILS # BLD AUTO: 0.03 K/UL — SIGNIFICANT CHANGE UP (ref 0–0.2)
BASOPHILS NFR BLD AUTO: 0.3 % — SIGNIFICANT CHANGE UP (ref 0–1)
BILIRUB SERPL-MCNC: 0.8 MG/DL — SIGNIFICANT CHANGE UP (ref 0.2–1.2)
BUN SERPL-MCNC: 8 MG/DL — LOW (ref 10–20)
CALCIUM SERPL-MCNC: 8.4 MG/DL — LOW (ref 8.5–10.1)
CHLORIDE SERPL-SCNC: 105 MMOL/L — SIGNIFICANT CHANGE UP (ref 98–110)
CO2 SERPL-SCNC: 25 MMOL/L — SIGNIFICANT CHANGE UP (ref 17–32)
CREAT SERPL-MCNC: 0.7 MG/DL — SIGNIFICANT CHANGE UP (ref 0.7–1.5)
CRP SERPL-MCNC: 2.47 MG/DL — HIGH (ref 0–0.4)
EOSINOPHIL # BLD AUTO: 0.07 K/UL — SIGNIFICANT CHANGE UP (ref 0–0.7)
EOSINOPHIL NFR BLD AUTO: 0.6 % — SIGNIFICANT CHANGE UP (ref 0–8)
GLUCOSE SERPL-MCNC: 102 MG/DL — HIGH (ref 70–99)
HCT VFR BLD CALC: 37.6 % — LOW (ref 42–52)
HCV AB S/CO SERPL IA: 0.04 COI — SIGNIFICANT CHANGE UP
HCV AB SERPL-IMP: SIGNIFICANT CHANGE UP
HGB BLD-MCNC: 12.2 G/DL — LOW (ref 14–18)
IMM GRANULOCYTES NFR BLD AUTO: 0.3 % — SIGNIFICANT CHANGE UP (ref 0.1–0.3)
LACTATE SERPL-SCNC: 0.9 MMOL/L — SIGNIFICANT CHANGE UP (ref 0.7–2)
LYMPHOCYTES # BLD AUTO: 1.14 K/UL — LOW (ref 1.2–3.4)
LYMPHOCYTES # BLD AUTO: 9.9 % — LOW (ref 20.5–51.1)
MAGNESIUM SERPL-MCNC: 2.1 MG/DL — SIGNIFICANT CHANGE UP (ref 1.8–2.4)
MCHC RBC-ENTMCNC: 30 PG — SIGNIFICANT CHANGE UP (ref 27–31)
MCHC RBC-ENTMCNC: 32.4 G/DL — SIGNIFICANT CHANGE UP (ref 32–37)
MCV RBC AUTO: 92.4 FL — SIGNIFICANT CHANGE UP (ref 80–94)
MONOCYTES # BLD AUTO: 0.97 K/UL — HIGH (ref 0.1–0.6)
MONOCYTES NFR BLD AUTO: 8.4 % — SIGNIFICANT CHANGE UP (ref 1.7–9.3)
NEUTROPHILS # BLD AUTO: 9.31 K/UL — HIGH (ref 1.4–6.5)
NEUTROPHILS NFR BLD AUTO: 80.5 % — HIGH (ref 42.2–75.2)
NRBC # BLD: 0 /100 WBCS — SIGNIFICANT CHANGE UP (ref 0–0)
PHOSPHATE SERPL-MCNC: 3.7 MG/DL — SIGNIFICANT CHANGE UP (ref 2.1–4.9)
PLATELET # BLD AUTO: 178 K/UL — SIGNIFICANT CHANGE UP (ref 130–400)
POTASSIUM SERPL-MCNC: 3.5 MMOL/L — SIGNIFICANT CHANGE UP (ref 3.5–5)
POTASSIUM SERPL-SCNC: 3.5 MMOL/L — SIGNIFICANT CHANGE UP (ref 3.5–5)
PROT SERPL-MCNC: 5.7 G/DL — LOW (ref 6–8)
RBC # BLD: 4.07 M/UL — LOW (ref 4.7–6.1)
RBC # FLD: 13.8 % — SIGNIFICANT CHANGE UP (ref 11.5–14.5)
SARS-COV-2 IGG SERPL QL IA: POSITIVE
SARS-COV-2 IGM SERPL IA-ACNC: 15.2 INDEX — HIGH
SODIUM SERPL-SCNC: 137 MMOL/L — SIGNIFICANT CHANGE UP (ref 135–146)
WBC # BLD: 11.56 K/UL — HIGH (ref 4.8–10.8)
WBC # FLD AUTO: 11.56 K/UL — HIGH (ref 4.8–10.8)

## 2020-10-14 PROCEDURE — 93925 LOWER EXTREMITY STUDY: CPT | Mod: 26

## 2020-10-14 PROCEDURE — 93970 EXTREMITY STUDY: CPT | Mod: 26

## 2020-10-14 PROCEDURE — 99233 SBSQ HOSP IP/OBS HIGH 50: CPT

## 2020-10-14 PROCEDURE — 99221 1ST HOSP IP/OBS SF/LOW 40: CPT

## 2020-10-14 RX ORDER — CEFAZOLIN SODIUM 1 G
2000 VIAL (EA) INJECTION ONCE
Refills: 0 | Status: DISCONTINUED | OUTPATIENT
Start: 2020-10-14 | End: 2020-10-14

## 2020-10-14 RX ORDER — CEFAZOLIN SODIUM 1 G
2000 VIAL (EA) INJECTION EVERY 8 HOURS
Refills: 0 | Status: DISCONTINUED | OUTPATIENT
Start: 2020-10-14 | End: 2020-10-15

## 2020-10-14 RX ADMIN — Medication 100 MILLIGRAM(S): at 21:26

## 2020-10-14 RX ADMIN — Medication 100 MILLIGRAM(S): at 05:10

## 2020-10-14 RX ADMIN — TAMSULOSIN HYDROCHLORIDE 0.4 MILLIGRAM(S): 0.4 CAPSULE ORAL at 21:26

## 2020-10-14 RX ADMIN — ENOXAPARIN SODIUM 40 MILLIGRAM(S): 100 INJECTION SUBCUTANEOUS at 13:23

## 2020-10-14 RX ADMIN — Medication 4 GRAM(S): at 13:23

## 2020-10-14 RX ADMIN — Medication 650 MILLIGRAM(S): at 00:19

## 2020-10-14 RX ADMIN — Medication 100 MILLIGRAM(S): at 13:22

## 2020-10-14 RX ADMIN — Medication 81 MILLIGRAM(S): at 13:23

## 2020-10-14 NOTE — PROGRESS NOTE ADULT - ATTENDING COMMENTS
right hallux distal ulcer-->superficial; unlikely source of infection   xrays reviewed: distal hallux osteophyte vs chronic OM  Clinically, ulceration appears stable w/ out signs of infection. No further imaging needed  No surgical intervention at this time  rest of care as per primary

## 2020-10-14 NOTE — CONSULT NOTE ADULT - SUBJECTIVE AND OBJECTIVE BOX
RO LEMUS  65y, Male  Allergy: Augmentin (Rash; Diarrhea; Hives)  penicillin (Unknown)  Vitamin D (Hives)      All historical available data reviewed.    HPI:  66 y/o male with Past medical history of HTN, DLD, BPH, PVD s/p ballon angioplasty presents to the ED with increasing pain due to cellulitis of his right foot.     Patient is presenting from [facility]. This morning he began to experience increased pain, redness, and warmth of his right foot. Patient states that he had a similar episode of cellulitis of his R foot 2 years ago when his right foot was 3 times the size. As per chart review, he was admitted for 3 days in January 2019 for RLE cellulitis and given clindamycin 600 mg q8 during hospital stay and discharged on doxy 100 q8. He also states that he has had infections of his toes in both June and August of this year for which he was treated with an unknown antibiotic. As per chart review, pt’s temperature this morning was 99 and 102 in the ED warranting administration of 1g Vancomycin.     In ED found to febrile, tmax 102, and was given 2L of LR, and 1 gm of vancomycin IV. Patient states that his foot pain has now decreased.     ID called for cellulitis    ROS (-) for chest pain, SOB, nausea/vomiting, diarrhea, headache, chills, dizziness, loss of consciousness, dysuria, weight loss, abdominal pain (13 Oct 2020 14:24)    FAMILY HISTORY:    PAST MEDICAL & SURGICAL HISTORY:  Cellulitis    Charcot ankle, right    Hypercholesteremia    Enlarged prostate  BPH    HTN (hypertension)          VITALS:  T(F): 100.6, Max: 102.6 (10-13-20 @ 11:10)  HR: 88  BP: 151/66  RR: 18Vital Signs Last 24 Hrs  T(C): 38.1 (14 Oct 2020 00:24), Max: 39.2 (13 Oct 2020 11:10)  T(F): 100.6 (14 Oct 2020 00:24), Max: 102.6 (13 Oct 2020 11:10)  HR: 88 (14 Oct 2020 00:24) (80 - 93)  BP: 151/66 (14 Oct 2020 00:24) (108/64 - 151/66)  BP(mean): --  RR: 18 (14 Oct 2020 00:24) (17 - 18)  SpO2: 97% (14 Oct 2020 00:24) (97% - 98%)    TESTS & MEASUREMENTS:                        13.3   15.96 )-----------( 208      ( 13 Oct 2020 11:40 )             40.6     10-13    139  |  106  |  10  ----------------------------<  99  4.0   |  22  |  0.8    Ca    8.8      13 Oct 2020 11:40    TPro  6.6  /  Alb  4.0  /  TBili  0.5  /  DBili  x   /  AST  14  /  ALT  9   /  AlkPhos  54  10-13    LIVER FUNCTIONS - ( 13 Oct 2020 11:40 )  Alb: 4.0 g/dL / Pro: 6.6 g/dL / ALK PHOS: 54 U/L / ALT: 9 U/L / AST: 14 U/L / GGT: x                   RADIOLOGY & ADDITIONAL TESTS:  Personal review of radiological diagnostics performed  Echo and EKG results noted when applicable.     MEDICATIONS:  aspirin enteric coated 81 milliGRAM(s) Oral daily  clindamycin IVPB 600 milliGRAM(s) IV Intermittent every 8 hours  enoxaparin Injectable 40 milliGRAM(s) SubCutaneous daily  omega-3-Acid Ethyl Esters 4 Gram(s) Oral daily  tamsulosin 0.4 milliGRAM(s) Oral at bedtime      ANTIBIOTICS:  clindamycin IVPB 600 milliGRAM(s) IV Intermittent every 8 hours

## 2020-10-14 NOTE — PROGRESS NOTE ADULT - SUBJECTIVE AND OBJECTIVE BOX
SUBJECTIVE:    Patient is a 65y old Male who presents with a chief complaint of Cellulitis (14 Oct 2020 07:45)    Currently admitted to medicine with the primary diagnosis of Cellulitis of lower extremity    Today is hospital day 1d. This morning he is resting  in bed and reports no overnight events. pt reports improved swelling and pain since being admitted here.  Pt had fever of 38.1C at midnight, received Tylenol with good response. Currently denied chills, fever.     PAST MEDICAL & SURGICAL HISTORY  Cellulitis    Charcot ankle, right    Hypercholesteremia    Enlarged prostate  BPH    HTN (hypertension)    ALLERGIES:  Augmentin (Rash; Diarrhea; Hives)  penicillin (Unknown)  Vitamin D (Hives)    MEDICATIONS:  STANDING MEDICATIONS  aspirin enteric coated 81 milliGRAM(s) Oral daily  ceFAZolin   IVPB 2000 milliGRAM(s) IV Intermittent every 8 hours  enoxaparin Injectable 40 milliGRAM(s) SubCutaneous daily  omega-3-Acid Ethyl Esters 4 Gram(s) Oral daily  tamsulosin 0.4 milliGRAM(s) Oral at bedtime    PRN MEDICATIONS    VITALS:   T(F): 98.5  HR: 85  BP: 136/67  RR: 18  SpO2: 97%    LABS:                        12.2   11.56 )-----------( 178      ( 14 Oct 2020 05:53 )             37.6     10-14    137  |  105  |  8<L>  ----------------------------<  102<H>  3.5   |  25  |  0.7    Ca    8.4<L>      14 Oct 2020 05:53  Phos  3.7     10-14  Mg     2.1     10-14    TPro  5.7<L>  /  Alb  3.3<L>  /  TBili  0.8  /  DBili  x   /  AST  13  /  ALT  9   /  AlkPhos  51  10-14          Lactate, Blood: 0.9 mmol/L (10-14-20 @ 05:53)  Lactate, Blood: 2.0 mmol/L (10-13-20 @ 13:23)      RADIOLOGY:    < from: Xray Foot AP + Lateral + Oblique, Right (10.13.20 @ 13:16) >  IMPRESSION:  No radiographic evidence of acute fracture or dislocation.  Lisfranc joint is intact.  Soft tissue swelling, most notably over dorsum of foot, without radiographic evidence of subcutaneous emphysema or acute osteomyelitis.  Soft tissue defect of right first digit. Unchanged subungual osteophyte at the hallux distal phalanx, which may represent sequela of chronic osteomyelitis.  Degenerative change of midfoot and interphalangeal joints. Osteopenia.  Chronic Achilles tendinosis, with enthesopathic changes at the calcaneus.    < end of copied text >  < from: [facility] Duplex Lower Ext Vein Scan, Right (01.01.19 @ 22:41) >  Impression:    No evidence of deep venous thrombosis or superficial thrombophlebitis in   the right lower extremity.      < end of copied text >    PHYSICAL EXAM:  GEN: No acute distress  LUNGS: Clear to auscultation bilaterally , no wheezes, rales, rhonchi  HEART: Regular rate and rhythm, +s1 S2, no murmurs  ABD: Soft, non-tender, non-distended. +BS throughout  EXT: LUNA/Skin Intact. Right LE erythematous with edema on the right foot, unable to appreciate peripheral pulse. Left LE: no cyanosis, no edema.  NEURO: AAOX3

## 2020-10-14 NOTE — PROGRESS NOTE ADULT - SUBJECTIVE AND OBJECTIVE BOX
RO LEMUS    Patient is a 65y old  Male who presents with a chief complaint of Cellulitis (14 Oct 2020 11:48)    HPI:  66 y/o male with Past medical history of HTN, DLD, BPH, PVD s/p ballon angioplasty presents to the ED with increasing pain due to cellulitis of his right foot.     Patient is presenting from [facility]. This morning he began to experience increased pain, redness, and warmth of his right foot. Patient states that he had a similar episode of cellulitis of his R foot 2 years ago when his right foot was 3 times the size. As per chart review, he was admitted for 3 days in January 2019 for RLE cellulitis and given clindamycin 600 mg q8 during hospital stay and discharged on doxy 100 q8. He also states that he has had infections of his toes in both June and August of this year for which he was treated with an unknown antibiotic. As per chart review, pt’s temperature this morning was 99 and 102 in the ED warranting administration of 1g Vancomycin.     In ED found to febrile, tmax 102, and was given 2L of LR, and 1 gm of vancomycin IV. Patient states that his foot pain has now decreased.     ROS (-) for chest pain, SOB, nausea/vomiting, diarrhea, headache, chills, dizziness, loss of consciousness, dysuria, weight loss, abdominal pain (13 Oct 2020 14:24)    INTERVAL HPI/OVERNIGHT EVENTS:    ROS: All ROS negative except    PHYSICAL EXAM:  T(C): 36.9, Max: 38.1 (10-14-20 @ 00:24)  HR: 85 (85 - 93)  BP: 136/67 (108/64 - 151/66)  RR: 18 (18 - 18)  SpO2: 97% (97% - 98%)    GENERAL: NAD  NECK: Supple, No JVD  PULMONARY/CHEST: No rales, rhonchi, wheezing  CARDIOVASC: Regular rate and rhythm; No murmurs  GI/ABDOMEN: Soft, Nontender, Nondistended; Bowel sounds present  EXTREMITIES:  2+ Peripheral Pulses, No clubbing, cyanosis, or edema, no deformity. No calf tenderness b/l.  SKIN: No rashes or lesions  NERVOUS SYSTEM:  Alert & Oriented X3, no focal deficit     Consultant(s) Notes Reviewed by me.   Care Discussed with Consultants/Other Providers     LABS:                        12.2   11.56 )-----------( 178      ( 14 Oct 2020 05:53 )             37.6     10-14    137  |  105  |  8<L>  ----------------------------<  102<H>  3.5   |  25  |  0.7    Ca    8.4<L>      14 Oct 2020 05:53  Phos  3.7     10-14  Mg     2.1     10-14    TPro  5.7<L>  /  Alb  3.3<L>  /  TBili  0.8  /  DBili  x   /  AST  13  /  ALT  9   /  AlkPhos  51  10-14    ESR 47, CRP 2.47    < from: [facility] Duplex Lower Extrem Arterial, Bilat (10.14.20 @ 08:00) >  Impression:  Normal arterial flow in the bilateral lower extremities.  < end of copied text >    < from: VA Duplex Lower Ext Vein Scan, Bilat (10.14.20 @ 07:59) >  IMPRESSION:  No evidence of deep venous thrombosis in either lower extremity.    < end of copied text >    RADIOLOGY & ADDITIONAL TESTS:  < from: Xray Foot AP + Lateral + Oblique, Right (10.13.20 @ 13:16) >  FINDINGS/  IMPRESSION:  No radiographic evidence of acute fracture or dislocation.  Lisfranc joint is intact.  Soft tissue swelling, most notably over dorsum of foot, without radiographic evidence of subcutaneous emphysema or acute osteomyelitis.  Soft tissue defect of right first digit. Unchanged subungual osteophyte at the hallux distal phalanx, which may represent sequela of chronic osteomyelitis.  Degenerative change of midfoot and interphalangeal joints. Osteopenia.  Chronic Achilles tendinosis, with enthesopathic changes at the calcaneus.    < end of copied text >        MEDICATIONS  (STANDING):  aspirin enteric coated 81 milliGRAM(s) Oral daily  ceFAZolin   IVPB 2000 milliGRAM(s) IV Intermittent every 8 hours  enoxaparin Injectable 40 milliGRAM(s) SubCutaneous daily  omega-3-Acid Ethyl Esters 4 Gram(s) Oral daily  tamsulosin 0.4 milliGRAM(s) Oral at bedtime    MEDICATIONS  (PRN):

## 2020-10-14 NOTE — PROGRESS NOTE ADULT - SUBJECTIVE AND OBJECTIVE BOX
Podiatry Progress Note    Subjective:   RO LEMUS is a pleasant well-nourished, well developed 65y Male in no acute distress, alert awake, and oriented to person, place and time.    Patient is a 65y old  Male who presents with a chief complaint of Cellulitis (14 Oct 2020 06:10)  Patient was seen bedside during AM Rounds w/ Attending; Currently patient denies F/N/V and shortness of breath. Denies any Pedal Complaints at this time;     PAST MEDICAL & SURGICAL HISTORY:  Cellulitis  Charcot ankle, right  Hypercholesteremia  Enlarged prostate  BPH  HTN (hypertension)      Objective:  Vital Signs Last 24 Hrs  T(C): 38.1 (14 Oct 2020 00:24), Max: 39.2 (13 Oct 2020 11:10)  T(F): 100.6 (14 Oct 2020 00:24), Max: 102.6 (13 Oct 2020 11:10)  HR: 88 (14 Oct 2020 00:24) (80 - 93)  BP: 151/66 (14 Oct 2020 00:24) (108/64 - 151/66)  BP(mean): --  RR: 18 (14 Oct 2020 00:24) (17 - 18)  SpO2: 97% (14 Oct 2020 00:24) (97% - 98%)                        12.2   11.56 )-----------( 178      ( 14 Oct 2020 05:53 )             37.6                 10-14    137  |  105  |  8<L>  ----------------------------<  102<H>  3.5   |  25  |  0.7    Ca    8.4<L>      14 Oct 2020 05:53  Phos  3.7     10-14  Mg     2.1     10-14    TPro  5.7<L>  /  Alb  3.3<L>  /  TBili  0.8  /  DBili  x   /  AST  13  /  ALT  9   /  AlkPhos  51  10-14    Physical Exam - Lower Extremity Focused:   Derm:   Open Ulcer on the Distal Plantar Medial Aspect of Hallux; Right Foot;  Does Not Probe to Bone   No Undermining  Hyperkeratotic Periwound; w/o Drainage or Active Bleeding Noted;     Vascular: DP and PT Pulses Diminished;   Neuro: Protective Sensation Diminished    Assessment:  Edema w/ Cellulitis RLE; Improving Since Tuesday 10/13;   Open Ulcer; Right Hallux;     Plan:  Chart reviewed and Patient evaluated. All Questions and Concerns Addressed and Answered  Discussed diagnosis and treatment with patient  Local Wound Care; Flush w/ Normal Saline; Betadine / DSD / Kerlix;   Continue w/ IV ABx;   Continue w/ Local Wound Care; Q24 Dressing Changes;  Patient Stable Per Podiatry Standpoint; Can Follow Up w/ Dr. Henderson Post Discharge;  Discussed Plan w/ Attending;     Podiatry [phone]

## 2020-10-15 ENCOUNTER — TRANSCRIPTION ENCOUNTER (OUTPATIENT)
Age: 65
End: 2020-10-15

## 2020-10-15 VITALS
SYSTOLIC BLOOD PRESSURE: 115 MMHG | HEART RATE: 84 BPM | RESPIRATION RATE: 18 BRPM | TEMPERATURE: 97 F | DIASTOLIC BLOOD PRESSURE: 65 MMHG

## 2020-10-15 LAB
ALBUMIN SERPL ELPH-MCNC: 3.3 G/DL — LOW (ref 3.5–5.2)
ALP SERPL-CCNC: 51 U/L — SIGNIFICANT CHANGE UP (ref 30–115)
ALT FLD-CCNC: 8 U/L — SIGNIFICANT CHANGE UP (ref 0–41)
ANION GAP SERPL CALC-SCNC: 11 MMOL/L — SIGNIFICANT CHANGE UP (ref 7–14)
AST SERPL-CCNC: 12 U/L — SIGNIFICANT CHANGE UP (ref 0–41)
BASOPHILS # BLD AUTO: 0.02 K/UL — SIGNIFICANT CHANGE UP (ref 0–0.2)
BASOPHILS NFR BLD AUTO: 0.2 % — SIGNIFICANT CHANGE UP (ref 0–1)
BILIRUB SERPL-MCNC: 0.3 MG/DL — SIGNIFICANT CHANGE UP (ref 0.2–1.2)
BUN SERPL-MCNC: 11 MG/DL — SIGNIFICANT CHANGE UP (ref 10–20)
CALCIUM SERPL-MCNC: 8.5 MG/DL — SIGNIFICANT CHANGE UP (ref 8.5–10.1)
CHLORIDE SERPL-SCNC: 107 MMOL/L — SIGNIFICANT CHANGE UP (ref 98–110)
CO2 SERPL-SCNC: 25 MMOL/L — SIGNIFICANT CHANGE UP (ref 17–32)
CREAT SERPL-MCNC: 0.8 MG/DL — SIGNIFICANT CHANGE UP (ref 0.7–1.5)
EOSINOPHIL # BLD AUTO: 0.2 K/UL — SIGNIFICANT CHANGE UP (ref 0–0.7)
EOSINOPHIL NFR BLD AUTO: 2.3 % — SIGNIFICANT CHANGE UP (ref 0–8)
GLUCOSE SERPL-MCNC: 89 MG/DL — SIGNIFICANT CHANGE UP (ref 70–99)
HCT VFR BLD CALC: 34.9 % — LOW (ref 42–52)
HGB BLD-MCNC: 11.6 G/DL — LOW (ref 14–18)
IMM GRANULOCYTES NFR BLD AUTO: 0.3 % — SIGNIFICANT CHANGE UP (ref 0.1–0.3)
LYMPHOCYTES # BLD AUTO: 1.39 K/UL — SIGNIFICANT CHANGE UP (ref 1.2–3.4)
LYMPHOCYTES # BLD AUTO: 15.7 % — LOW (ref 20.5–51.1)
MAGNESIUM SERPL-MCNC: 2 MG/DL — SIGNIFICANT CHANGE UP (ref 1.8–2.4)
MCHC RBC-ENTMCNC: 30.3 PG — SIGNIFICANT CHANGE UP (ref 27–31)
MCHC RBC-ENTMCNC: 33.2 G/DL — SIGNIFICANT CHANGE UP (ref 32–37)
MCV RBC AUTO: 91.1 FL — SIGNIFICANT CHANGE UP (ref 80–94)
MONOCYTES # BLD AUTO: 0.81 K/UL — HIGH (ref 0.1–0.6)
MONOCYTES NFR BLD AUTO: 9.2 % — SIGNIFICANT CHANGE UP (ref 1.7–9.3)
NEUTROPHILS # BLD AUTO: 6.38 K/UL — SIGNIFICANT CHANGE UP (ref 1.4–6.5)
NEUTROPHILS NFR BLD AUTO: 72.3 % — SIGNIFICANT CHANGE UP (ref 42.2–75.2)
NRBC # BLD: 0 /100 WBCS — SIGNIFICANT CHANGE UP (ref 0–0)
PLATELET # BLD AUTO: 182 K/UL — SIGNIFICANT CHANGE UP (ref 130–400)
POTASSIUM SERPL-MCNC: 4 MMOL/L — SIGNIFICANT CHANGE UP (ref 3.5–5)
POTASSIUM SERPL-SCNC: 4 MMOL/L — SIGNIFICANT CHANGE UP (ref 3.5–5)
PROT SERPL-MCNC: 6 G/DL — SIGNIFICANT CHANGE UP (ref 6–8)
RBC # BLD: 3.83 M/UL — LOW (ref 4.7–6.1)
RBC # FLD: 14.1 % — SIGNIFICANT CHANGE UP (ref 11.5–14.5)
SODIUM SERPL-SCNC: 143 MMOL/L — SIGNIFICANT CHANGE UP (ref 135–146)
WBC # BLD: 8.83 K/UL — SIGNIFICANT CHANGE UP (ref 4.8–10.8)
WBC # FLD AUTO: 8.83 K/UL — SIGNIFICANT CHANGE UP (ref 4.8–10.8)

## 2020-10-15 PROCEDURE — 99239 HOSP IP/OBS DSCHRG MGMT >30: CPT

## 2020-10-15 RX ORDER — CEFPODOXIME PROXETIL 100 MG
1 TABLET ORAL
Qty: 20 | Refills: 0
Start: 2020-10-15 | End: 2020-10-24

## 2020-10-15 RX ADMIN — Medication 81 MILLIGRAM(S): at 11:04

## 2020-10-15 RX ADMIN — Medication 100 MILLIGRAM(S): at 05:20

## 2020-10-15 RX ADMIN — Medication 100 MILLIGRAM(S): at 13:48

## 2020-10-15 RX ADMIN — Medication 4 GRAM(S): at 11:04

## 2020-10-15 RX ADMIN — ENOXAPARIN SODIUM 40 MILLIGRAM(S): 100 INJECTION SUBCUTANEOUS at 11:04

## 2020-10-15 NOTE — DISCHARGE NOTE NURSING/CASE MANAGEMENT/SOCIAL WORK - PATIENT PORTAL LINK FT
You can access the FollowMyHealth Patient Portal offered by [facility] by registering at the following website: [email or web address]. By joining [facility]’s FollowMyHealth portal, you will also be able to view your health information using other applications (apps) compatible with our system.

## 2020-10-15 NOTE — PROGRESS NOTE ADULT - ASSESSMENT
64 y/o M with PMHx of stroke and cellulitis presenting from assisted living center with increasing pain/swelling of RLE cellulitis s/p 1g vancomycin and 2L LR pending ID/Podiatry recommendations, arterial/venous duplex of LE, in no acute distress improving after antibiotics and IVF.     # Sepsis POA due to RLE cellulitis   - sepsis resolved, pt is not febrile, leucocytosis resolved  - can change Ancef to vantin for 10 more days as per ID recs   - BCX NGTD  - Local Wound Care: Flush w/ Normal Saline; Betadine / DSD / Kerlix Q daily  - OP f/u with podiatry    # HTN - cont home meds    # Hx of stroke? - cont ASA, pt is not on statin, states he couldn't tolerate it, he is on Lovaza.     Pt is clinically stable for discharge today   
64 y/o M with PMhx of stroke and cellulitis presenting from assisted living center with increasing pain/swelling of RLE cellulitis s/p 1g vancomycin and 2L LR pending ID/Podiatry recommendations, arterial/venous duplex of LE, in no acute distress improving after antibiotics and IVF.     # Sepsis POA due to RLE cellulitis   - sepsis resolving  - cont Ancef as per ID recs   - f/u BCX    # HTN - cont home meds    # Hx of stroke? - cont ASA, pt is not on statin, will have to clarify why.    DVT ppx    Ambulate as tolerated
66 y/o M with PMhx of stroke and cellulitis presenting from assisted living center with increasing pain/swelling of RLE cellulitis,. Pt was admitted due to sepsis secondary to RLE cellulitis; s/p 1g vancomycin and 2L LR in ED ,  ID/Podiatry following, in no acute distress improving after antibiotics and IVF. Overnight, pt had fever, s/p Tylenol.    PLAN  # sepsis on admission secondary to RLE cellulitis  - Erythematous/tender/warm at R medial malleolus with associated edema until mid-tibial area  - WBC 16 on admission-->11.5, improving  - Fever 102 on admission; overnight with fever of 100.6, s/p Tylenol, continue to follow for fever  - Lactate 2.4 on admission; trending down to 0.9  - X-ray foot: not suggestive of acute osteomyelitis   - s/p 2L LR, and s/p 1g Vancomycin IV in ED  - Patient has hives/rash allergy to augmentin as recent as this year.   - s/p Clindamycin 600 q8 one dose on 10/14;  - ID recommended Ancef 2g Q8H  - Podiatry following, no acute interventions, c/w local wound care  - venous/arterial duplex negative for PVD and PAD    #BPH  - c/w flomax    # HTN  - reported in chart review  - not on any medications, f/u BP    # PVD  - c/w aspirin    DVT ppx: Heparin subQ  GI ppx: none, tolerating PO diet  Diet: Regular  Activity: Increase as tolerated  Disposition: monitor for absence of fever, monitoring for clinical improvement  CODE status: FULL CODE
64 y/o M with PMhx of stroke and cellulitis presenting from assisted living center with increasing pain/swelling of RLE cellulitis s/p 1g vancomycin and 2L LR pending ID/Podiatry recommendations, arterial/venous duplex of LE, in no acute distress improving after antibiotics and IVF.     IMPRESSION;  Cellulitis RLE/foot which has responded well  WBc 8.8    RECOMMENDATIONS;  PO Vantin 200 mg q12h for 10 more days  recall prn please

## 2020-10-15 NOTE — PHYSICAL THERAPY INITIAL EVALUATION ADULT - GENERAL OBSERVATIONS, REHAB EVAL
Chart reviewed. PT eval 09:15-09:40am. pt seen semi-reclined in bed.In NAD.+ IV lock, + gauze dressing R foot. pt willing to participate in PT eval.

## 2020-10-15 NOTE — DISCHARGE NOTE PROVIDER - HOSPITAL COURSE
64 y/o M with PMhx of stroke and cellulitis presenting from [facility] [place] [facility] [place] with increasing pain/swelling of RLE cellulitis. Pt was admitted due to sepsis secondary to RLE cellulitis; s/p 1g vancomycin and 2L LR in ED. ID/Podiatry was consulted.  s/p Clindamycin 600 q8 one dose on 10/14; ID recommended Ancef 2g Q8H.  WBC 16 on admission, trended down to 8.8. Fever 102 on admission, which resolved( last episode of fever on 10/14- 100.6F, s/p Tylenol), Lactate 2.4 on admission, which trended down to 0.9. X-ray foot: not suggestive of acute osteomyelitis. Venous/arterial duplex negative for PVD and PAD. Blood cultures are negative. Podiatry  was following, no acute interventions, c/w local wound care.

## 2020-10-15 NOTE — DISCHARGE NOTE PROVIDER - CARE PROVIDER_API CALL
Sal Thacker  [address] [place] [address]  [address] [place] [id number]  Phone: [phone]  Fax: [phone]  Follow Up Time: 2 weeks

## 2020-10-15 NOTE — PHYSICAL THERAPY INITIAL EVALUATION ADULT - SITTING BALANCE: STATIC
Spoke with Janine.  She reports that they have been observing staring spells this week.  Janine's  first noticed them about 5 days ago, but the whole family has been seeing them since.  They occur several times a day and last about 10 seconds (they usually have to repeat her name once or twice and then she seems to \"come out of it\").  There are no unusual body movements during the episodes and no postical period.  Janine also reports they had an extended car ride yesterday and when she looked into the backseat Geo eyes were \"jumping up and down\".  She reports movements that sound like nystagmus (with vertical eye movements).  They occurred for about an hour each time she was in the car yesterday.  During those times she was alert and playing with toys.  Janine reports they have continued to give Geo the same dose of Keppra (2ml twice daily).  She has not missed any doses, so Janine is concerned by the very low Keppra level from 9/23/2017 (they are due to repeat the level in about 1.5 weeks).  She does note that they have to store the Keppra in the fridge as Geo will not take it if it as at room temperature, and is wondering if this might affect her levels.  Advised Janine we will be in touch regarding Dr. Yoder's recommendations.  I have also placed a call to our [facility] and they will be contacting the  of levetiracetam to determine if refrigeration affects the efficacy of the medication.     good balance

## 2020-10-15 NOTE — DISCHARGE NOTE PROVIDER - CARE PROVIDERS DIRECT ADDRESSES
[email or web address]

## 2020-10-15 NOTE — DISCHARGE NOTE PROVIDER - NSDCCONDITION_GEN_ALL_CORE
Stable
For information on Fall & Injury Prevention, visit [email or web address]

## 2020-10-15 NOTE — CHART NOTE - NSCHARTNOTEFT_GEN_A_CORE
<<<RESIDENT DISCHARGE NOTE>>>     RO LEMUS  MRN-[id number]    VITAL SIGNS:  T(F): 96.8 (10-15-20 @ 08:13), Max: 98.4 (10-15-20 @ 00:28)  HR: 84 (10-15-20 @ 08:13)  BP: 115/65 (10-15-20 @ 08:13)  SpO2: --    PHYSICAL EXAMINATION:  General: NAD  Head & Neck: normocephalic, atraumatic, supple,   Pulmonary: clear to auscultation b/l, no wheezing  Cardiovascular: +s1 s2, no murmurs, rubs, gallops  Gastrointestinal/Abdomen & Pelvis: soft, non tender non distended, BS present  Neurologic/Motor: moves all extremities, no focal deficits, Right LE erythema (improved from prior day)    TEST RESULTS:                        11.6   8.83  )-----------( 182      ( 15 Oct 2020 06:33 )             34.9       10-15    143  |  107  |  11  ----------------------------<  89  4.0   |  25  |  0.8    Ca    8.5      15 Oct 2020 06:33  Phos  3.7     10-14  Mg     2.0     10-15    TPro  6.0  /  Alb  3.3<L>  /  TBili  0.3  /  DBili  x   /  AST  12  /  ALT  8   /  AlkPhos  51  10-15      FINAL DISCHARGE INTERVIEW:  Resident(s) Present: (Name: Dr. Barbosa_),     DISCHARGE MEDICATION RECONCILIATION  reviewed with Attending (Name:__Dr. Burleson__)    DISPOSITION:   [  ] Home,    [  ] Home with Visiting Nursing Services,   [  x  ]  SNF/ NH,    [   ] Acute Rehab (4A),   [   ] Other (Specify:_________)

## 2020-10-15 NOTE — DISCHARGE NOTE PROVIDER - NSDCMRMEDTOKEN_GEN_ALL_CORE_FT
Aspirin Low Dose 81 mg oral delayed release tablet: 1 tab(s) orally once a day  cefpodoxime 200 mg oral tablet: 1 tab(s) orally 2 times a day   Omega-3 oral capsule: 1 tab(s) orally once a day   tamsulosin 0.4 mg oral capsule: 1 cap(s) orally once a day at bedtime

## 2020-10-15 NOTE — DISCHARGE NOTE PROVIDER - NSDCCPCAREPLAN_GEN_ALL_CORE_FT
PRINCIPAL DISCHARGE DIAGNOSIS  Diagnosis: Cellulitis of lower extremity  Assessment and Plan of Treatment: You were admitted for right lower extremity cellulitis which is soft tissue infection.  Take the prescribed antibiotic medicine you are given as directed until it is gone. Take it even if you feel better. It treats the infection and stops it from  Not taking all the medicine can make future infections hard to treat. Keep the infected area clean. When possible, raise the infected area above the level of your heart. This helps keep swelling down. Talk with your healthcare provider if you are in pain. Ask what kind of over-the-counter medicine you can take for pain. Apply clean bandages as advised.  Wash your hands often to prevent spreading the infection. In the future, wash your hands before and after you touch cuts, scratches, or bandages. This will help prevent infection.   Call your healthcare provider immediately if you have any of the following: Difficulty or pain when moving the joints above or below the infected area, Discharge or pus draining from the area, rever of 100.4°F (38°C) or higher, or as directed by your healthcare provider, pain that gets worse in or around the infected site, redness that gets worse in or around the infected area, particularly if the area of redness expands to a wider area, shaking chills, swelling of the infected area, vomiting.  Please follow up with primary care in 2 weeks, also follow up with podiatry on November 16/2020.

## 2020-10-15 NOTE — PROGRESS NOTE ADULT - SUBJECTIVE AND OBJECTIVE BOX
RO LEMUS    Patient is a 65y old  Male who presents with a chief complaint of Cellulitis (15 Oct 2020 10:40)    INTERVAL HPI/OVERNIGHT EVENTS: no events overnight, pt feels better, right leg pain resolved.    ROS: All ROS negative     PHYSICAL EXAM:  T(C): 36, Max: 36.9 (10-15-20 @ 00:28)  HR: 84 (84 - 85)  BP: 115/65 (115/65 - 135/63)  RR: 18 (18 - 18)  SpO2: --    GENERAL: NAD  NECK: Supple, No JVD  PULMONARY/CHEST: No rales, rhonchi, wheezing  CARDIOVASC: Regular rate and rhythm; No murmurs  GI/ABDOMEN: Soft, Nontender, Nondistended; Bowel sounds present  EXTREMITIES:  Right leg edema and erythema better, but did not resolve completely, not tender. No calf tenderness b/l.  NERVOUS SYSTEM:  Alert & Oriented X3, no focal deficit     Consultant(s) Notes Reviewed by me.     LABS:                        11.6   8.83  )-----------( 182      ( 15 Oct 2020 06:33 )             34.9     10-15    143  |  107  |  11  ----------------------------<  89  4.0   |  25  |  0.8    Ca    8.5      15 Oct 2020 06:33  Phos  3.7     10-14  Mg     2.0     10-15    TPro  6.0  /  Alb  3.3<L>  /  TBili  0.3  /  DBili  x   /  AST  12  /  ALT  8   /  AlkPhos  51  10-15      Culture - Blood (collected 14 Oct 2020 05:53)  Source: .Blood None  Preliminary Report (15 Oct 2020 13:02):    No growth to date.    Culture - Blood (collected 13 Oct 2020 16:49)  Source: .Blood None  Preliminary Report (14 Oct 2020 22:02):    No growth to date.    Culture - Blood (collected 13 Oct 2020 11:40)  Source: .Blood Blood  Preliminary Report (14 Oct 2020 18:02):    No growth to date.    Culture - Blood (collected 13 Oct 2020 11:40)  Source: .Blood Blood  Preliminary Report (14 Oct 2020 18:02):    No growth to date.      RADIOLOGY & ADDITIONAL TESTS:  Impression:    Normal arterial flow in the bilateral lower extremities.      MEDICATIONS  (STANDING):  aspirin enteric coated 81 milliGRAM(s) Oral daily  ceFAZolin   IVPB 2000 milliGRAM(s) IV Intermittent every 8 hours  enoxaparin Injectable 40 milliGRAM(s) SubCutaneous daily  omega-3-Acid Ethyl Esters 4 Gram(s) Oral daily  tamsulosin 0.4 milliGRAM(s) Oral at bedtime    MEDICATIONS  (PRN):

## 2020-10-15 NOTE — PHYSICAL THERAPY INITIAL EVALUATION ADULT - PERTINENT HX OF CURRENT PROBLEM, REHAB EVAL
66 y/o male with Past medical history of HTN, DLD, BPH, PVD s/p ballon angioplasty presents to the ED with increasing pain due to cellulitis of his right foot.

## 2020-10-15 NOTE — PROGRESS NOTE ADULT - SUBJECTIVE AND OBJECTIVE BOX
RO LEMUS  65y, Male    All available historical data reviewed    OVERNIGHT EVENTS:  no fevers  feels well and has no complaints  ROS:  General: Denies rigors, nightsweats  HEENT: Denies headache, rhinorrhea, sore throat, eye pain  CV: Denies CP, palpitations  PULM: Denies wheezing, hemoptysis  GI: Denies hematemesis, hematochezia, melena  : Denies discharge, hematuria  MSK: Denies arthralgias, myalgias  SKIN: Denies rash, lesions  NEURO: Denies paresthesias, weakness  PSYCH: Denies depression, anxiety    VITALS:  T(F): 96.8, Max: 98.4 (10-15-20 @ 00:28)  HR: 84  BP: 115/65  RR: 18Vital Signs Last 24 Hrs  T(C): 36 (15 Oct 2020 08:13), Max: 36.9 (15 Oct 2020 00:28)  T(F): 96.8 (15 Oct 2020 08:13), Max: 98.4 (15 Oct 2020 00:28)  HR: 84 (15 Oct 2020 08:13) (83 - 85)  BP: 115/65 (15 Oct 2020 08:13) (115/65 - 135/63)  BP(mean): --  RR: 18 (15 Oct 2020 08:13) (18 - 18)  SpO2: --    TESTS & MEASUREMENTS:                        11.6   8.83  )-----------( 182      ( 15 Oct 2020 06:33 )             34.9     10-15    143  |  107  |  11  ----------------------------<  89  4.0   |  25  |  0.8    Ca    8.5      15 Oct 2020 06:33  Phos  3.7     10-14  Mg     2.0     10-15    TPro  6.0  /  Alb  3.3<L>  /  TBili  0.3  /  DBili  x   /  AST  12  /  ALT  8   /  AlkPhos  51  10-15    LIVER FUNCTIONS - ( 15 Oct 2020 06:33 )  Alb: 3.3 g/dL / Pro: 6.0 g/dL / ALK PHOS: 51 U/L / ALT: 8 U/L / AST: 12 U/L / GGT: x             Culture - Blood (collected 10-13-20 @ 16:49)  Source: .Blood None  Preliminary Report (10-14-20 @ 22:02):    No growth to date.    Culture - Blood (collected 10-13-20 @ 11:40)  Source: .Blood Blood  Preliminary Report (10-14-20 @ 18:02):    No growth to date.    Culture - Blood (collected 10-13-20 @ 11:40)  Source: .Blood Blood  Preliminary Report (10-14-20 @ 18:02):    No growth to date.            RADIOLOGY & ADDITIONAL TESTS:  Personal review of radiological diagnostics performed  Echo and EKG results noted when applicable.     MEDICATIONS:  aspirin enteric coated 81 milliGRAM(s) Oral daily  ceFAZolin   IVPB 2000 milliGRAM(s) IV Intermittent every 8 hours  enoxaparin Injectable 40 milliGRAM(s) SubCutaneous daily  omega-3-Acid Ethyl Esters 4 Gram(s) Oral daily  tamsulosin 0.4 milliGRAM(s) Oral at bedtime      ANTIBIOTICS:  ceFAZolin   IVPB 2000 milliGRAM(s) IV Intermittent every 8 hours

## 2020-10-18 LAB
CULTURE RESULTS: SIGNIFICANT CHANGE UP
SPECIMEN SOURCE: SIGNIFICANT CHANGE UP

## 2020-10-19 DIAGNOSIS — L03.115 CELLULITIS OF RIGHT LOWER LIMB: ICD-10-CM

## 2020-10-19 DIAGNOSIS — Z86.73 PERSONAL HISTORY OF TRANSIENT ISCHEMIC ATTACK (TIA), AND CEREBRAL INFARCTION WITHOUT RESIDUAL DEFICITS: ICD-10-CM

## 2020-10-19 DIAGNOSIS — A41.9 SEPSIS, UNSPECIFIED ORGANISM: ICD-10-CM

## 2020-10-19 DIAGNOSIS — Z79.82 LONG TERM (CURRENT) USE OF ASPIRIN: ICD-10-CM

## 2020-10-19 DIAGNOSIS — L97.519 NON-PRESSURE CHRONIC ULCER OF OTHER PART OF RIGHT FOOT WITH UNSPECIFIED SEVERITY: ICD-10-CM

## 2020-10-19 DIAGNOSIS — E66.9 OBESITY, UNSPECIFIED: ICD-10-CM

## 2020-10-19 DIAGNOSIS — I73.9 PERIPHERAL VASCULAR DISEASE, UNSPECIFIED: ICD-10-CM

## 2020-10-19 DIAGNOSIS — N40.0 BENIGN PROSTATIC HYPERPLASIA WITHOUT LOWER URINARY TRACT SYMPTOMS: ICD-10-CM

## 2020-10-19 DIAGNOSIS — I10 ESSENTIAL (PRIMARY) HYPERTENSION: ICD-10-CM

## 2020-10-19 DIAGNOSIS — E78.5 HYPERLIPIDEMIA, UNSPECIFIED: ICD-10-CM

## 2020-10-19 DIAGNOSIS — Z87.891 PERSONAL HISTORY OF NICOTINE DEPENDENCE: ICD-10-CM

## 2020-10-19 LAB
CULTURE RESULTS: SIGNIFICANT CHANGE UP
SPECIMEN SOURCE: SIGNIFICANT CHANGE UP

## 2020-11-16 ENCOUNTER — OUTPATIENT (OUTPATIENT)
Dept: OUTPATIENT SERVICES | Facility: HOSPITAL | Age: 65
LOS: 1 days | Discharge: HOME | End: 2020-11-16

## 2020-11-16 ENCOUNTER — APPOINTMENT (OUTPATIENT)
Dept: PODIATRY | Facility: CLINIC | Age: 65
End: 2020-11-16
Payer: MEDICAID

## 2020-11-16 PROBLEM — L03.90 CELLULITIS, UNSPECIFIED: Chronic | Status: ACTIVE | Noted: 2020-10-13

## 2020-11-16 PROCEDURE — 99213 OFFICE O/P EST LOW 20 MIN: CPT | Mod: NC

## 2020-11-16 RX ORDER — SILVER SULFADIAZINE 10 MG/G
1 CREAM TOPICAL DAILY
Qty: 1 | Refills: 1 | Status: ACTIVE | COMMUNITY
Start: 2020-09-14 | End: 1900-01-01

## 2020-11-17 NOTE — HISTORY OF PRESENT ILLNESS
[Diabetic Shoes] : diabetic shoes [FreeTextEntry1] : 66 y/o male here today with recurrence of right hallux ulcer. Pt reports bleeding from the right toe nail several days ago. Here for evaluation and treatment.

## 2020-11-17 NOTE — ASSESSMENT
[FreeTextEntry1] : Rx cefpodoxime, aware of PCN allergy. pt was prescribed medicine in the past\par Rx silvadene\par local wound care w/ daily dressing changes and application of SSD\par return 2 weeks

## 2020-11-17 NOTE — PHYSICAL EXAM
[General Appearance - Alert] : alert [General Appearance - In No Acute Distress] : in no acute distress [] : on both lower extremities [de-identified] : aseptic removal off offending nail wedge with nipper  [FreeTextEntry1] : hallux  [de-identified] : down to dermal layer [TWNoteComboBox1] : Right [TWNoteComboBox2] : 2 [TWNoteComboBox4] : None [TWNoteComboBox5] : No [de-identified] : No [de-identified] : Erythema [de-identified] : 100% [TWNoteComboBox7] : Shirley [de-identified] : Debridement excisional [Oriented To Time, Place, And Person] : oriented to person, place, and time

## 2020-11-30 ENCOUNTER — APPOINTMENT (OUTPATIENT)
Dept: PODIATRY | Facility: CLINIC | Age: 65
End: 2020-11-30
Payer: MEDICAID

## 2020-11-30 ENCOUNTER — OUTPATIENT (OUTPATIENT)
Dept: OUTPATIENT SERVICES | Facility: HOSPITAL | Age: 65
LOS: 1 days | Discharge: HOME | End: 2020-11-30

## 2020-11-30 DIAGNOSIS — L03.115 CELLULITIS OF RIGHT LOWER LIMB: ICD-10-CM

## 2020-11-30 DIAGNOSIS — L89.892 PRESSURE ULCER OF OTHER SITE, STAGE 2: ICD-10-CM

## 2020-11-30 PROCEDURE — 99213 OFFICE O/P EST LOW 20 MIN: CPT | Mod: NC

## 2020-11-30 NOTE — PHYSICAL EXAM
[General Appearance - Alert] : alert [General Appearance - In No Acute Distress] : in no acute distress [] : on both lower extremities [Oriented To Time, Place, And Person] : oriented to person, place, and time [de-identified] : resolution of erythema\par ulcer is healing well [FreeTextEntry1] : hallux  [FreeTextEntry2] : 0.5x .05 [de-identified] : down to dermal layer [TWNoteComboBox1] : Right [TWNoteComboBox2] : 2 [TWNoteComboBox4] : None [TWNoteComboBox5] : No [de-identified] : No [de-identified] : Normal [de-identified] : 100% [TWNoteComboBox7] : Shirley [de-identified] : Debridement excisional

## 2020-11-30 NOTE — ASSESSMENT
[FreeTextEntry1] : pt completed a course of antibiotics \par continue w/ local wound care w/ daily dressing changes and application of SSD\par return 3  weeks

## 2020-11-30 NOTE — HISTORY OF PRESENT ILLNESS
[Diabetic Shoes] : diabetic shoes [FreeTextEntry1] : 64 y/o male here today with recurrence of right hallux ulcer. Pt reports bleeding from the right toe nail several days ago. Here for evaluation and treatment.

## 2020-12-21 ENCOUNTER — OUTPATIENT (OUTPATIENT)
Dept: OUTPATIENT SERVICES | Facility: HOSPITAL | Age: 65
LOS: 1 days | Discharge: HOME | End: 2020-12-21

## 2020-12-21 ENCOUNTER — APPOINTMENT (OUTPATIENT)
Dept: PODIATRY | Facility: CLINIC | Age: 65
End: 2020-12-21
Payer: MEDICAID

## 2020-12-21 PROCEDURE — 97597 DBRDMT OPN WND 1ST 20 CM/<: CPT | Mod: NC

## 2020-12-22 NOTE — END OF VISIT
[Resident] : Resident [FreeTextEntry3] : no signs of infection. \par right hallux ulcer is improving\par local wound care as above [FreeTextEntry2] : sharp excisional debridement down to dermal layer

## 2020-12-22 NOTE — PHYSICAL EXAM
[General Appearance - Alert] : alert [General Appearance - In No Acute Distress] : in no acute distress [] : on both lower extremities [Oriented To Time, Place, And Person] : oriented to person, place, and time [de-identified] : resolution of erythema\par ulcer is healing well [FreeTextEntry1] : hallux  [FreeTextEntry2] : 0.3x 0.3 [de-identified] : down to dermal layer [TWNoteComboBox1] : Right [TWNoteComboBox2] : 2 [TWNoteComboBox4] : None [TWNoteComboBox5] : No [de-identified] : No [de-identified] : Normal [de-identified] : 100% [TWNoteComboBox7] : Shirley [de-identified] : Debridement excisional

## 2020-12-22 NOTE — ASSESSMENT
[FreeTextEntry1] : ulcer plantar R hallux improving, cont LWC\par pt completed a course of antibiotics \par continue w/ local wound care w/ daily dressing changes and application of SSD\par return 4 weeks

## 2020-12-22 NOTE — HISTORY OF PRESENT ILLNESS
[Diabetic Shoes] : diabetic shoes [FreeTextEntry1] : 66 y/o male here today with recurrence of right hallux ulcer.

## 2021-01-25 ENCOUNTER — APPOINTMENT (OUTPATIENT)
Dept: PODIATRY | Facility: CLINIC | Age: 66
End: 2021-01-25
Payer: MEDICAID

## 2021-01-25 ENCOUNTER — OUTPATIENT (OUTPATIENT)
Dept: OUTPATIENT SERVICES | Facility: HOSPITAL | Age: 66
LOS: 1 days | Discharge: HOME | End: 2021-01-25

## 2021-01-25 ENCOUNTER — OUTPATIENT (OUTPATIENT)
Dept: OUTPATIENT SERVICES | Facility: HOSPITAL | Age: 66
LOS: 1 days | Discharge: HOME | End: 2021-01-25
Payer: MEDICAID

## 2021-01-25 DIAGNOSIS — M79.674 PAIN IN RIGHT TOE(S): ICD-10-CM

## 2021-01-25 DIAGNOSIS — S90.851A SUPERFICIAL FOREIGN BODY, RIGHT FOOT, INITIAL ENCOUNTER: ICD-10-CM

## 2021-01-25 DIAGNOSIS — L89.899 PRESSURE ULCER OF OTHER SITE, UNSPECIFIED STAGE: ICD-10-CM

## 2021-01-25 PROCEDURE — 73660 X-RAY EXAM OF TOE(S): CPT | Mod: 26,RT

## 2021-01-25 PROCEDURE — 99213 OFFICE O/P EST LOW 20 MIN: CPT | Mod: NC

## 2021-01-26 PROBLEM — S90.851A FOREIGN BODY IN RIGHT FOOT, INITIAL ENCOUNTER: Status: ACTIVE | Noted: 2021-01-26

## 2021-01-26 NOTE — ASSESSMENT
[FreeTextEntry1] : ulcer plantar R hallux improving, cont LWC\par Right medial aspect hallux pinched tyloma debrided;\par Right lateral aspect small puncture entry point noted; possible foreign body, xray ordered; will follow up.\par return 4 weeks

## 2021-01-26 NOTE — PHYSICAL EXAM
[General Appearance - Alert] : alert [General Appearance - In No Acute Distress] : in no acute distress [] : on both lower extremities [Oriented To Time, Place, And Person] : oriented to person, place, and time [FreeTextEntry1] : right hallux ulcer has healed\par noted small pin point area on lateral plantar tuft of right hallux w/ soft tissue mass/induration-->foreign body? [de-identified] : resolution of erythema\par ulcer is healing well [de-identified] : down to dermal layer [TWNoteComboBox1] : Right [TWNoteComboBox2] : 1 [TWNoteComboBox4] : None [TWNoteComboBox5] : No [de-identified] : No [de-identified] : Normal [de-identified] : 100% [TWNoteComboBox7] : Shirley [de-identified] : Debridement excisional

## 2021-01-26 NOTE — HISTORY OF PRESENT ILLNESS
[Diabetic Shoes] : diabetic shoes [FreeTextEntry1] : 64 y/o male here today with recurrence of right hallux ulcer. Says noted right lateral aspect hallux drainage few days ago.

## 2021-03-22 ENCOUNTER — APPOINTMENT (OUTPATIENT)
Dept: PODIATRY | Facility: CLINIC | Age: 66
End: 2021-03-22
Payer: MEDICAID

## 2021-03-22 ENCOUNTER — OUTPATIENT (OUTPATIENT)
Dept: OUTPATIENT SERVICES | Facility: HOSPITAL | Age: 66
LOS: 1 days | Discharge: HOME | End: 2021-03-22

## 2021-03-22 PROCEDURE — 99212 OFFICE O/P EST SF 10 MIN: CPT | Mod: NC

## 2021-03-22 NOTE — HISTORY OF PRESENT ILLNESS
[Diabetic Shoes] : diabetic shoes [FreeTextEntry1] : 64 y/o male here today with recurrence of right hallux ulcer. pt has changed his dressing since last visit. Secondary complaint of nail dystrophy

## 2021-03-22 NOTE — ASSESSMENT
[FreeTextEntry1] : right hallux hyperkeratosis debrided down to dermal layer with 15 blade. healthy epithelial skin. no signs of infection\par xrays reviewed-->no foreign body. results discussed with patient \par mycotic nails debrided x 10\par return 2 months

## 2021-03-22 NOTE — PHYSICAL EXAM
[General Appearance - Alert] : alert [General Appearance - In No Acute Distress] : in no acute distress [] : on both lower extremities [Oriented To Time, Place, And Person] : oriented to person, place, and time [FreeTextEntry1] : right hallux hyperkeratosis\par dystrophic nails x 10 [de-identified] : resolution of erythema\par ulcer is healing well [de-identified] : down to dermal layer [TWNoteComboBox7] : Shirley [de-identified] : Debridement excisional

## 2021-06-08 ENCOUNTER — APPOINTMENT (OUTPATIENT)
Dept: PODIATRY | Facility: CLINIC | Age: 66
End: 2021-06-08

## 2021-06-21 ENCOUNTER — OUTPATIENT (OUTPATIENT)
Dept: OUTPATIENT SERVICES | Facility: HOSPITAL | Age: 66
LOS: 1 days | Discharge: HOME | End: 2021-06-21

## 2021-06-21 ENCOUNTER — APPOINTMENT (OUTPATIENT)
Dept: PODIATRY | Facility: CLINIC | Age: 66
End: 2021-06-21
Payer: MEDICAID

## 2021-06-21 DIAGNOSIS — L97.519 NON-PRESSURE CHRONIC ULCER OF OTHER PART OF RIGHT FOOT WITH UNSPECIFIED SEVERITY: ICD-10-CM

## 2021-06-21 PROCEDURE — 11721 DEBRIDE NAIL 6 OR MORE: CPT | Mod: NC,59

## 2021-06-21 PROCEDURE — 97597 DBRDMT OPN WND 1ST 20 CM/<: CPT | Mod: NC,T5

## 2021-06-23 NOTE — ASSESSMENT
[FreeTextEntry1] : Hyperkeratosis\par Mycotic Elongated Toenails \par \par Pt seen and evaluated\par right hallux hyperkeratosis debrided down to dermal layer with 15 blade. healthy epithelial skin. no signs of infection\par mycotic nails debrided x 10\par return 2 months

## 2021-06-23 NOTE — HISTORY OF PRESENT ILLNESS
[Diabetic Shoes] : diabetic shoes [FreeTextEntry1] : 66 y/o male presents for debridement of elongated toenails and calluses that are painful on ambulation\par Recurrent hx of callus to Right 1st digit \par Pt ambulates with a walker

## 2021-06-23 NOTE — PHYSICAL EXAM
[General Appearance - Alert] : alert [General Appearance - In No Acute Distress] : in no acute distress [] : on both lower extremities [2+] : left foot dorsalis pedis 2+ [Oriented To Time, Place, And Person] : oriented to person, place, and time [FreeTextEntry1] : right hallux hyperkeratosis\par dystrophic nails x 10\par Hyperpigmentation to B/L LE from healed venous stasis ulcers  [de-identified] : resolution of erythema\par ulcer is healing well [de-identified] : down to dermal layer [TWNoteComboBox7] : Shirley [de-identified] : Debridement excisional

## 2021-06-23 NOTE — END OF VISIT
[] : Resident [Resident] : Resident [FreeTextEntry3] : right hallux preulcerative lesion. no signs of infection [FreeTextEntry2] : right foot ulcer  excisionally debrided of fibrotic/devitalized tissue down to dermal layer. Performed under sterile conditions with curette and scalpel.\par -Aseptic debridement of all fungal nails.  Patient has pain about the toes secondary to nail pressure and relates improvement with periodic debridement.\par -discussed treatment options for onychomycosis including oral medications. Patients opts for periodic nail debridement with topical \par

## 2021-06-29 DIAGNOSIS — R26.2 DIFFICULTY IN WALKING, NOT ELSEWHERE CLASSIFIED: ICD-10-CM

## 2021-06-29 DIAGNOSIS — Q82.8 OTHER SPECIFIED CONGENITAL MALFORMATIONS OF SKIN: ICD-10-CM

## 2021-06-29 DIAGNOSIS — B35.1 TINEA UNGUIUM: ICD-10-CM

## 2021-06-29 DIAGNOSIS — L97.519 NON-PRESSURE CHRONIC ULCER OF OTHER PART OF RIGHT FOOT WITH UNSPECIFIED SEVERITY: ICD-10-CM

## 2021-08-23 ENCOUNTER — APPOINTMENT (OUTPATIENT)
Dept: PODIATRY | Facility: CLINIC | Age: 66
End: 2021-08-23
Payer: MEDICARE

## 2021-08-23 ENCOUNTER — OUTPATIENT (OUTPATIENT)
Dept: OUTPATIENT SERVICES | Facility: HOSPITAL | Age: 66
LOS: 1 days | Discharge: HOME | End: 2021-08-23

## 2021-08-23 PROCEDURE — 11721 DEBRIDE NAIL 6 OR MORE: CPT

## 2021-08-25 NOTE — HISTORY OF PRESENT ILLNESS
[Diabetic Shoes] : diabetic shoes [FreeTextEntry1] : 67 y/o male presents for debridement of elongated toenails and calluses that are painful on ambulation\par Recurrent hx of callus to Right 1st digit \par Pt ambulates with a walker

## 2021-08-25 NOTE — END OF VISIT
[] : Resident [Resident] : Resident [FreeTextEntry2] : \par -Aseptic debridement of all fungal nails.  Patient has pain about the toes secondary to nail pressure and relates improvement with periodic debridement.\par -discussed treatment options for onychomycosis including oral medications. Patients opts for periodic nail debridement with topical \par

## 2021-08-25 NOTE — PHYSICAL EXAM
[General Appearance - Alert] : alert [General Appearance - In No Acute Distress] : in no acute distress [] : on both lower extremities [2+] : left foot dorsalis pedis 2+ [Oriented To Time, Place, And Person] : oriented to person, place, and time [FreeTextEntry1] : right hallux hyperkeratosis\par dystrophic nails x 10\par Hyperpigmentation to B/L LE from healed venous stasis ulcers  [de-identified] : resolution of erythema\par ulcer is healing well [de-identified] : down to dermal layer [de-identified] : Debridement excisional [TWNoteComboBox7] : Shirley

## 2021-08-31 DIAGNOSIS — B35.1 TINEA UNGUIUM: ICD-10-CM

## 2021-08-31 DIAGNOSIS — R26.2 DIFFICULTY IN WALKING, NOT ELSEWHERE CLASSIFIED: ICD-10-CM

## 2021-10-18 ENCOUNTER — APPOINTMENT (OUTPATIENT)
Dept: PODIATRY | Facility: CLINIC | Age: 66
End: 2021-10-18
Payer: MEDICARE

## 2021-10-18 ENCOUNTER — OUTPATIENT (OUTPATIENT)
Dept: OUTPATIENT SERVICES | Facility: HOSPITAL | Age: 66
LOS: 1 days | Discharge: HOME | End: 2021-10-18

## 2021-10-18 DIAGNOSIS — L60.3 NAIL DYSTROPHY: ICD-10-CM

## 2021-10-18 PROCEDURE — 99213 OFFICE O/P EST LOW 20 MIN: CPT | Mod: 25

## 2021-10-18 PROCEDURE — 11721 DEBRIDE NAIL 6 OR MORE: CPT

## 2021-10-21 DIAGNOSIS — L60.3 NAIL DYSTROPHY: ICD-10-CM

## 2021-10-21 DIAGNOSIS — R26.2 DIFFICULTY IN WALKING, NOT ELSEWHERE CLASSIFIED: ICD-10-CM

## 2021-10-21 DIAGNOSIS — B35.1 TINEA UNGUIUM: ICD-10-CM

## 2021-10-21 NOTE — HISTORY OF PRESENT ILLNESS
[Diabetic Shoes] : diabetic shoes [FreeTextEntry1] : 65 y/o male presents for debridement of elongated toenails and calluses that are painful on ambulation\par Recurrent hx of callus to Right 1st digit \par Pt ambulates with a walker

## 2021-10-21 NOTE — ASSESSMENT
[FreeTextEntry1] : Hyperkeratosis\par Mycotic Elongated Toenails \par \par Pt seen and evaluated\par mycotic nails debrided x 10\par return 2 months

## 2021-10-21 NOTE — PHYSICAL EXAM
[General Appearance - Alert] : alert [General Appearance - In No Acute Distress] : in no acute distress [] : on both lower extremities [2+] : left foot dorsalis pedis 2+ [Oriented To Time, Place, And Person] : oriented to person, place, and time [FreeTextEntry1] : right hallux hyperkeratosis\par dystrophic nails x 10\par Hyperpigmentation to B/L LE from healed venous stasis ulcers  [de-identified] : resolution of erythema\par ulcer is healing well [de-identified] : down to dermal layer [TWNoteComboBox7] : Shirley [de-identified] : Debridement excisional

## 2021-12-13 ENCOUNTER — APPOINTMENT (OUTPATIENT)
Dept: PODIATRY | Facility: CLINIC | Age: 66
End: 2021-12-13
Payer: MEDICARE

## 2021-12-13 ENCOUNTER — OUTPATIENT (OUTPATIENT)
Dept: OUTPATIENT SERVICES | Facility: HOSPITAL | Age: 66
LOS: 1 days | Discharge: HOME | End: 2021-12-13

## 2021-12-13 VITALS
OXYGEN SATURATION: 98 % | DIASTOLIC BLOOD PRESSURE: 79 MMHG | BODY MASS INDEX: 29.22 KG/M2 | HEART RATE: 80 BPM | WEIGHT: 240 LBS | SYSTOLIC BLOOD PRESSURE: 142 MMHG | HEIGHT: 76 IN

## 2021-12-13 DIAGNOSIS — B35.1 TINEA UNGUIUM: ICD-10-CM

## 2021-12-13 DIAGNOSIS — M79.671 PAIN IN RIGHT FOOT: ICD-10-CM

## 2021-12-13 DIAGNOSIS — M79.672 PAIN IN LEFT FOOT: ICD-10-CM

## 2021-12-13 PROCEDURE — 11721 DEBRIDE NAIL 6 OR MORE: CPT

## 2021-12-13 NOTE — HISTORY OF PRESENT ILLNESS
[Diabetic Shoes] : diabetic shoes [FreeTextEntry1] : 65 y/o male presents for debridement of elongated toenails and calluses that are painful on ambulation. Pt. denies any other pedal complaints at this time. \par \par

## 2021-12-13 NOTE — PHYSICAL EXAM
[General Appearance - Alert] : alert [General Appearance - In No Acute Distress] : in no acute distress [] : on both lower extremities [1+] : left foot dorsalis pedis 1+ [Oriented To Time, Place, And Person] : oriented to person, place, and time [FreeTextEntry1] : Severe dystrophic nails x 10\par Hyperpigmentation noted to B/L LE from healed venous stasis ulcers  [TWNoteComboBox7] : False [de-identified] : False

## 2021-12-13 NOTE — ASSESSMENT
[FreeTextEntry1] : Onychomycosis, B/L Foot \par \par Pt seen and evaluated\par mycotic nails debrided x 10\par Pt. RTC  2-3  months

## 2022-03-14 ENCOUNTER — APPOINTMENT (OUTPATIENT)
Dept: PODIATRY | Facility: CLINIC | Age: 67
End: 2022-03-14
Payer: MEDICARE

## 2022-03-14 ENCOUNTER — OUTPATIENT (OUTPATIENT)
Dept: OUTPATIENT SERVICES | Facility: HOSPITAL | Age: 67
LOS: 1 days | Discharge: HOME | End: 2022-03-14

## 2022-03-14 PROCEDURE — 11721 DEBRIDE NAIL 6 OR MORE: CPT

## 2022-03-15 NOTE — HISTORY OF PRESENT ILLNESS
[Diabetic Shoes] : diabetic shoes [FreeTextEntry1] : 67 y/o male returns  for debridement of elongated toenails and calluses that are painful on ambulation. Pt. denies any other pedal complaints at this time. \par \par

## 2022-03-15 NOTE — PHYSICAL EXAM
[General Appearance - Alert] : alert [General Appearance - In No Acute Distress] : in no acute distress [] : on both lower extremities [1+] : left foot dorsalis pedis 1+ [Oriented To Time, Place, And Person] : oriented to person, place, and time [FreeTextEntry1] : Severe dystrophic nails x 10\par Hyperpigmentation noted to B/L LE from healed venous stasis ulcers

## 2022-03-23 ENCOUNTER — OUTPATIENT (OUTPATIENT)
Dept: OUTPATIENT SERVICES | Facility: HOSPITAL | Age: 67
LOS: 1 days | Discharge: HOME | End: 2022-03-23

## 2022-05-16 ENCOUNTER — OUTPATIENT (OUTPATIENT)
Dept: OUTPATIENT SERVICES | Facility: HOSPITAL | Age: 67
LOS: 1 days | Discharge: HOME | End: 2022-05-16

## 2022-05-16 ENCOUNTER — APPOINTMENT (OUTPATIENT)
Dept: PODIATRY | Facility: CLINIC | Age: 67
End: 2022-05-16
Payer: MEDICARE

## 2022-05-16 VITALS
TEMPERATURE: 98.3 F | HEIGHT: 76 IN | BODY MASS INDEX: 28.62 KG/M2 | SYSTOLIC BLOOD PRESSURE: 137 MMHG | WEIGHT: 235 LBS | DIASTOLIC BLOOD PRESSURE: 76 MMHG

## 2022-05-16 PROCEDURE — 11721 DEBRIDE NAIL 6 OR MORE: CPT | Mod: GC

## 2022-05-17 NOTE — HISTORY OF PRESENT ILLNESS
[Diabetic Shoes] : diabetic shoes [Walker] : a walker [FreeTextEntry1] : CC: "Long nails"\par HPI:\par -67 y/o male returns for debridement of elongated toenails and calluses that are painful on ambulation. \par -Pt. denies any other pedal complaints at this time.\par \par

## 2022-05-17 NOTE — PHYSICAL EXAM
[General Appearance - Alert] : alert [General Appearance - In No Acute Distress] : in no acute distress [General Appearance - Well Nourished] : well nourished [General Appearance - Well Developed] : well developed [Ankle Swelling Bilaterally] : bilaterally  [Delayed in the Right Toes] : capillary refills delayed in the right toes [Delayed in the Left Toes] : capillary refills delayed in the left toes [1+] : left foot dorsalis pedis 1+ [] : normal strength/tone [Sensation] : the sensory exam was normal to light touch and pinprick [Oriented To Time, Place, And Person] : oriented to person, place, and time [Skin Induration] : skin induration [Ankle Swelling (On Exam)] : not present [Varicose Veins Of Lower Extremities] : not present [de-identified] : Midfoot collapse, Right  [Foot Ulcer] : no foot ulcer [FreeTextEntry1] : Severe dystrophic nails x 10\par Hyperpigmentation noted to B/L LE from healed venous stasis ulcers \par Callus, Right hallux medial IPJ\par Skin thin & atrophic, b/l

## 2022-05-17 NOTE — ASSESSMENT
[Verbal] : verbal [Patient] : patient [Good - alert, interested, motivated] : Good - alert, interested, motivated [Demonstrates independently] : demonstrates independently [FreeTextEntry1] : Assessment:\par -Onychomycosis, B/L Foot \par -Neuropathic arthropathy\par \par Plan:\par -Pt seen and evaluated\par -Mycotic nails debrided x 10\par -Pt. RTC 2-3  months

## 2022-08-15 ENCOUNTER — APPOINTMENT (OUTPATIENT)
Dept: PODIATRY | Facility: CLINIC | Age: 67
End: 2022-08-15

## 2022-08-15 ENCOUNTER — OUTPATIENT (OUTPATIENT)
Dept: OUTPATIENT SERVICES | Facility: HOSPITAL | Age: 67
LOS: 1 days | Discharge: HOME | End: 2022-08-15

## 2022-08-15 PROCEDURE — 11721 DEBRIDE NAIL 6 OR MORE: CPT | Mod: GC

## 2022-08-15 NOTE — PHYSICAL EXAM
[General Appearance - Alert] : alert [General Appearance - In No Acute Distress] : in no acute distress [General Appearance - Well Nourished] : well nourished [General Appearance - Well Developed] : well developed [Ankle Swelling Bilaterally] : bilaterally  [Delayed in the Right Toes] : capillary refills delayed in the right toes [Delayed in the Left Toes] : capillary refills delayed in the left toes [1+] : left foot dorsalis pedis 1+ [] : normal strength/tone [Skin Induration] : skin induration [Sensation] : the sensory exam was normal to light touch and pinprick [Oriented To Time, Place, And Person] : oriented to person, place, and time [Ankle Swelling (On Exam)] : not present [Varicose Veins Of Lower Extremities] : not present [de-identified] : Midfoot collapse, Right  [Foot Ulcer] : no foot ulcer [FreeTextEntry1] : Severe dystrophic nails x 10\par Hyperpigmentation noted to B/L LE from healed venous stasis ulcers \par Callus, Right hallux medial IPJ\par Skin thin & atrophic, b/l

## 2022-08-15 NOTE — ASSESSMENT
[Verbal] : verbal [Patient] : patient [Good - alert, interested, motivated] : Good - alert, interested, motivated [Demonstrates independently] : demonstrates independently [FreeTextEntry1] : \par Plan:\par -Pt seen and evaluated\par -Mycotic nails debrided x 10\par -Pt. RTC 2-3  months

## 2022-08-15 NOTE — HISTORY OF PRESENT ILLNESS
[Diabetic Shoes] : diabetic shoes [Walker] : a walker [FreeTextEntry1] : CC: "Long nails"\par HPI:\par -66 y/o male returns for debridement of elongated toenails and calluses that are painful on ambulation. \par -Pt. denies any other pedal complaints at this time.\par \par

## 2022-08-17 DIAGNOSIS — M79.671 PAIN IN RIGHT FOOT: ICD-10-CM

## 2022-08-17 DIAGNOSIS — M79.672 PAIN IN LEFT FOOT: ICD-10-CM

## 2022-08-17 DIAGNOSIS — B35.1 TINEA UNGUIUM: ICD-10-CM

## 2022-09-14 NOTE — PATIENT PROFILE ADULT - CENTRAL VENOUS CATHETER/PICC LINE
can you please set up an echo the same day she sees Dr Reece Mccall, prior the appt.  The echo is ordered, patient lives 2 hours away, want it on same day prior appt, thanks
no

## 2022-10-13 ENCOUNTER — EMERGENCY (EMERGENCY)
Facility: HOSPITAL | Age: 67
LOS: 0 days | Discharge: HOME | End: 2022-10-14
Attending: STUDENT IN AN ORGANIZED HEALTH CARE EDUCATION/TRAINING PROGRAM | Admitting: STUDENT IN AN ORGANIZED HEALTH CARE EDUCATION/TRAINING PROGRAM

## 2022-10-13 VITALS
RESPIRATION RATE: 18 BRPM | HEART RATE: 72 BPM | HEIGHT: 76 IN | OXYGEN SATURATION: 99 % | SYSTOLIC BLOOD PRESSURE: 133 MMHG | TEMPERATURE: 98 F | DIASTOLIC BLOOD PRESSURE: 76 MMHG | WEIGHT: 250 LBS

## 2022-10-13 DIAGNOSIS — Z87.2 PERSONAL HISTORY OF DISEASES OF THE SKIN AND SUBCUTANEOUS TISSUE: ICD-10-CM

## 2022-10-13 DIAGNOSIS — Z79.82 LONG TERM (CURRENT) USE OF ASPIRIN: ICD-10-CM

## 2022-10-13 DIAGNOSIS — N20.0 CALCULUS OF KIDNEY: ICD-10-CM

## 2022-10-13 DIAGNOSIS — Z88.1 ALLERGY STATUS TO OTHER ANTIBIOTIC AGENTS STATUS: ICD-10-CM

## 2022-10-13 DIAGNOSIS — Z88.8 ALLERGY STATUS TO OTHER DRUGS, MEDICAMENTS AND BIOLOGICAL SUBSTANCES: ICD-10-CM

## 2022-10-13 DIAGNOSIS — R31.9 HEMATURIA, UNSPECIFIED: ICD-10-CM

## 2022-10-13 DIAGNOSIS — I10 ESSENTIAL (PRIMARY) HYPERTENSION: ICD-10-CM

## 2022-10-13 DIAGNOSIS — Z88.0 ALLERGY STATUS TO PENICILLIN: ICD-10-CM

## 2022-10-13 DIAGNOSIS — N40.0 BENIGN PROSTATIC HYPERPLASIA WITHOUT LOWER URINARY TRACT SYMPTOMS: ICD-10-CM

## 2022-10-13 DIAGNOSIS — E78.00 PURE HYPERCHOLESTEROLEMIA, UNSPECIFIED: ICD-10-CM

## 2022-10-13 LAB
ALBUMIN SERPL ELPH-MCNC: 4.3 G/DL — SIGNIFICANT CHANGE UP (ref 3.5–5.2)
ALP SERPL-CCNC: 60 U/L — SIGNIFICANT CHANGE UP (ref 30–115)
ALT FLD-CCNC: 9 U/L — SIGNIFICANT CHANGE UP (ref 0–41)
ANION GAP SERPL CALC-SCNC: 9 MMOL/L — SIGNIFICANT CHANGE UP (ref 7–14)
AST SERPL-CCNC: 14 U/L — SIGNIFICANT CHANGE UP (ref 0–41)
BASOPHILS # BLD AUTO: 0.02 K/UL — SIGNIFICANT CHANGE UP (ref 0–0.2)
BASOPHILS NFR BLD AUTO: 0.2 % — SIGNIFICANT CHANGE UP (ref 0–1)
BILIRUB SERPL-MCNC: 0.4 MG/DL — SIGNIFICANT CHANGE UP (ref 0.2–1.2)
BUN SERPL-MCNC: 14 MG/DL — SIGNIFICANT CHANGE UP (ref 10–20)
CALCIUM SERPL-MCNC: 8.6 MG/DL — SIGNIFICANT CHANGE UP (ref 8.4–10.5)
CHLORIDE SERPL-SCNC: 104 MMOL/L — SIGNIFICANT CHANGE UP (ref 98–110)
CO2 SERPL-SCNC: 26 MMOL/L — SIGNIFICANT CHANGE UP (ref 17–32)
CREAT SERPL-MCNC: 0.7 MG/DL — SIGNIFICANT CHANGE UP (ref 0.7–1.5)
EGFR: 102 ML/MIN/1.73M2 — SIGNIFICANT CHANGE UP
EOSINOPHIL # BLD AUTO: 0.11 K/UL — SIGNIFICANT CHANGE UP (ref 0–0.7)
EOSINOPHIL NFR BLD AUTO: 1.3 % — SIGNIFICANT CHANGE UP (ref 0–8)
GLUCOSE SERPL-MCNC: 93 MG/DL — SIGNIFICANT CHANGE UP (ref 70–99)
HCT VFR BLD CALC: 41.5 % — LOW (ref 42–52)
HGB BLD-MCNC: 14.1 G/DL — SIGNIFICANT CHANGE UP (ref 14–18)
IMM GRANULOCYTES NFR BLD AUTO: 0.4 % — HIGH (ref 0.1–0.3)
LIDOCAIN IGE QN: 15 U/L — SIGNIFICANT CHANGE UP (ref 7–60)
LYMPHOCYTES # BLD AUTO: 2.14 K/UL — SIGNIFICANT CHANGE UP (ref 1.2–3.4)
LYMPHOCYTES # BLD AUTO: 25.2 % — SIGNIFICANT CHANGE UP (ref 20.5–51.1)
MCHC RBC-ENTMCNC: 31.3 PG — HIGH (ref 27–31)
MCHC RBC-ENTMCNC: 34 G/DL — SIGNIFICANT CHANGE UP (ref 32–37)
MCV RBC AUTO: 92 FL — SIGNIFICANT CHANGE UP (ref 80–94)
MONOCYTES # BLD AUTO: 0.72 K/UL — HIGH (ref 0.1–0.6)
MONOCYTES NFR BLD AUTO: 8.5 % — SIGNIFICANT CHANGE UP (ref 1.7–9.3)
NEUTROPHILS # BLD AUTO: 5.48 K/UL — SIGNIFICANT CHANGE UP (ref 1.4–6.5)
NEUTROPHILS NFR BLD AUTO: 64.4 % — SIGNIFICANT CHANGE UP (ref 42.2–75.2)
NRBC # BLD: 0 /100 WBCS — SIGNIFICANT CHANGE UP (ref 0–0)
PLATELET # BLD AUTO: 208 K/UL — SIGNIFICANT CHANGE UP (ref 130–400)
POTASSIUM SERPL-MCNC: 4.1 MMOL/L — SIGNIFICANT CHANGE UP (ref 3.5–5)
POTASSIUM SERPL-SCNC: 4.1 MMOL/L — SIGNIFICANT CHANGE UP (ref 3.5–5)
PROT SERPL-MCNC: 6.8 G/DL — SIGNIFICANT CHANGE UP (ref 6–8)
RBC # BLD: 4.51 M/UL — LOW (ref 4.7–6.1)
RBC # FLD: 13.3 % — SIGNIFICANT CHANGE UP (ref 11.5–14.5)
SODIUM SERPL-SCNC: 139 MMOL/L — SIGNIFICANT CHANGE UP (ref 135–146)
WBC # BLD: 8.5 K/UL — SIGNIFICANT CHANGE UP (ref 4.8–10.8)
WBC # FLD AUTO: 8.5 K/UL — SIGNIFICANT CHANGE UP (ref 4.8–10.8)

## 2022-10-13 PROCEDURE — 99284 EMERGENCY DEPT VISIT MOD MDM: CPT | Mod: FS

## 2022-10-13 RX ORDER — SODIUM CHLORIDE 9 MG/ML
1000 INJECTION INTRAMUSCULAR; INTRAVENOUS; SUBCUTANEOUS ONCE
Refills: 0 | Status: COMPLETED | OUTPATIENT
Start: 2022-10-13 | End: 2022-10-13

## 2022-10-13 RX ADMIN — SODIUM CHLORIDE 1000 MILLILITER(S): 9 INJECTION INTRAMUSCULAR; INTRAVENOUS; SUBCUTANEOUS at 21:31

## 2022-10-14 VITALS
HEART RATE: 74 BPM | OXYGEN SATURATION: 96 % | TEMPERATURE: 96 F | RESPIRATION RATE: 19 BRPM | SYSTOLIC BLOOD PRESSURE: 163 MMHG | DIASTOLIC BLOOD PRESSURE: 80 MMHG

## 2022-10-14 LAB
APPEARANCE UR: ABNORMAL
BACTERIA # UR AUTO: NEGATIVE — SIGNIFICANT CHANGE UP
BILIRUB UR-MCNC: NEGATIVE — SIGNIFICANT CHANGE UP
COLOR SPEC: ABNORMAL
DIFF PNL FLD: ABNORMAL
EPI CELLS # UR: 1 /HPF — SIGNIFICANT CHANGE UP (ref 0–5)
GLUCOSE UR QL: NEGATIVE — SIGNIFICANT CHANGE UP
HYALINE CASTS # UR AUTO: 2 /LPF — SIGNIFICANT CHANGE UP (ref 0–7)
KETONES UR-MCNC: ABNORMAL
LEUKOCYTE ESTERASE UR-ACNC: NEGATIVE — SIGNIFICANT CHANGE UP
NITRITE UR-MCNC: NEGATIVE — SIGNIFICANT CHANGE UP
PH UR: 6 — SIGNIFICANT CHANGE UP (ref 5–8)
PROT UR-MCNC: ABNORMAL
RBC CASTS # UR COMP ASSIST: 284 /HPF — HIGH (ref 0–4)
SP GR SPEC: 1.02 — SIGNIFICANT CHANGE UP (ref 1.01–1.03)
UROBILINOGEN FLD QL: SIGNIFICANT CHANGE UP
WBC UR QL: 4 /HPF — SIGNIFICANT CHANGE UP (ref 0–5)

## 2022-10-14 PROCEDURE — 74177 CT ABD & PELVIS W/CONTRAST: CPT | Mod: 26,MA

## 2022-10-14 NOTE — ED PROVIDER NOTE - NSFOLLOWUPINSTRUCTIONS_ED_ALL_ED_FT
Hematuria    Hematuria is blood in your urine. It can be caused by a bladder infection, kidney infection, prostate infection, kidney stone, or cancer of your urinary tract. Infections can usually be treated with medicine, and a kidney stone usually will pass through your urine. If neither of these is the cause of your hematuria, further workup to find out the reason may be needed.    It is very important that you tell your health care provider about any blood you see in your urine, even if the blood stops without treatment or happens without causing pain. Blood in your urine that happens and then stops and then happens again can be a symptom of a very serious condition. Also, pain is not a symptom in the initial stages of many urinary cancers.     HOME CARE INSTRUCTIONS  Drink lots of fluid, 3–4 quarts a day. If you have been diagnosed with an infection, cranberry juice is especially recommended, in addition to large amounts of water.  Avoid caffeine, tea, and carbonated beverages because they tend to irritate the bladder.  Avoid alcohol because it may irritate the prostate.  Take all medicines as directed by your health care provider.   If you were prescribed an antibiotic medicine, finish it all even if you start to feel better.  If you have been diagnosed with a kidney stone, follow your health care provider's instructions regarding straining your urine to catch the stone.   Empty your bladder often. Avoid holding urine for long periods of time.  After a bowel movement, women should cleanse front to back. Use each tissue only once.  Empty your bladder before and after sexual intercourse if you are a female.     SEEK MEDICAL CARE IF:  You develop back pain.  You have a fever.  You have a feeling of sickness in your stomach (nausea) or vomiting.  Your symptoms are not better in 3 days. Return sooner if you are getting worse.     SEEK IMMEDIATE MEDICAL CARE IF:  You develop severe vomiting and are unable to keep the medicine down.  You develop severe back or abdominal pain despite taking your medicines.  You begin passing a large amount of blood or clots in your urine.  You feel extremely weak or faint, or you pass out.    MAKE SURE YOU:  Understand these instructions.   Will watch your condition.  Will get help right away if you are not doing well or get worse.  Kidney Stones    Kidney stones (urolithiasis) are deposits that form inside your kidneys. The intense pain is caused by the stone moving through the urinary tract. When the stone moves, the ureter goes into spasm around the stone. The stone is usually passed in the urine. Symptoms include abdominal, side, or back pain, nausea, vomiting, blood in the urine, frequency with urination. Drink enough water and fluids to keep your urine clear or pale yellow. This will help you to pass the stone or stone fragments.    SEEK IMMEDIATE MEDICAL CARE IF YOU HAVE THE FOLLOWING SYMPTOMS: pain not controlled with medication, fever/chills, worsening vomiting, inability to urinate, or dizziness/lightheadedness.

## 2022-10-14 NOTE — ED PROVIDER NOTE - PATIENT PORTAL LINK FT
You can access the FollowMyHealth Patient Portal offered by [facility] by registering at the following website: [email or web address]. By joining [email or web address]’s FollowMyHealth portal, you will also be able to view your health information using other applications (apps) compatible with our system.

## 2022-10-14 NOTE — ED ADULT NURSE NOTE - IS THE PATIENT ABLE TO BE SCREENED?
GDM Class      . OB F/U.  + fetal movement  Denies any VB, LO or UC's  Phone # [phone]  Pharmacy confirmed  Diabetic supplies: needs metformin refill   Labs pending    Yes

## 2022-10-14 NOTE — ED PROVIDER NOTE - PHYSICAL EXAMINATION
CONSTITUTIONAL: Well-appearing; well-nourished; in no apparent distress.   NECK: Supple; non-tender; no cervical lymphadenopathy.   CARDIOVASCULAR: Normal S1, S2; no murmurs, rubs, or gallops.   RESPIRATORY: Normal chest excursion with respiration; breath sounds clear and equal bilaterally; no wheezes, rhonchi, or rales.  GI/: Normal bowel sounds; non-distended; non-tender; no palpable organomegaly.   MS: No evidence of trauma or deformity. No CVA tenderness. Normal ROM in all four extremities; non-tender to palpation; distal pulses are normal.   SKIN: Normal for age and race; warm; dry; good turgor; no apparent lesions or exudate.   NEURO/PSYCH: A & O x 4; grossly unremarkable. mood and manner are appropriate.

## 2022-10-14 NOTE — ED PROVIDER NOTE - PROGRESS NOTE DETAILS
JR: pt endorsed to Dr. Simms- plan f/u CT, UA and reassess. pt comfortable dc to f/u with urology outpt

## 2022-10-14 NOTE — ED ADULT NURSE NOTE - OBJECTIVE STATEMENT
pt BIB EMS from [facility] c/o hematuria since this afternoon and saw a dark stone come out after

## 2022-10-14 NOTE — ED PROVIDER NOTE - NS ED ATTENDING STATEMENT MOD
This was a shared visit with the CASSANDRA. I reviewed and verified the documentation and independently performed the documented:

## 2022-10-14 NOTE — ED ADULT NURSE NOTE - NSIMPLEMENTINTERV_GEN_ALL_ED
Implemented All Fall with Harm Risk Interventions:  [place] to call system. Call bell, personal items and telephone within reach. Instruct patient to call for assistance. Room bathroom lighting operational. Non-slip footwear when patient is off stretcher. Physically safe environment: no spills, clutter or unnecessary equipment. Stretcher in lowest position, wheels locked, appropriate side rails in place. Provide visual cue, wrist band, yellow gown, etc. Monitor gait and stability. Monitor for mental status changes and reorient to person, place, and time. Review medications for side effects contributing to fall risk. Reinforce activity limits and safety measures with patient and family. Provide visual clues: red socks.

## 2022-10-14 NOTE — ED PROVIDER NOTE - ATTENDING APP SHARED VISIT CONTRIBUTION OF CARE
67-year-old male with past medical history of CVA without residual deficits presents to the ER for dark gross hematuria today and possibly passing a stone at 4 PM.  Denies fever, vomiting, decreased urinary output reports some dribbling.    Vitals noted, triage note reviewed    Gen - NAD, Head - NCAT, Pharynx - clear, MMM, Heart - RRR, no m/g/r, Lungs - CTAB, no w/c/r, Abdomen - soft, NT, ND, Skin - No rash, Extremities - FROM, no edema, erythema, ecchymosis, brisk cap refill, Neuro - A&O x3, equal strength and sensation, non-focal exam    a/p:  gross hematuria  labs, imaging, ivf  reassess

## 2022-10-14 NOTE — ED PROVIDER NOTE - OBJECTIVE STATEMENT
No pt with pmhx HTN, HLD, BPH presents to ED c/o small episode of hematuria tonight. Denies fever/chill/HA/dizziness/chest pain/palpitation/sob/abd pain/n/v/d/ black stool/bloody stool

## 2022-10-14 NOTE — ED PROVIDER NOTE - NS ED MD DISPO DISCHARGE
Patient Education    BRIGHT [employer] Home

## 2022-10-14 NOTE — ED PROVIDER NOTE - NS ED ROS FT
Constitutional: no fever, chills, no recent weight loss, change in appetite or malaise  Eyes: no redness/discharge/pain/vision changes  ENT: no rhinorrhea/ear pain/sore throat  Cardiac: No chest pain, SOB or edema.  Respiratory: No cough or respiratory distress  GI: No nausea, vomiting, diarrhea or abdominal pain.  : No dysuria, frequency, urgency   MS: no pain to back or extremities, no loss of ROM, no weakness  Neuro: No headache or weakness. No LOC.  Skin: No skin rash.  Endocrine: No history of thyroid disease or diabetes.  Except as documented in the HPI, all other systems are negative.

## 2022-10-14 NOTE — ED PROVIDER NOTE - NSICDXPASTMEDICALHX_GEN_ALL_CORE_FT
PAST MEDICAL HISTORY:  Cellulitis     Charcot ankle, right     Enlarged prostate BPH    HTN (hypertension)     Hypercholesteremia

## 2022-10-15 LAB
CULTURE RESULTS: SIGNIFICANT CHANGE UP
SPECIMEN SOURCE: SIGNIFICANT CHANGE UP

## 2022-10-19 ENCOUNTER — OUTPATIENT (OUTPATIENT)
Dept: OUTPATIENT SERVICES | Facility: HOSPITAL | Age: 67
LOS: 1 days | Discharge: HOME | End: 2022-10-19

## 2022-10-24 ENCOUNTER — APPOINTMENT (OUTPATIENT)
Dept: PODIATRY | Facility: CLINIC | Age: 67
End: 2022-10-24

## 2022-10-24 ENCOUNTER — OUTPATIENT (OUTPATIENT)
Dept: OUTPATIENT SERVICES | Facility: HOSPITAL | Age: 67
LOS: 1 days | Discharge: HOME | End: 2022-10-24

## 2022-10-24 PROCEDURE — 11721 DEBRIDE NAIL 6 OR MORE: CPT | Mod: GC

## 2022-10-25 DIAGNOSIS — B35.1 TINEA UNGUIUM: ICD-10-CM

## 2022-10-25 DIAGNOSIS — M79.671 PAIN IN RIGHT FOOT: ICD-10-CM

## 2022-10-25 DIAGNOSIS — M79.672 PAIN IN LEFT FOOT: ICD-10-CM

## 2022-10-25 NOTE — PHYSICAL EXAM
[General Appearance - Alert] : alert [General Appearance - In No Acute Distress] : in no acute distress [General Appearance - Well Nourished] : well nourished [General Appearance - Well Developed] : well developed [Ankle Swelling Bilaterally] : bilaterally  [Delayed in the Right Toes] : capillary refills delayed in the right toes [Delayed in the Left Toes] : capillary refills delayed in the left toes [1+] : left foot dorsalis pedis 1+ [] : normal strength/tone [Skin Induration] : skin induration [Sensation] : the sensory exam was normal to light touch and pinprick [Oriented To Time, Place, And Person] : oriented to person, place, and time [Ankle Swelling (On Exam)] : not present [Varicose Veins Of Lower Extremities] : not present [de-identified] : Midfoot collapse, Right  [Foot Ulcer] : no foot ulcer [FreeTextEntry1] : Severe dystrophic nails x 10\par Hyperpigmentation noted to B/L LE from healed venous stasis ulcers \par Callus, Right hallux medial IPJ\par Skin thin & atrophic, b/l

## 2023-01-24 ENCOUNTER — OUTPATIENT (OUTPATIENT)
Dept: OUTPATIENT SERVICES | Facility: HOSPITAL | Age: 68
LOS: 1 days | Discharge: HOME | End: 2023-01-24

## 2023-01-24 ENCOUNTER — APPOINTMENT (OUTPATIENT)
Dept: PODIATRY | Facility: CLINIC | Age: 68
End: 2023-01-24
Payer: MEDICARE

## 2023-01-24 PROCEDURE — 11721 DEBRIDE NAIL 6 OR MORE: CPT

## 2023-01-24 NOTE — ASSESSMENT
[Verbal] : verbal [Patient] : patient [Good - alert, interested, motivated] : Good - alert, interested, motivated [Demonstrates independently] : demonstrates independently [FreeTextEntry1] : \par Plan:\par -Pt seen and evaluated\par -Mycotic nails debrided x 10\par -Pt. RTC 2 months

## 2023-01-24 NOTE — PHYSICAL EXAM
[General Appearance - Alert] : alert [General Appearance - In No Acute Distress] : in no acute distress [General Appearance - Well Nourished] : well nourished [General Appearance - Well Developed] : well developed [Ankle Swelling Bilaterally] : bilaterally  [Delayed in the Right Toes] : capillary refills delayed in the right toes [Delayed in the Left Toes] : capillary refills delayed in the left toes [1+] : left foot dorsalis pedis 1+ [] : normal strength/tone [Skin Induration] : skin induration [Sensation] : the sensory exam was normal to light touch and pinprick [Oriented To Time, Place, And Person] : oriented to person, place, and time [Ankle Swelling (On Exam)] : not present [Varicose Veins Of Lower Extremities] : not present [de-identified] : Midfoot collapse, Right  [Foot Ulcer] : no foot ulcer [FreeTextEntry1] : Severe dystrophic nails x 10\par Hyperpigmentation noted to B/L LE from healed venous stasis ulcers \par Callus, Right hallux medial IPJ\par Skin thin & atrophic, b/l

## 2023-01-25 ENCOUNTER — EMERGENCY (EMERGENCY)
Facility: HOSPITAL | Age: 68
LOS: 0 days | Discharge: HOME | End: 2023-01-25
Attending: EMERGENCY MEDICINE | Admitting: EMERGENCY MEDICINE
Payer: MEDICARE

## 2023-01-25 VITALS
TEMPERATURE: 97 F | SYSTOLIC BLOOD PRESSURE: 142 MMHG | DIASTOLIC BLOOD PRESSURE: 70 MMHG | OXYGEN SATURATION: 100 % | HEART RATE: 80 BPM | RESPIRATION RATE: 18 BRPM

## 2023-01-25 PROCEDURE — 99284 EMERGENCY DEPT VISIT MOD MDM: CPT | Mod: FS

## 2023-01-25 PROCEDURE — 70450 CT HEAD/BRAIN W/O DYE: CPT | Mod: 26,MA

## 2023-01-25 NOTE — ED ADULT NURSE NOTE - NSIMPLEMENTINTERV_GEN_ALL_ED
Implemented All Universal Safety Interventions:  [place] to call system. Call bell, personal items and telephone within reach. Instruct patient to call for assistance. Room bathroom lighting operational. Non-slip footwear when patient is off stretcher. Physically safe environment: no spills, clutter or unnecessary equipment. Stretcher in lowest position, wheels locked, appropriate side rails in place.

## 2023-01-25 NOTE — ED PROVIDER NOTE - PHYSICAL EXAMINATION
Gen: NAD, AOx3  Head: NCAT  HEENT: PERRL, oral mucosa moist, normal conjunctiva, oropharynx clear without exudate or erythema  Lung: CTAB, no respiratory distress, no wheezing, rales, rhonchi  CV: normal s1/s2, rrr, Normal perfusion, pulses 2+ throughout  Abd: soft, NTND, no CVA tenderness  Genitourinary: no pelvic tenderness  MSK: No edema, no visible deformities, full range of motion in all 4 extremities, no spinal tenderness   Neuro: CN II-XII grossly intact, No focal neurologic deficits, no nystagmus/pronator drift, coordination/sensation intact, strength 5/5 BUE/BLE  Skin: No rash   Psych: normal affect

## 2023-01-25 NOTE — ED PROVIDER NOTE - NSFOLLOWUPINSTRUCTIONS_ED_ALL_ED_FT
Please follow up with your primary care physician within 24-72 hours and return immediately if symptoms worsen.      Head Injury    AMBULATORY CARE:    A head injury can include your scalp, face, skull, or brain and range from mild to severe. Effects can appear immediately after the injury or develop later. The effects may last a short time or be permanent. Healthcare providers may want to check your recovery over time. Treatment may change as you recover or develop new health problems from the head injury.    Common signs and symptoms:   •An open scalp or skin wound, swelling, or bruising      •Mild to moderate headache      •Dizziness or loss of balance      •Nausea or vomiting      •Ringing in the ears or neck pain      •Confusion, especially right after the injury      •Change in mood, such as feeling restless or irritable      •Trouble thinking, remembering, or concentrating      •Drowsiness or decreased amount of energy      •Trouble sleeping      Call your local emergency number (911 in the ), or have someone else call if:   •You cannot be woken.      •You have a seizure.      •You stop responding to others or you faint.      •You have blurry or double vision.      •Your speech becomes slurred or confused.      •You have arm or leg weakness, loss of feeling, or new problems with coordination.      •Your pupils are larger than usual, or one pupil is a different size than the other.      •You have blood or clear fluid coming out of your ears or nose.      Seek care immediately if:   •You have repeated or forceful vomiting.      •You feel confused.      •Your headache gets worse or becomes severe.      •You or someone caring for you notices that you are harder to wake than usual.      Call your doctor if:   •Your symptoms last longer than 6 weeks after the injury.      •You have questions or concerns about your condition or care.      Medicines:   •Acetaminophen decreases pain and fever. It is available without a doctor's order. Ask how much to take and how often to take it. Follow directions. Read the labels of all other medicines you are using to see if they also contain acetaminophen, or ask your doctor or pharmacist. Acetaminophen can cause liver damage if not taken correctly. Do not use more than 4 grams (4,000 milligrams) total of acetaminophen in one day.       •Take your medicine as directed. Contact your healthcare provider if you think your medicine is not helping or if you have side effects. Tell him or her if you are allergic to any medicine. Keep a list of the medicines, vitamins, and herbs you take. Include the amounts, and when and why you take them. Bring the list or the pill bottles to follow-up visits. Carry your medicine list with you in case of an emergency.      Self-care:   •Rest or do quiet activities. Limit your time watching TV, using the computer, or doing tasks that require a lot of thinking. Slowly return to your normal activities as directed. Do not play sports or do activities that may cause you to get hit in the head. Ask your healthcare provider when you can return to sports.      •Apply ice on your head for 15 to 20 minutes every hour or as directed. Use an ice pack, or put crushed ice in a plastic bag. Cover it with a towel before you apply it to your skin. Ice helps prevent tissue damage and decreases swelling and pain.      •Have someone stay with you for 24 hours , or as directed. This person can monitor you for problems and call for help if needed. When you are awake, the person should ask you a few questions every few hours to see if you are thinking clearly. An example is to ask your name or address.      Prevent another head injury:   •Wear a helmet that fits properly. Do this when you play sports, or ride a bike, scooter, or skateboard. Helmets help decrease your risk for a serious head injury. Talk to your healthcare provider about other ways you can protect yourself if you play sports.      •Wear your seatbelt every time you are in a car. This helps lower your risk for a head injury if you are in a car accident.      Follow up with your doctor as directed: Write down your questions so you remember to ask them during your visits.       © Copyright [employer] 2021

## 2023-01-25 NOTE — ED PROVIDER NOTE - CLINICAL SUMMARY MEDICAL DECISION MAKING FREE TEXT BOX
Patient with head trauma on aspirin, CT head is negative, patient to be discharged back to assisted living.  Patient is requesting an ambulance so he does not have to jump off the back of the ambulance.  Pt was given strict return to ED precautions and pt indicated that he/she understood.

## 2023-01-25 NOTE — ED ADULT TRIAGE NOTE - CHIEF COMPLAINT QUOTE
bibems after "bumping head on a wall". No fall. Patient only on aspirin, denies any symptoms, no visible injuries

## 2023-01-25 NOTE — ED PROVIDER NOTE - OBJECTIVE STATEMENT
68 yo male with a pmh of bph presents after a head injury. pt states that he tripped and hit his head on the wall. pt denies any LOC. pt denies falling. pt states to have point tenderness to the L parietal region where he hit his head. pt denies any other symptoms including fevers, chill, headache, recent illness/travel, cough, abdominal pain, chest pain, or SOB.

## 2023-01-25 NOTE — ED PROVIDER NOTE - PATIENT PORTAL LINK FT
You can access the FollowMyHealth Patient Portal offered by [facility] by registering at the following website: [email or web address]. By joining [employer]’s FollowMyHealth portal, you will also be able to view your health information using other applications (apps) compatible with our system.

## 2023-01-25 NOTE — ED PROVIDER NOTE - ATTENDING APP SHARED VISIT CONTRIBUTION OF CARE
67-year-old male with past medical history of hypertension, BPH, cellulitis, hypercholesterolemia, on aspirin, sent by assisted living for head trauma.  Patient said he slipped and hit the left back of his head on a wall.  Denies any LOC.  Patient denies any neck pain.  Patient has any chest pain or abdominal pain.  Patient has no change in mental status.  Exam: nad, ncat, perrl, eomi, mmm, rrr, ctab, abd soft, nt, nd aox3, no midline C-spine tenderness impression: Patient with head trauma on aspirin, will CT head

## 2023-01-27 DIAGNOSIS — I10 ESSENTIAL (PRIMARY) HYPERTENSION: ICD-10-CM

## 2023-01-27 DIAGNOSIS — Z88.8 ALLERGY STATUS TO OTHER DRUGS, MEDICAMENTS AND BIOLOGICAL SUBSTANCES: ICD-10-CM

## 2023-01-27 DIAGNOSIS — S09.90XA UNSPECIFIED INJURY OF HEAD, INITIAL ENCOUNTER: ICD-10-CM

## 2023-01-27 DIAGNOSIS — E78.00 PURE HYPERCHOLESTEROLEMIA, UNSPECIFIED: ICD-10-CM

## 2023-01-27 DIAGNOSIS — Z87.2 PERSONAL HISTORY OF DISEASES OF THE SKIN AND SUBCUTANEOUS TISSUE: ICD-10-CM

## 2023-01-27 DIAGNOSIS — Z88.0 ALLERGY STATUS TO PENICILLIN: ICD-10-CM

## 2023-01-27 DIAGNOSIS — Z88.1 ALLERGY STATUS TO OTHER ANTIBIOTIC AGENTS STATUS: ICD-10-CM

## 2023-01-27 DIAGNOSIS — Z79.82 LONG TERM (CURRENT) USE OF ASPIRIN: ICD-10-CM
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
## 2023-01-27 DIAGNOSIS — N40.0 BENIGN PROSTATIC HYPERPLASIA WITHOUT LOWER URINARY TRACT SYMPTOMS: ICD-10-CM
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
## 2023-01-30 DIAGNOSIS — B35.1 TINEA UNGUIUM: ICD-10-CM

## 2023-01-30 DIAGNOSIS — M79.672 PAIN IN LEFT FOOT: ICD-10-CM

## 2023-01-30 DIAGNOSIS — M79.671 PAIN IN RIGHT FOOT: ICD-10-CM

## 2023-03-28 ENCOUNTER — APPOINTMENT (OUTPATIENT)
Dept: PODIATRY | Facility: CLINIC | Age: 68
End: 2023-03-28

## 2023-03-28 ENCOUNTER — OUTPATIENT (OUTPATIENT)
Dept: OUTPATIENT SERVICES | Facility: HOSPITAL | Age: 68
LOS: 1 days | End: 2023-03-28
Payer: MEDICARE

## 2023-03-28 ENCOUNTER — APPOINTMENT (OUTPATIENT)
Dept: PODIATRY | Facility: CLINIC | Age: 68
End: 2023-03-28
Payer: MEDICARE

## 2023-03-28 DIAGNOSIS — Z00.00 ENCOUNTER FOR GENERAL ADULT MEDICAL EXAMINATION WITHOUT ABNORMAL FINDINGS: ICD-10-CM

## 2023-03-28 PROCEDURE — 11721 DEBRIDE NAIL 6 OR MORE: CPT

## 2023-03-29 NOTE — PHYSICAL EXAM
[General Appearance - Alert] : alert [General Appearance - In No Acute Distress] : in no acute distress [General Appearance - Well Nourished] : well nourished [General Appearance - Well Developed] : well developed [Ankle Swelling Bilaterally] : bilaterally  [Delayed in the Right Toes] : capillary refills delayed in the right toes [Delayed in the Left Toes] : capillary refills delayed in the left toes [1+] : left foot dorsalis pedis 1+ [] : normal strength/tone [Skin Induration] : skin induration [Sensation] : the sensory exam was normal to light touch and pinprick [Oriented To Time, Place, And Person] : oriented to person, place, and time [Ankle Swelling (On Exam)] : not present [Varicose Veins Of Lower Extremities] : not present [de-identified] : Midfoot collapse, Right  [Foot Ulcer] : no foot ulcer [FreeTextEntry1] : Severe dystrophic nails x 10\par Hyperpigmentation noted to B/L LE from healed venous stasis ulcers \par Callus, Right hallux medial IPJ\par Skin thin & atrophic, b/l

## 2023-03-29 NOTE — HISTORY OF PRESENT ILLNESS
[Diabetic Shoes] : diabetic shoes [Walker] : a walker [FreeTextEntry1] : CC: "Long nails"\par HPI:\par -68 y/o male returns for debridement of elongated toenails and calluses that are painful on ambulation. \par -Pt. denies any other pedal complaints at this time.\par \par

## 2023-03-30 DIAGNOSIS — B35.1 TINEA UNGUIUM: ICD-10-CM

## 2023-04-03 NOTE — ASU DISCHARGE PLAN (ADULT/PEDIATRIC). - PATIENT BELONGING
Patient: Dee Herrera    Procedure Summary     Date: 04/03/23 Room / Location: [facility] OR [address] [place] [address] MAIN OR    Anesthesia Start: 1315 Anesthesia Stop: 1432    Procedure: INSERTION PERITONEAL DIALYSIS CATHETER LAPAROSCOPIC, omentumpexy (Abdomen) Diagnosis:       ESRD on hemodialysis      (ESRD on hemodialysis (HCC) [N18.6, Z99.2])    Surgeons: Jemal Loyola MD Provider: Norm Jimenes MD    Anesthesia Type: general ASA Status: 3          Anesthesia Type: general    Vitals  Vitals Value Taken Time   /106 04/03/23 1501   Temp 37.1 °C (98.8 °F) 04/03/23 1426   Pulse 82 04/03/23 1513   Resp 16 04/03/23 1500   SpO2 95 % 04/03/23 1513   Vitals shown include unvalidated device data.        Post Anesthesia Care and Evaluation    Patient location during evaluation: PACU  Patient participation: complete - patient participated  Level of consciousness: awake and alert  Pain management: adequate    Airway patency: patent  Anesthetic complications: No anesthetic complications    Cardiovascular status: acceptable  Respiratory status: acceptable  Hydration status: acceptable    Comments: ---------------------            04/03/23                1500      ---------------------   BP:      145/98   Pulse:      83        Resp:       16        Temp:                 SpO2:       95%      ---------------------       patient's belongings returned

## 2023-05-30 ENCOUNTER — APPOINTMENT (OUTPATIENT)
Dept: PODIATRY | Facility: CLINIC | Age: 68
End: 2023-05-30
Payer: MEDICARE

## 2023-05-30 ENCOUNTER — OUTPATIENT (OUTPATIENT)
Dept: OUTPATIENT SERVICES | Facility: HOSPITAL | Age: 68
LOS: 1 days | End: 2023-05-30
Payer: MEDICARE

## 2023-05-30 DIAGNOSIS — Z00.00 ENCOUNTER FOR GENERAL ADULT MEDICAL EXAMINATION WITHOUT ABNORMAL FINDINGS: ICD-10-CM

## 2023-05-30 PROCEDURE — 11721 DEBRIDE NAIL 6 OR MORE: CPT

## 2023-05-30 PROCEDURE — 11721 DEBRIDE NAIL 6 OR MORE: CPT | Mod: 50

## 2023-05-31 NOTE — PHYSICAL EXAM
[General Appearance - Alert] : alert [General Appearance - In No Acute Distress] : in no acute distress [General Appearance - Well Nourished] : well nourished [General Appearance - Well Developed] : well developed [Ankle Swelling Bilaterally] : bilaterally  [Delayed in the Right Toes] : capillary refills delayed in the right toes [Delayed in the Left Toes] : capillary refills delayed in the left toes [1+] : left foot dorsalis pedis 1+ [] : normal strength/tone [Skin Induration] : skin induration [Sensation] : the sensory exam was normal to light touch and pinprick [Oriented To Time, Place, And Person] : oriented to person, place, and time [Ankle Swelling (On Exam)] : not present [Varicose Veins Of Lower Extremities] : not present [de-identified] : Midfoot collapse, Right  [Foot Ulcer] : no foot ulcer [FreeTextEntry1] : Severe dystrophic nails x 10\par Hyperpigmentation noted to B/L LE from healed venous stasis ulcers \par Callus, Right hallux medial IPJ\par Skin thin & atrophic, b/l

## 2023-06-01 DIAGNOSIS — R26.2 DIFFICULTY IN WALKING, NOT ELSEWHERE CLASSIFIED: ICD-10-CM

## 2023-06-01 DIAGNOSIS — B35.1 TINEA UNGUIUM: ICD-10-CM

## 2023-08-01 ENCOUNTER — APPOINTMENT (OUTPATIENT)
Dept: PODIATRY | Facility: CLINIC | Age: 68
End: 2023-08-01

## 2023-08-13 ENCOUNTER — EMERGENCY (EMERGENCY)
Facility: HOSPITAL | Age: 68
LOS: 0 days | Discharge: HOME | End: 2023-08-13
Attending: EMERGENCY MEDICINE
Payer: MEDICARE

## 2023-08-13 VITALS
TEMPERATURE: 98 F | SYSTOLIC BLOOD PRESSURE: 140 MMHG | RESPIRATION RATE: 20 BRPM | HEART RATE: 82 BPM | WEIGHT: 250 LBS | OXYGEN SATURATION: 94 % | DIASTOLIC BLOOD PRESSURE: 64 MMHG

## 2023-08-13 DIAGNOSIS — Z23 ENCOUNTER FOR IMMUNIZATION: ICD-10-CM

## 2023-08-13 DIAGNOSIS — Z88.1 ALLERGY STATUS TO OTHER ANTIBIOTIC AGENTS STATUS: ICD-10-CM
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
## 2023-08-13 DIAGNOSIS — S01.01XA LACERATION WITHOUT FOREIGN BODY OF SCALP, INITIAL ENCOUNTER: ICD-10-CM
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
## 2023-08-13 DIAGNOSIS — Z86.718 PERSONAL HISTORY OF OTHER VENOUS THROMBOSIS AND EMBOLISM: ICD-10-CM

## 2023-08-13 DIAGNOSIS — N40.0 BENIGN PROSTATIC HYPERPLASIA WITHOUT LOWER URINARY TRACT SYMPTOMS: ICD-10-CM

## 2023-08-13 DIAGNOSIS — S09.90XA UNSPECIFIED INJURY OF HEAD, INITIAL ENCOUNTER: ICD-10-CM

## 2023-08-13 DIAGNOSIS — Z88.0 ALLERGY STATUS TO PENICILLIN: ICD-10-CM

## 2023-08-13 DIAGNOSIS — Z79.82 LONG TERM (CURRENT) USE OF ASPIRIN: ICD-10-CM

## 2023-08-13 DIAGNOSIS — E78.00 PURE HYPERCHOLESTEROLEMIA, UNSPECIFIED: ICD-10-CM

## 2023-08-13 DIAGNOSIS — S12.600A UNSPECIFIED DISPLACED FRACTURE OF SEVENTH CERVICAL VERTEBRA, INITIAL ENCOUNTER FOR CLOSED FRACTURE: ICD-10-CM

## 2023-08-13 DIAGNOSIS — Z88.8 ALLERGY STATUS TO OTHER DRUGS, MEDICAMENTS AND BIOLOGICAL SUBSTANCES: ICD-10-CM

## 2023-08-13 PROCEDURE — 72125 CT NECK SPINE W/O DYE: CPT | Mod: 26,MA

## 2023-08-13 PROCEDURE — 99283 EMERGENCY DEPT VISIT LOW MDM: CPT

## 2023-08-13 PROCEDURE — 12001 RPR S/N/AX/GEN/TRNK 2.5CM/<: CPT

## 2023-08-13 PROCEDURE — 90715 TDAP VACCINE 7 YRS/> IM: CPT

## 2023-08-13 PROCEDURE — 70450 CT HEAD/BRAIN W/O DYE: CPT | Mod: MA

## 2023-08-13 PROCEDURE — 99284 EMERGENCY DEPT VISIT MOD MDM: CPT | Mod: FS,25

## 2023-08-13 PROCEDURE — 72125 CT NECK SPINE W/O DYE: CPT | Mod: MA

## 2023-08-13 PROCEDURE — 99284 EMERGENCY DEPT VISIT MOD MDM: CPT

## 2023-08-13 PROCEDURE — 99284 EMERGENCY DEPT VISIT MOD MDM: CPT | Mod: 25

## 2023-08-13 PROCEDURE — 90471 IMMUNIZATION ADMIN: CPT

## 2023-08-13 PROCEDURE — 12035 INTMD RPR S/A/T/EXT 12.6-20: CPT

## 2023-08-13 PROCEDURE — 70450 CT HEAD/BRAIN W/O DYE: CPT | Mod: 26,MA

## 2023-08-13 RX ORDER — LIDOCAINE HCL 20 MG/ML
30 VIAL (ML) INJECTION ONCE
Refills: 0 | Status: COMPLETED | OUTPATIENT
Start: 2023-08-13 | End: 2023-08-13

## 2023-08-13 RX ORDER — TETANUS TOXOID, REDUCED DIPHTHERIA TOXOID AND ACELLULAR PERTUSSIS VACCINE, ADSORBED 5; 2.5; 8; 8; 2.5 [IU]/.5ML; [IU]/.5ML; UG/.5ML; UG/.5ML; UG/.5ML
0.5 SUSPENSION INTRAMUSCULAR ONCE
Refills: 0 | Status: COMPLETED | OUTPATIENT
Start: 2023-08-13 | End: 2023-08-13

## 2023-08-13 RX ADMIN — TETANUS TOXOID, REDUCED DIPHTHERIA TOXOID AND ACELLULAR PERTUSSIS VACCINE, ADSORBED 0.5 MILLILITER(S): 5; 2.5; 8; 8; 2.5 SUSPENSION INTRAMUSCULAR at 14:21

## 2023-08-13 NOTE — CONSULT NOTE ADULT - ATTENDING COMMENTS
I evaluated this patient at around 5 pm on 08/13/2023.    This is  67 y/o male w/ PMHx of HTN, BPH, HLD and right charcot's ankle seen as Trauma Consult s/p fall, +HT, -LOC, -AC. Trauma assessment in ED: ABCs intact , GCS 15 , AAOx3. Admits mild headache, denies pain elsewhere. As per patient, he was cleaning the bathroom and tripped, hitting the front of his head on the bathtub. External signs of trauma include a ~8cm vertical laceration to the frontal region of his head.    Primary and secondary surveys were performed.    AAO x3  GCS 15.  Neuro intact.  Scalp laceration in the middle of bilateral parietal areas, around 8 cm long.  Left scalp hematoma.  Chest: clear.  CV : RRR  Abdomen: soft, nontender  Extr: no acute deformities.    All images and labs were reviewed.    ASSESSMENT:  67 y/o male, S/P Fall.  Closed head injury.  Scalp laceration.  Scalp hematoma.  C7 Left Transverse Process Fracture.    PLAN:  Patient was observed over several hours and remained hemodynamically and neurologically stable.  He was consulted with Neurosurgery regarding C7 Left Transverse Process Fracture and was recommended soft collar only for comfort and f/u as outpatient.  Further disposition as per ED

## 2023-08-13 NOTE — ED PROVIDER NOTE - PHYSICAL EXAMINATION
Physical Exam    Constitutional: No acute distress.   Head: Traumatic vertical laceration frontal and middle scalp 20cm in length with some crushed/torn tissue with surrounding skin hematoma. Laceration involves only skin no underlying structures appear damaged. Mild active bleed, non pulsatile. No skull depression  Eyes: Conjunctiva pink, Sclera clear, PERRLA, EOMI.  ENT: No sinus tenderness. No nasal discharge. No oropharyngeal erythema, edema, or exudates. Uvula midline.   Cardiovascular: Regular rate, regular rhythm. No noted murmurs rubs or gallops.  Respiratory: unlabored respiratory effort, clear to auscultation bilaterally no wheezing, rales or rhonchi  Gastrointestinal: Normal bowel sounds. soft, non distended, non-tender abdomen.   Musculoskeletal: supple neck, no midline tenderness. Right ankle deformity charcots.   Integumentary: warm, dry, no rash  Neurologic: awake, alert, cranial nerves II-XII grossly intact, extremities’ motor and sensory functions grossly intact

## 2023-08-13 NOTE — ED PROVIDER NOTE - CLINICAL SUMMARY MEDICAL DECISION MAKING FREE TEXT BOX
68yM p/w head injury from apparently mechanical fall w/o LOC.  Pt w/ extensive lac to forehead and scalp, still oozing.  Lac repaired at bedside and tdap updated.  Imaging with C7 transverse process fracture.  Trauma and NSGY consulted and ok to dc with o/p f/u, supportive care, return precautions.

## 2023-08-13 NOTE — ED PROVIDER NOTE - CARE PLAN
Principal Discharge DX:	Scalp laceration   1 Principal Discharge DX:	Scalp laceration  Secondary Diagnosis:	Closed C7 fracture

## 2023-08-13 NOTE — ED PROVIDER NOTE - ATTENDING APP SHARED VISIT CONTRIBUTION OF CARE
68yM p/w head injury - fell in the bathroom when he reached down to  a bottle of lotion that had fallen.  No LOC but +massive scalp lac from mid-forehead extending past vertex of scalp.  +oozing.  Not on a/c.  Last tdap unknown/not UTD.

## 2023-08-13 NOTE — CONSULT NOTE ADULT - ASSESSMENT
ASSESSMENT:  68yM w/ PMHx of HTN, BPH, HLD and right charcot's ankle seen as Trauma Consult s/p fall, +HT, -LOC, -AC. Trauma assessment in ED: ABCs intact , GCS 15 , AAOx3. As per patient, he was in the bathroom and tripped, falling to the front of his head. External signs of trauma include a ~15-20cm vertical laceration to the frontal region of his head, now s/p repair by ED.     Injuries identified:   - Minimally displaced acute fracture of the C7 left transverse process.    PLAN:   - s/p scalp lac repair by ED  - No further traumatic workup warranted  - F/u Neurosurgery recs for transverse process fx    --------------------------------------------------------------------------------------  08-13-23 @ 16:25 ASSESSMENT:  68yM w/ PMHx of HTN, BPH, HLD and right charcot's ankle seen as Trauma Consult s/p fall, +HT, -LOC, -AC. Trauma assessment in ED: ABCs intact , GCS 15 , AAOx3. Admits mild headache, denies pain elsewhere. As per patient, he was cleaning the bathroom and tripped, hitting the front of his head on the bathtub. External signs of trauma include a ~10cm vertical laceration to the frontal region of his head, now s/p repair by ED.    Injuries identified:   - Minimally displaced acute fracture of the C7 left transverse process    PLAN:   - s/p scalp lac repair by ED  - F/u Neurosurgery recs for transverse process fx  - Plan discussed with Trauma attending, Dr. Godfrey    --------------------------------------------------------------------------------------  08-13-23 @ 16:25 ASSESSMENT:  68yM w/ PMHx of HTN, BPH, HLD and right charcot's ankle seen as Trauma Consult s/p fall, +HT, -LOC, -AC. Trauma assessment in ED: ABCs intact , GCS 15 , AAOx3. Admits mild headache, denies pain elsewhere. As per patient, he was cleaning the bathroom and tripped, hitting the front of his head on the bathtub. External signs of trauma include a ~10cm vertical laceration to the frontal region of his head, now s/p repair by ED.    Injuries identified:   - Minimally displaced acute fracture of the C7 left transverse process    PLAN:   - s/p scalp lac repair by ED  - NSGY: No intervention, f/u outpatient  - No further traumatic workup at this time  - Dispo as per ED  - Plan discussed with Trauma attending, Dr. Godfrey    --------------------------------------------------------------------------------------  08-13-23 @ 16:25

## 2023-08-13 NOTE — CONSULT NOTE ADULT - SUBJECTIVE AND OBJECTIVE BOX
HISTORY OF PRESENT ILLNESS:        68 year old male with a history of Venous Stasis, Charcot Joint R ankle, BPH presents to the ED with laceration to scalp. Patient states that his bathroom was redone and he tripped on the new floor landing on his head. He Denies LOC and is not on AC. Now has large head laceration in the middle of his scalp that has some active bleeding. He admits to mild headache but denies dizziness, vision changes, paresthesias, weakness, neck pain, nausea, vomiting, other MSK injury. Unsure of last tetanus      pt seen and examined at bedside pt is alert follows commands states he always has neck discomfort that he has no new pain       .PAST MEDICAL & SURGICAL HISTORY:  HTN (hypertension)      Enlarged prostate  BPH      Hypercholesteremia      Charcot ankle, right      Cellulitis        FAMILY HISTORY:      SOCIAL HISTORY:  Tobacco Use:  EtOH use:   Substance:    Allergies    Augmentin (Rash; Diarrhea; Hives)  penicillin (Unknown)  Vitamin D (Hives)    Intolerances        REVIEW OF SYSTEMS    MEDICATIONS:  Antibiotics:    Neuro:    Anticoagulation:    OTHER:    IVF:      Vital Signs Last 24 Hrs  T(C): 36.4 (13 Aug 2023 10:34), Max: 36.4 (13 Aug 2023 10:34)  T(F): 97.6 (13 Aug 2023 10:34), Max: 97.6 (13 Aug 2023 10:34)  HR: 82 (13 Aug 2023 10:34) (82 - 82)  BP: 140/64 (13 Aug 2023 10:34) (140/64 - 140/64)  BP(mean): --  RR: 20 (13 Aug 2023 10:34) (20 - 20)  SpO2: 94% (13 Aug 2023 10:34) (94% - 94%)    Parameters below as of 13 Aug 2023 10:34  Patient On (Oxygen Delivery Method): room air        PHYSICAL EXAM:      Alert, Follows commands   A&OX3, PERRL   EOM ( I  )  No facial asymmetry   MAEX4   MS bilateral UE's 5/5         bilateral LE's 5/5   neck no tenderness to palpation       RADIOLOGY & ADDITIONAL STUDIES:    < from: CT Head No Cont (08.13.23 @ 14:12) >  FINDINGS:  Superior left frontal scalp hematoma. No radiopaque foreign body. No   acute calvarial fracture.    There is no CT evidence of acute transcortical infarct. Age-related   involutional changes and chronic microvascular ischemic changes.    There is nohydrocephalus, mass effect, midline shift, or acute   intracranial hemorrhage. No extra-axial collection. Basal cisterns are   patent.    The visualized paranasal sinuses and mastoid air cells are clear.    The calvarium is intact.      < from: CT Cervical Spine No Cont (08.13.23 @ 14:15) >  CT CERVICAL SPINE:    TECHNIQUE:  Axial images were obtained through the cervical spine using   multislice helical technique.  Reformatted coronal and sagittal images   were subsequently obtained and reviewed.      < from: CT Cervical Spine No Cont (08.13.23 @ 14:15) >  FINDINGS:  Minimally displaced acute fracture of the C7 left transverse process.   There is no splaying of the spinous processes. The occipital condyles are   normal. Lateral masses of C1 align normally with C2. The atlantodental   and atlanto-occipital joints are maintained. There is no   spondylolisthesis. Facet joint alignments are maintained.    Multilevel degenerative osteoarthritis and degenerative disc disease are   present. Findings include marginal osteophytes, uncovertebral spurring,   loss of normal disc space height and endplate sclerosis, and facet joint   space compartment narrowing with subchondral sclerosis and hypertrophic   osteophytes at multiple levels. Canal contents are suboptimally evaluated   inherent to CT technique.        A/p             68 yr old male with hx of fall             head laceration             Minimally displaced Left C7 transverse process fx           No neurosurgical intervention needed           may follow up as an outpatient with Dr Mi

## 2023-08-13 NOTE — ED PROVIDER NOTE - NSFOLLOWUPINSTRUCTIONS_ED_ALL_ED_FT
Keep wound clean and dry for 24 hours. After that please dress with dry dressing once per day and wash with soap and water once per day. Please return to the ED if you have worsening pain, bleeding, redness, swelling, or other wound changes. Otherwise, return to the ED in 7 days for removal of staples and sutures.       Laceration    WHAT YOU NEED TO KNOW:    A laceration is an injury to the skin and the soft tissue underneath it. Lacerations happen when you are cut or hit by something. They can happen anywhere on the body.     DISCHARGE INSTRUCTIONS:    Return to the emergency department if:     You have heavy bleeding or bleeding that does not stop after 10 minutes of holding firm, direct pressure over the wound.       Your wound opens up.     Contact your healthcare provider if:     You have a fever or chills.       Your laceration is red, warm, or swollen.      You have red streaks on your skin coming from your wound.      You have white or yellow drainage from the wound that smells bad.      You have pain that gets worse, even after treatment.       You have questions or concerns about your condition or care.     Medicines:     Prescription pain medicine may be given. Ask how to take this medicine safely.       Antibiotics help treat or prevent a bacterial infection.       Take your medicine as directed. Contact your healthcare provider if you think your medicine is not helping or if you have side effects. Tell him or her if you are allergic to any medicine. Keep a list of the medicines, vitamins, and herbs you take. Include the amounts, and when and why you take them. Bring the list or the pill bottles to follow-up visits. Carry your medicine list with you in case of an emergency.    Care for your wound as directed:     Do not get your wound wet until your healthcare provider says it is okay. Do not soak your wound in water. Do not go swimming until your healthcare provider says it is okay. Carefully wash the wound with soap and water. Gently pat the area dry or allow it to air dry.       Change your bandages when they get wet, dirty, or after washing. Apply new, clean bandages as directed. Do not apply elastic bandages or tape too tight. Do not put powders or lotions over your incision.       Apply antibiotic ointment as directed. Your healthcare provider may give you antibiotic ointment to put over your wound if you have stitches. If you have strips of tape over your incision, let them dry up and fall off on their own. If they do not fall off within 14 days, gently remove them. If you have glue over your wound, do not remove or pick at it. If your glue comes off, do not replace it with glue that you have at home.       Check your wound every day for signs of infection such as swelling, redness, or pus.     Self-care:     Apply ice on your wound for 15 to 20 minutes every hour or as directed. Use an ice pack, or put crushed ice in a plastic bag. Cover it with a towel. Ice helps prevent tissue damage and decreases swelling and pain.      Use a splint as directed. A splint will decrease movement and stress on your wound. It may help it heal faster. A splint may be used for lacerations over joints or areas of your body that bend. Ask your healthcare provider how to apply and remove a splint.       Decrease scarring of your wound by applying ointments as directed. Do not apply ointments until your healthcare provider says it is okay. You may need to wait until your wound is healed. Ask which ointment to buy and how often to use it. After your wound is healed, use sunscreen over the area when you are out in the sun. You should do this for at least 6 months to 1 year after your injury.     Follow up with your healthcare provider as directed: You may need to follow up in 24 to 48 hours to have your wound checked for infection. You will need to return in 3 to 14 days if you have stitches or staples so they can be removed. Care for your wound as directed to prevent infection and help it heal. Write down your questions so you remember to ask them during your visits.       © Copyright [employer] 2019 All illustrations and images included in CareNotes are the copyrighted property of [employer] or [employer]. Keep wound clean and dry for 24 hours. After that please dress with dry dressing once per day and wash with soap and water once per day. Please return to the ED if you have worsening pain, bleeding, redness, swelling, or other wound changes. Otherwise, return to the ED in 7 days for removal of staples and sutures.     In regards to your Cervical Spine fracture, no acute intervention was recommended. If you develop severe neck pain, new weakness to your extremities, numbness, tingling, or other new/concerning symptoms please return to the ED. Otherwise, please schedule an appointment to follow up with Dr. Mi within 2 weeks.     Laceration    WHAT YOU NEED TO KNOW:    A laceration is an injury to the skin and the soft tissue underneath it. Lacerations happen when you are cut or hit by something. They can happen anywhere on the body.     DISCHARGE INSTRUCTIONS:    Return to the emergency department if:     You have heavy bleeding or bleeding that does not stop after 10 minutes of holding firm, direct pressure over the wound.       Your wound opens up.     Contact your healthcare provider if:     You have a fever or chills.       Your laceration is red, warm, or swollen.      You have red streaks on your skin coming from your wound.      You have white or yellow drainage from the wound that smells bad.      You have pain that gets worse, even after treatment.       You have questions or concerns about your condition or care.     Medicines:     Prescription pain medicine may be given. Ask how to take this medicine safely.       Antibiotics help treat or prevent a bacterial infection.       Take your medicine as directed. Contact your healthcare provider if you think your medicine is not helping or if you have side effects. Tell him or her if you are allergic to any medicine. Keep a list of the medicines, vitamins, and herbs you take. Include the amounts, and when and why you take them. Bring the list or the pill bottles to follow-up visits. Carry your medicine list with you in case of an emergency.    Care for your wound as directed:     Do not get your wound wet until your healthcare provider says it is okay. Do not soak your wound in water. Do not go swimming until your healthcare provider says it is okay. Carefully wash the wound with soap and water. Gently pat the area dry or allow it to air dry.       Change your bandages when they get wet, dirty, or after washing. Apply new, clean bandages as directed. Do not apply elastic bandages or tape too tight. Do not put powders or lotions over your incision.       Apply antibiotic ointment as directed. Your healthcare provider may give you antibiotic ointment to put over your wound if you have stitches. If you have strips of tape over your incision, let them dry up and fall off on their own. If they do not fall off within 14 days, gently remove them. If you have glue over your wound, do not remove or pick at it. If your glue comes off, do not replace it with glue that you have at home.       Check your wound every day for signs of infection such as swelling, redness, or pus.     Self-care:     Apply ice on your wound for 15 to 20 minutes every hour or as directed. Use an ice pack, or put crushed ice in a plastic bag. Cover it with a towel. Ice helps prevent tissue damage and decreases swelling and pain.      Use a splint as directed. A splint will decrease movement and stress on your wound. It may help it heal faster. A splint may be used for lacerations over joints or areas of your body that bend. Ask your healthcare provider how to apply and remove a splint.       Decrease scarring of your wound by applying ointments as directed. Do not apply ointments until your healthcare provider says it is okay. You may need to wait until your wound is healed. Ask which ointment to buy and how often to use it. After your wound is healed, use sunscreen over the area when you are out in the sun. You should do this for at least 6 months to 1 year after your injury.     Follow up with your healthcare provider as directed: You may need to follow up in 24 to 48 hours to have your wound checked for infection. You will need to return in 3 to 14 days if you have stitches or staples so they can be removed. Care for your wound as directed to prevent infection and help it heal. Write down your questions so you remember to ask them during your visits.       © Copyright [employer] 2019 All illustrations and images included in CareNotes are the copyrighted property of [employer] or [employer].

## 2023-08-13 NOTE — ED ADULT TRIAGE NOTE - CHIEF COMPLAINT QUOTE
patient bent over. lost balance, fell, hit head. has laceration to forehead. no blood thinners. no loc

## 2023-08-13 NOTE — ED PROVIDER NOTE - CARE PROVIDER_API CALL
Aron Mi  Neurosurgery  [address] [place] [address], [address] [address]  Phone: [phone]  Fax: [phone]  Follow Up Time:

## 2023-08-13 NOTE — CONSULT NOTE ADULT - SUBJECTIVE AND OBJECTIVE BOX
TRAUMA ACTIVATION LEVEL:  CODE / ALERT  / CONSULT  ACTIVATED BY: EMS**  /  ED**  INTUBATED: YES** / NO**      MECHANISM OF INJURY:   [] Blunt     [] MVC	  [X] Fall	  [] Pedestrian Struck	  [] Motorcycle     [] Assault     [] Bicycle collision    [] Sports injury    [] Penetrating    [] Gun Shot Wound      [] Stab Wound    GCS: 15 	E: 4	V: 5	M: 6    HPI: 68yM w/ PMHx of HTN, BPH, HLD and right charcot's ankle seen as Trauma Consult s/p fall, +HT, -LOC, -AC. Trauma assessment in ED: ABCs intact , GCS 15 , AAOx3. As per patient, he was in the bathroom and tripped, falling to the front of his head. External signs of trauma include a ~15-20cm vertical laceration to the frontal region of his head, now s/p repair by ED.    PAST MEDICAL & SURGICAL HISTORY:  HTN (hypertension)  Enlarged prostate  BPH  Hypercholesteremia  Charcot ankle, right  Cellulitis    Allergies  Augmentin (Rash; Diarrhea; Hives)  penicillin (Unknown)  Vitamin D (Hives)    Intolerances    Home Medications:    ROS: 10-system review is otherwise negative except HPI above.      Primary Survey:    A - airway intact  B - bilateral breath sounds and good chest rise  C - palpable pulses in all extremities  D - GCS 15 on arrival, LUNA  Exposure obtained    Vital Signs Last 24 Hrs  T(C): 36.4 (13 Aug 2023 10:34), Max: 36.4 (13 Aug 2023 10:34)  T(F): 97.6 (13 Aug 2023 10:34), Max: 97.6 (13 Aug 2023 10:34)  HR: 82 (13 Aug 2023 10:34) (82 - 82)  BP: 140/64 (13 Aug 2023 10:34) (140/64 - 140/64)  BP(mean): --  RR: 20 (13 Aug 2023 10:34) (20 - 20)  SpO2: 94% (13 Aug 2023 10:34) (94% - 94%)    Parameters below as of 13 Aug 2023 10:34  Patient On (Oxygen Delivery Method): room air    Secondary Survey:   General: NAD  HEENT: EOMI, PEERLA. +frontal vertical laceration ~15-20cm s/p repair by ED  Neck: Soft, midline trachea. no c-spine tenderness  Chest: No chest wall tenderness, no subcutaneous emphysema   Cardiac: S1, S2, RRR  Respiratory: Bilateral breath sounds, clear and equal bilaterally  Abdomen: Soft, non-distended, non-tender, no rebound, no guarding.  Groin: Normal appearing, pelvis stable   Ext: Moving b/l upper and lower extremities.   Back: No T/L/S spine tenderness, No palpable runoff/stepoff/deformity    Labs:  CAPILLARY BLOOD GLUCOSE      RADIOLOGY & ADDITIONAL STUDIES:  ---------------------------------------------------------------------------------------    < from: CT Head No Cont (08.13.23 @ 14:12) >  CT head: Superior left frontal scalp hematoma. No radiopaque foreign   body. No acute calvarial fracture. No evidence of acute transcortical   infarct, acute intracranial hemorrhage or mass effect.    CT cervical spine: Minimally displaced acute fracture of the C7 left   transverse process.     TRAUMA ACTIVATION LEVEL:  CODE / ALERT  / CONSULT  ACTIVATED BY: EMS**  /  ED**  INTUBATED: YES** / NO**    MECHANISM OF INJURY:   [] Blunt     [] MVC	  [X] Fall	  [] Pedestrian Struck	  [] Motorcycle     [] Assault     [] Bicycle collision    [] Sports injury    [] Penetrating    [] Gun Shot Wound      [] Stab Wound    GCS: 15 	E: 4	V: 5	M: 6    HPI: 68yM w/ PMHx of HTN, BPH, HLD and right charcot's ankle seen as Trauma Consult s/p fall, +HT, -LOC, -AC. Trauma assessment in ED: ABCs intact , GCS 15 , AAOx3. Admits mild headache, denies pain elsewhere. As per patient, he was cleaning the bathroom and tripped, hitting the front of his head on the bathtub. External signs of trauma include a ~10cm vertical laceration to the frontal region of his head, now s/p repair by ED.    PAST MEDICAL & SURGICAL HISTORY:  HTN (hypertension)  Enlarged prostate  BPH  Hypercholesteremia  Charcot ankle, right  Cellulitis    Allergies  Augmentin (Rash; Diarrhea; Hives)  penicillin (Unknown)  Vitamin D (Hives)    Intolerances    Home Medications:    ROS: 10-system review is otherwise negative except HPI above.      Primary Survey:    A - airway intact  B - bilateral breath sounds and good chest rise  C - palpable pulses in all extremities  D - GCS 15 on arrival, LUNA  Exposure obtained    Vital Signs Last 24 Hrs  T(C): 36.4 (13 Aug 2023 10:34), Max: 36.4 (13 Aug 2023 10:34)  T(F): 97.6 (13 Aug 2023 10:34), Max: 97.6 (13 Aug 2023 10:34)  HR: 82 (13 Aug 2023 10:34) (82 - 82)  BP: 140/64 (13 Aug 2023 10:34) (140/64 - 140/64)  BP(mean): --  RR: 20 (13 Aug 2023 10:34) (20 - 20)  SpO2: 94% (13 Aug 2023 10:34) (94% - 94%)    Parameters below as of 13 Aug 2023 10:34  Patient On (Oxygen Delivery Method): room air    Secondary Survey:   General: NAD  HEENT: EOMI, PEERLA. +frontal vertical laceration ~10cm s/p repair by ED  Neck: Soft, midline trachea. no c-spine tenderness, mild pain upon extension of neck  Chest: No chest wall tenderness, no subcutaneous emphysema   Cardiac: S1, S2, RRR  Respiratory: Bilateral breath sounds, clear and equal bilaterally  Abdomen: Soft, non-distended, non-tender, no rebound, no guarding.  Groin: Normal appearing, pelvis stable   Ext: Moving b/l upper and lower extremities.   Back: No T/L/S spine tenderness, No palpable runoff/stepoff/deformity    Labs:  CAPILLARY BLOOD GLUCOSE      RADIOLOGY & ADDITIONAL STUDIES:  ---------------------------------------------------------------------------------------  < from: CT Head No Cont (08.13.23 @ 14:12) >  CT head: Superior left frontal scalp hematoma. No radiopaque foreign   body. No acute calvarial fracture. No evidence of acute transcortical   infarct, acute intracranial hemorrhage or mass effect.    CT cervical spine: Minimally displaced acute fracture of the C7 left   transverse process.   TRAUMA ACTIVATION LEVEL:  CODE / ALERT  / CONSULT  ACTIVATED BY: EMS**  /  ED**  INTUBATED: YES** / NO**    MECHANISM OF INJURY:   [] Blunt     [] MVC	  [X] Fall	  [] Pedestrian Struck	  [] Motorcycle     [] Assault     [] Bicycle collision    [] Sports injury    [] Penetrating    [] Gun Shot Wound      [] Stab Wound    GCS: 15 	E: 4	V: 5	M: 6    HPI: 68yM w/ PMHx of HTN, BPH, HLD and right charcot's ankle seen as Trauma Consult s/p fall, +HT, -LOC, -AC. Trauma assessment in ED: ABCs intact , GCS 15 , AAOx3. Admits mild headache, denies pain elsewhere. As per patient, he was cleaning the bathroom and tripped, hitting the front of his head on the bathtub. External signs of trauma include a ~8cm vertical laceration to the frontal region of his head, now s/p repair by ED.    PAST MEDICAL & SURGICAL HISTORY:  HTN (hypertension)  Enlarged prostate  BPH  Hypercholesteremia  Charcot ankle, right  Cellulitis    Allergies  Augmentin (Rash; Diarrhea; Hives)  penicillin (Unknown)  Vitamin D (Hives)    Intolerances    Home Medications:    ROS: 10-system review is otherwise negative except HPI above.      Primary Survey:    A - airway intact  B - bilateral breath sounds and good chest rise  C - palpable pulses in all extremities  D - GCS 15 on arrival, LUNA  Exposure obtained    Vital Signs Last 24 Hrs  T(C): 36.4 (13 Aug 2023 10:34), Max: 36.4 (13 Aug 2023 10:34)  T(F): 97.6 (13 Aug 2023 10:34), Max: 97.6 (13 Aug 2023 10:34)  HR: 82 (13 Aug 2023 10:34) (82 - 82)  BP: 140/64 (13 Aug 2023 10:34) (140/64 - 140/64)  BP(mean): --  RR: 20 (13 Aug 2023 10:34) (20 - 20)  SpO2: 94% (13 Aug 2023 10:34) (94% - 94%)    Parameters below as of 13 Aug 2023 10:34  Patient On (Oxygen Delivery Method): room air    Secondary Survey:   General: NAD  HEENT: EOMI, PEERLA. +frontal vertical laceration ~10cm s/p repair by ED  Neck: Soft, midline trachea. no c-spine tenderness, mild pain upon extension of neck  Chest: No chest wall tenderness, no subcutaneous emphysema   Cardiac: S1, S2, RRR  Respiratory: Bilateral breath sounds, clear and equal bilaterally  Abdomen: Soft, non-distended, non-tender, no rebound, no guarding.  Groin: Normal appearing, pelvis stable   Ext: Moving b/l upper and lower extremities.   Back: No T/L/S spine tenderness, No palpable runoff/stepoff/deformity    Labs:  CAPILLARY BLOOD GLUCOSE      RADIOLOGY & ADDITIONAL STUDIES:  ---------------------------------------------------------------------------------------  < from: CT Head No Cont (08.13.23 @ 14:12) >  CT head: Superior left frontal scalp hematoma. No radiopaque foreign   body. No acute calvarial fracture. No evidence of acute transcortical   infarct, acute intracranial hemorrhage or mass effect.    CT cervical spine: Minimally displaced acute fracture of the C7 left   transverse process.

## 2023-08-13 NOTE — ED PROVIDER NOTE - PATIENT PORTAL LINK FT
You can access the FollowMyHealth Patient Portal offered by [facility] by registering at the following website: [email or web address]. By joining [employer]’s FollowMyHealth portal, you will also be able to view your health information using other applications (apps) compatible with our system. You can access the FollowMyHealth Patient Portal offered by [facility] by registering at the following website: [email or web address]. By joining [employer]’s FollowMyHealth portal, you will also be able to view your health information using other applications (apps) compatible with our system.

## 2023-08-13 NOTE — ED PROVIDER NOTE - OBJECTIVE STATEMENT
68 year old male with a history of Venous Stasis, Charcot Joint R ankle, BPH presents to the ED with laceration to scalp. Patient states that his bathroom was redone and he tripped on the new floor landing on his head. He Denies LOC and is not on AC. Now has large head laceration in the middle of his scalp that has some active bleeding. He admits to mild headache but denies dizziness, vision changes, paresthesias, weakness, neck pain, nausea, vomiting, other MSK injury. Unsure of last tetanus.

## 2023-08-14 ENCOUNTER — APPOINTMENT (OUTPATIENT)
Dept: PODIATRY | Facility: CLINIC | Age: 68
End: 2023-08-14

## 2023-08-21 ENCOUNTER — EMERGENCY (EMERGENCY)
Facility: HOSPITAL | Age: 68
LOS: 0 days | Discharge: HOME | End: 2023-08-21
Attending: EMERGENCY MEDICINE
Payer: MEDICARE

## 2023-08-21 VITALS
HEART RATE: 89 BPM | OXYGEN SATURATION: 96 % | TEMPERATURE: 98 F | WEIGHT: 250 LBS | SYSTOLIC BLOOD PRESSURE: 131 MMHG | RESPIRATION RATE: 19 BRPM | DIASTOLIC BLOOD PRESSURE: 66 MMHG | HEIGHT: 76 IN
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
## 2023-08-21 DIAGNOSIS — S01.01XA LACERATION WITHOUT FOREIGN BODY OF SCALP, INITIAL ENCOUNTER: ICD-10-CM
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
## 2023-08-21 PROCEDURE — L9995: CPT

## 2023-08-21 PROCEDURE — 99283 EMERGENCY DEPT VISIT LOW MDM: CPT

## 2023-08-21 NOTE — ED PROVIDER NOTE - PATIENT PORTAL LINK FT
You can access the FollowMyHealth Patient Portal offered by [facility] by registering at the following website: [email or web address]. By joining [employer]’s FollowMyHealth portal, you will also be able to view your health information using other applications (apps) compatible with our system.

## 2023-08-21 NOTE — ED ADULT TRIAGE NOTE - PRO INTERPRETER NEED 2
Chief complaint   Postop follow-up    HPI  39 year old female    Presents today for routine postoperative check.  Patient had robotic myomectomy on 2018.  Patient has significant discomfort with movements and bending.  Patient denies irregular vaginal bleeding.  Patient feels pain with voiding.      ALLERGIES:   Allergen Reactions   • Bactrim ANAPHYLAXIS   • Penicillins      Family allergy   • Latex RASH   • Adhesive   (Environmental) RASH     Tape ok for iv.  Bandaids and heart electrodes cause welts   • Peanuts [Peanut - Dietary Use Only] RASH     Mixed nuts.       Vitals:    18 1317   BP: 110/72   Temp: 98 °F (36.7 °C)       Abdominal incision is healing well  Pathology report confirms fibroid    Assessment  Post-op  Follow-up     Plan  Urinalysis was sent  Will delay return to work if patient continues to have significant postop pain.  Return to clinic for future appointment as clinically indicated.    
English

## 2023-08-21 NOTE — ED PROVIDER NOTE - OBJECTIVE STATEMENT
68 yoM here for suture and staple removal on top of head. Assisted living has been changing dressing every few days. Pt has not looked at the wound himself. Denies any fever, chills. No other acute complaints.

## 2023-08-21 NOTE — ED PROVIDER NOTE - PHYSICAL EXAMINATION
Initial vital signs reviewed.  General: NAD, nontoxic appearing.  HENT: NC. Large linear healing well-approximated sagittal laceration on top of head with small amounts of serosanguineous drainage. No surrounding erythema or edema.  Eyes: non-injected conjunctivae b/l.  Neck: neck collar in place.  CV: regular rate.  Pulm: nonlabored work of breathing.  Abd: soft, nondistended, nontender.  MSK: no joint deformity.  Skin: warm, dry, well-perfused.  Neuro: A&Ox4.  Psych: appropriate mood and affect.

## 2023-08-21 NOTE — ED PROVIDER NOTE - CLINICAL SUMMARY MEDICAL DECISION MAKING FREE TEXT BOX
Patient here for staple removal to the scalp of his head.  On exam there is small area of drainage from the laceration line.  The wound edges are intact.  Staples removed will cover with short course of antibiotics to prevent infection.

## 2023-08-21 NOTE — ED PROVIDER NOTE - NSFOLLOWUPINSTRUCTIONS_ED_ALL_ED_FT
Keep the wound clean, dry, and covered for the first 24 hours.  Afterwards, you can shower and wash the area normally with warm soapy water. Pat down dry.  Avoid scratching, rubbing, or exfoliating the area while the sutures are in place.  Once wound is healed and sutures removed, apply sunscreen to area to minimize scarring.    Laceration    A laceration is a cut that goes through all of the layers of the skin and into the tissue that is right under the skin. Some lacerations heal on their own. Others need to be closed with skin adhesive strips, skin glue, stitches (sutures), or staples. Proper laceration care minimizes the risk of infection and helps the laceration to heal better.  If non-absorbable stitches or staples have been placed, they must be taken out within the time frame instructed by your healthcare provider.    SEEK IMMEDIATE MEDICAL CARE IF YOU HAVE ANY OF THE FOLLOWING SYMPTOMS: swelling around the wound, worsening pain, drainage from the wound, red streaking going away from your wound, inability to move finger or toe near the laceration, or discoloration of skin near the laceration.

## 2023-09-07 ENCOUNTER — APPOINTMENT (OUTPATIENT)
Dept: PODIATRY | Facility: CLINIC | Age: 68
End: 2023-09-07
Payer: MEDICARE

## 2023-09-07 ENCOUNTER — OUTPATIENT (OUTPATIENT)
Dept: OUTPATIENT SERVICES | Facility: HOSPITAL | Age: 68
LOS: 1 days | End: 2023-09-07
Payer: MEDICARE

## 2023-09-07 DIAGNOSIS — M79.671 PAIN IN RIGHT FOOT: ICD-10-CM

## 2023-09-07 DIAGNOSIS — G89.29 PAIN IN RIGHT FOOT: ICD-10-CM

## 2023-09-07 DIAGNOSIS — M79.672 PAIN IN LEFT FOOT: ICD-10-CM

## 2023-09-07 DIAGNOSIS — G89.29 PAIN IN LEFT FOOT: ICD-10-CM

## 2023-09-07 DIAGNOSIS — Z00.00 ENCOUNTER FOR GENERAL ADULT MEDICAL EXAMINATION WITHOUT ABNORMAL FINDINGS: ICD-10-CM

## 2023-09-07 PROCEDURE — 11721 DEBRIDE NAIL 6 OR MORE: CPT

## 2023-09-08 NOTE — PHYSICAL EXAM
[General Appearance - Alert] : alert [General Appearance - In No Acute Distress] : in no acute distress [General Appearance - Well Nourished] : well nourished [General Appearance - Well Developed] : well developed [Ankle Swelling (On Exam)] : not present [Ankle Swelling Bilaterally] : bilaterally  [Varicose Veins Of Lower Extremities] : bilaterally [Delayed in the Right Toes] : capillary refills delayed in the right toes [Delayed in the Left Toes] : capillary refills delayed in the left toes [1+] : left foot dorsalis pedis 1+ [] : normal strength/tone [de-identified] : Midfoot collapse, Right  [Foot Ulcer] : no foot ulcer [Skin Induration] : skin induration [FreeTextEntry1] : Severe dystrophic nails x 10\par  Hyperpigmentation noted to B/L LE from healed venous stasis ulcers \par  Callus, Right hallux medial IPJ\par  Skin thin & atrophic, b/l [Sensation] : the sensory exam was normal to light touch and pinprick [Oriented To Time, Place, And Person] : oriented to person, place, and time

## 2023-09-08 NOTE — ASSESSMENT
[FreeTextEntry1] : \par  Plan:\par  -Pt seen and evaluated\par  -Mycotic nails debrided x 10\par  -Pt. RTC 2 months [Verbal] : verbal [Patient] : patient [Good - alert, interested, motivated] : Good - alert, interested, motivated [Demonstrates independently] : demonstrates independently

## 2023-09-08 NOTE — HISTORY OF PRESENT ILLNESS
[Diabetic Shoes] : diabetic shoes [Walker] : a walker [FreeTextEntry1] :  -69 y/o male returns for debridement of elongated toenails and calluses that are painful on ambulation.  -Pt. denies any other pedal complaints at this time.

## 2023-09-13 ENCOUNTER — APPOINTMENT (OUTPATIENT)
Dept: NEUROLOGY | Facility: CLINIC | Age: 68
End: 2023-09-13
Payer: MEDICARE

## 2023-09-13 VITALS
HEART RATE: 80 BPM | HEIGHT: 76 IN | BODY MASS INDEX: 30.2 KG/M2 | SYSTOLIC BLOOD PRESSURE: 128 MMHG | WEIGHT: 248 LBS | DIASTOLIC BLOOD PRESSURE: 72 MMHG

## 2023-09-13 DIAGNOSIS — M79.672 PAIN IN LEFT FOOT: ICD-10-CM

## 2023-09-13 DIAGNOSIS — M79.671 PAIN IN RIGHT FOOT: ICD-10-CM

## 2023-09-13 DIAGNOSIS — M54.2 CERVICALGIA: ICD-10-CM
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
## 2023-09-13 DIAGNOSIS — B35.1 TINEA UNGUIUM: ICD-10-CM
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
## 2023-09-13 PROCEDURE — 99202 OFFICE O/P NEW SF 15 MIN: CPT

## 2023-09-13 RX ORDER — DOXYCYCLINE HYCLATE 100 MG/1
100 CAPSULE ORAL
Qty: 20 | Refills: 0 | Status: DISCONTINUED | COMMUNITY
Start: 2018-06-04 | End: 2023-09-13

## 2023-09-13 RX ORDER — CEFPODOXIME PROXETIL 200 MG/1
200 TABLET, FILM COATED ORAL TWICE DAILY
Qty: 28 | Refills: 0 | Status: DISCONTINUED | COMMUNITY
Start: 2020-11-16 | End: 2023-09-13

## 2023-09-19 NOTE — ED PROVIDER NOTE - TOBACCO USE
From: Isabel Mukherjee  To: Isabel Alvarado  Sent: 8/10/2023 6:11 PM CDT  Subject: My health log for July 2023    Please see attached picture.   
Had annual yesterday (8/10/23) and patient sent E-advise with an attachment (her July symptom diary) to follow up on discussion at yesterdays visit. Will route encounter to Dr. Sweet for review and further plan of care.   
Will continue to keep a diary of care and will reevaluate.  
Never smoker

## 2023-10-11 ENCOUNTER — APPOINTMENT (OUTPATIENT)
Dept: NEUROSURGERY | Facility: CLINIC | Age: 68
End: 2023-10-11
Payer: MEDICARE

## 2023-10-11 VITALS — BODY MASS INDEX: 30.2 KG/M2 | HEIGHT: 76 IN | WEIGHT: 248 LBS

## 2023-10-11 VITALS — HEIGHT: 76 IN | WEIGHT: 248 LBS | BODY MASS INDEX: 30.2 KG/M2

## 2023-10-11 DIAGNOSIS — S12.9XXA FRACTURE OF NECK, UNSPECIFIED, INITIAL ENCOUNTER: ICD-10-CM

## 2023-10-11 PROCEDURE — 99203 OFFICE O/P NEW LOW 30 MIN: CPT

## 2023-10-11 PROCEDURE — 99213 OFFICE O/P EST LOW 20 MIN: CPT

## 2023-12-07 ENCOUNTER — APPOINTMENT (OUTPATIENT)
Dept: PODIATRY | Facility: CLINIC | Age: 68
End: 2023-12-07
Payer: MEDICARE

## 2023-12-07 ENCOUNTER — OUTPATIENT (OUTPATIENT)
Dept: OUTPATIENT SERVICES | Facility: HOSPITAL | Age: 68
LOS: 1 days | End: 2023-12-07
Payer: MEDICARE

## 2023-12-07 DIAGNOSIS — Q82.8 OTHER SPECIFIED CONGENITAL MALFORMATIONS OF SKIN: ICD-10-CM

## 2023-12-07 DIAGNOSIS — L84 CORNS AND CALLOSITIES: ICD-10-CM

## 2023-12-07 DIAGNOSIS — Z00.00 ENCOUNTER FOR GENERAL ADULT MEDICAL EXAMINATION WITHOUT ABNORMAL FINDINGS: ICD-10-CM

## 2023-12-07 PROCEDURE — 99212 OFFICE O/P EST SF 10 MIN: CPT

## 2023-12-07 PROCEDURE — 99212 OFFICE O/P EST SF 10 MIN: CPT | Mod: GC

## 2023-12-11 PROBLEM — L84 PRE-ULCERATIVE CALLUSES: Status: ACTIVE | Noted: 2023-12-11

## 2023-12-11 PROBLEM — Q82.8 KERATODERMA: Status: ACTIVE | Noted: 2018-05-21

## 2023-12-13 DIAGNOSIS — L84 CORNS AND CALLOSITIES: ICD-10-CM

## 2023-12-13 DIAGNOSIS — Q82.8 OTHER SPECIFIED CONGENITAL MALFORMATIONS OF SKIN: ICD-10-CM
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
## 2024-02-08 ENCOUNTER — APPOINTMENT (OUTPATIENT)
Dept: PODIATRY | Facility: CLINIC | Age: 69
End: 2024-02-08
Payer: MEDICARE

## 2024-02-08 ENCOUNTER — OUTPATIENT (OUTPATIENT)
Dept: OUTPATIENT SERVICES | Facility: HOSPITAL | Age: 69
LOS: 1 days | End: 2024-02-08
Payer: MEDICARE

## 2024-02-08 DIAGNOSIS — Z00.00 ENCOUNTER FOR GENERAL ADULT MEDICAL EXAMINATION WITHOUT ABNORMAL FINDINGS: ICD-10-CM

## 2024-02-08 DIAGNOSIS — B35.1 TINEA UNGUIUM: ICD-10-CM

## 2024-02-08 DIAGNOSIS — R26.2 DIFFICULTY IN WALKING, NOT ELSEWHERE CLASSIFIED: ICD-10-CM

## 2024-02-08 PROCEDURE — 11721 DEBRIDE NAIL 6 OR MORE: CPT

## 2024-02-08 NOTE — HISTORY OF PRESENT ILLNESS
[Diabetic Shoes] : diabetic shoes [Walker] : a walker [FreeTextEntry1] : -67 y/o male returns for debridement of R big toe callus that is painful on ambulation.  -Resides in group home. Reports h/o R big toe wound, nurse was helping w/wound care at home -States got nails trimmed at group home -Pt. denies any other pedal complaints at this time.

## 2024-02-08 NOTE — END OF VISIT
[] : Resident [FreeTextEntry2] : -aseptic debridement of callus, right first toe -nails debrided to patients tolerance return 2 month

## 2024-02-08 NOTE — PHYSICAL EXAM
[General Appearance - Alert] : alert [General Appearance - In No Acute Distress] : in no acute distress [General Appearance - Well Nourished] : well nourished [General Appearance - Well Developed] : well developed [Ankle Swelling Bilaterally] : bilaterally  [Delayed in the Right Toes] : capillary refills delayed in the right toes [Delayed in the Left Toes] : capillary refills delayed in the left toes [1+] : left foot dorsalis pedis 1+ [] : normal strength/tone [Skin Induration] : skin induration [Sensation] : the sensory exam was normal to light touch and pinprick [Oriented To Time, Place, And Person] : oriented to person, place, and time [Ankle Swelling (On Exam)] : not present [Varicose Veins Of Lower Extremities] : not present [de-identified] : Midfoot collapse, Right  [Foot Ulcer] : no foot ulcer [FreeTextEntry1] : Severe dystrophic and non-elongated nails x 10 Hyperpigmentation noted to B/L LE from healed venous stasis ulcers  Callus, Right hallux medial IPJ, lateral 5th digit callus left hallux Skin thin & atrophic, b/l

## 2024-02-08 NOTE — ASSESSMENT
[Verbal] : verbal [Patient] : patient [Good - alert, interested, motivated] : Good - alert, interested, motivated [Demonstrates independently] : demonstrates independently [FreeTextEntry1] : Assessment: -Onychomycosis -Callus bilateral  Plan: -Pt seen and evaluated -Aseptic dbx of R and L hallux callus performed w/#15 blade to normotrophic thickness -RTC 2 months for routine nail care

## 2024-02-21 DIAGNOSIS — B35.1 TINEA UNGUIUM: ICD-10-CM

## 2024-02-21 DIAGNOSIS — R26.2 DIFFICULTY IN WALKING, NOT ELSEWHERE CLASSIFIED: ICD-10-CM
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
## 2024-05-09 ENCOUNTER — APPOINTMENT (OUTPATIENT)
Dept: PODIATRY | Facility: CLINIC | Age: 69
End: 2024-05-09

## 2024-05-24 ENCOUNTER — INPATIENT (INPATIENT)
Facility: HOSPITAL | Age: 69
LOS: 3 days | Discharge: HOME | DRG: 603 | End: 2024-05-28
Attending: INTERNAL MEDICINE | Admitting: HOSPITALIST
Payer: MEDICARE

## 2024-05-24 VITALS
TEMPERATURE: 98 F | DIASTOLIC BLOOD PRESSURE: 61 MMHG | RESPIRATION RATE: 16 BRPM | SYSTOLIC BLOOD PRESSURE: 124 MMHG | HEART RATE: 69 BPM

## 2024-05-24 DIAGNOSIS — Z86.79 PERSONAL HISTORY OF OTHER DISEASES OF THE CIRCULATORY SYSTEM: Chronic | ICD-10-CM

## 2024-05-24 DIAGNOSIS — L03.90 CELLULITIS, UNSPECIFIED: ICD-10-CM

## 2024-05-24 LAB
ALBUMIN SERPL ELPH-MCNC: 3.7 G/DL — SIGNIFICANT CHANGE UP (ref 3.5–5.2)
ALP SERPL-CCNC: 57 U/L — SIGNIFICANT CHANGE UP (ref 30–115)
ALT FLD-CCNC: 13 U/L — SIGNIFICANT CHANGE UP (ref 0–41)
ANION GAP SERPL CALC-SCNC: 12 MMOL/L — SIGNIFICANT CHANGE UP (ref 7–14)
AST SERPL-CCNC: 28 U/L — SIGNIFICANT CHANGE UP (ref 0–41)
BASOPHILS # BLD AUTO: 0.03 K/UL — SIGNIFICANT CHANGE UP (ref 0–0.2)
BASOPHILS NFR BLD AUTO: 0.4 % — SIGNIFICANT CHANGE UP (ref 0–1)
BILIRUB SERPL-MCNC: 0.2 MG/DL — SIGNIFICANT CHANGE UP (ref 0.2–1.2)
BUN SERPL-MCNC: 13 MG/DL — SIGNIFICANT CHANGE UP (ref 10–20)
CALCIUM SERPL-MCNC: 8.6 MG/DL — SIGNIFICANT CHANGE UP (ref 8.4–10.5)
CHLORIDE SERPL-SCNC: 107 MMOL/L — SIGNIFICANT CHANGE UP (ref 98–110)
CO2 SERPL-SCNC: 24 MMOL/L — SIGNIFICANT CHANGE UP (ref 17–32)
CREAT SERPL-MCNC: 0.7 MG/DL — SIGNIFICANT CHANGE UP (ref 0.7–1.5)
EGFR: 101 ML/MIN/1.73M2 — SIGNIFICANT CHANGE UP
EOSINOPHIL # BLD AUTO: 0.28 K/UL — SIGNIFICANT CHANGE UP (ref 0–0.7)
EOSINOPHIL NFR BLD AUTO: 3.6 % — SIGNIFICANT CHANGE UP (ref 0–8)
GLUCOSE SERPL-MCNC: 87 MG/DL — SIGNIFICANT CHANGE UP (ref 70–99)
HCT VFR BLD CALC: 36.9 % — LOW (ref 42–52)
HGB BLD-MCNC: 12.2 G/DL — LOW (ref 14–18)
IMM GRANULOCYTES NFR BLD AUTO: 0.4 % — HIGH (ref 0.1–0.3)
LACTATE SERPL-SCNC: 1.5 MMOL/L — SIGNIFICANT CHANGE UP (ref 0.7–2)
LYMPHOCYTES # BLD AUTO: 1.79 K/UL — SIGNIFICANT CHANGE UP (ref 1.2–3.4)
LYMPHOCYTES # BLD AUTO: 22.8 % — SIGNIFICANT CHANGE UP (ref 20.5–51.1)
MCHC RBC-ENTMCNC: 30.5 PG — SIGNIFICANT CHANGE UP (ref 27–31)
MCHC RBC-ENTMCNC: 33.1 G/DL — SIGNIFICANT CHANGE UP (ref 32–37)
MCV RBC AUTO: 92.3 FL — SIGNIFICANT CHANGE UP (ref 80–94)
MONOCYTES # BLD AUTO: 0.77 K/UL — HIGH (ref 0.1–0.6)
MONOCYTES NFR BLD AUTO: 9.8 % — HIGH (ref 1.7–9.3)
NEUTROPHILS # BLD AUTO: 4.95 K/UL — SIGNIFICANT CHANGE UP (ref 1.4–6.5)
NEUTROPHILS NFR BLD AUTO: 63 % — SIGNIFICANT CHANGE UP (ref 42.2–75.2)
NRBC # BLD: 0 /100 WBCS — SIGNIFICANT CHANGE UP (ref 0–0)
PLATELET # BLD AUTO: 265 K/UL — SIGNIFICANT CHANGE UP (ref 130–400)
PMV BLD: 10.7 FL — HIGH (ref 7.4–10.4)
POTASSIUM SERPL-MCNC: 4.9 MMOL/L — SIGNIFICANT CHANGE UP (ref 3.5–5)
POTASSIUM SERPL-SCNC: 4.9 MMOL/L — SIGNIFICANT CHANGE UP (ref 3.5–5)
PROT SERPL-MCNC: 6.7 G/DL — SIGNIFICANT CHANGE UP (ref 6–8)
RBC # BLD: 4 M/UL — LOW (ref 4.7–6.1)
RBC # FLD: 13.3 % — SIGNIFICANT CHANGE UP (ref 11.5–14.5)
SODIUM SERPL-SCNC: 143 MMOL/L — SIGNIFICANT CHANGE UP (ref 135–146)
WBC # BLD: 7.85 K/UL — SIGNIFICANT CHANGE UP (ref 4.8–10.8)
WBC # FLD AUTO: 7.85 K/UL — SIGNIFICANT CHANGE UP (ref 4.8–10.8)

## 2024-05-24 PROCEDURE — 80053 COMPREHEN METABOLIC PANEL: CPT

## 2024-05-24 PROCEDURE — 82746 ASSAY OF FOLIC ACID SERUM: CPT

## 2024-05-24 PROCEDURE — 83540 ASSAY OF IRON: CPT

## 2024-05-24 PROCEDURE — 36415 COLL VENOUS BLD VENIPUNCTURE: CPT

## 2024-05-24 PROCEDURE — 83550 IRON BINDING TEST: CPT

## 2024-05-24 PROCEDURE — 99223 1ST HOSP IP/OBS HIGH 75: CPT

## 2024-05-24 PROCEDURE — 85025 COMPLETE CBC W/AUTO DIFF WBC: CPT

## 2024-05-24 PROCEDURE — 87641 MR-STAPH DNA AMP PROBE: CPT

## 2024-05-24 PROCEDURE — G0378: CPT

## 2024-05-24 PROCEDURE — 82607 VITAMIN B-12: CPT

## 2024-05-24 PROCEDURE — 82728 ASSAY OF FERRITIN: CPT

## 2024-05-24 PROCEDURE — 87640 STAPH A DNA AMP PROBE: CPT

## 2024-05-24 PROCEDURE — 99285 EMERGENCY DEPT VISIT HI MDM: CPT

## 2024-05-24 PROCEDURE — 83735 ASSAY OF MAGNESIUM: CPT

## 2024-05-24 PROCEDURE — 93971 EXTREMITY STUDY: CPT | Mod: RT

## 2024-05-24 RX ORDER — ACETAMINOPHEN 500 MG
650 TABLET ORAL EVERY 6 HOURS
Refills: 0 | Status: DISCONTINUED | OUTPATIENT
Start: 2024-05-24 | End: 2024-05-28

## 2024-05-24 RX ORDER — LACTOBACILLUS ACIDOPHILUS 100MM CELL
1 CAPSULE ORAL DAILY
Refills: 0 | Status: DISCONTINUED | OUTPATIENT
Start: 2024-05-24 | End: 2024-05-28

## 2024-05-24 RX ORDER — SOD,AMMONIUM,POTASSIUM LACTATE
1 CREAM (GRAM) TOPICAL
Refills: 0 | Status: DISCONTINUED | OUTPATIENT
Start: 2024-05-24 | End: 2024-05-28

## 2024-05-24 RX ORDER — LANOLIN ALCOHOL/MO/W.PET/CERES
3 CREAM (GRAM) TOPICAL AT BEDTIME
Refills: 0 | Status: DISCONTINUED | OUTPATIENT
Start: 2024-05-24 | End: 2024-05-28

## 2024-05-24 RX ORDER — ENOXAPARIN SODIUM 100 MG/ML
40 INJECTION SUBCUTANEOUS EVERY 24 HOURS
Refills: 0 | Status: DISCONTINUED | OUTPATIENT
Start: 2024-05-24 | End: 2024-05-28

## 2024-05-24 RX ORDER — ONDANSETRON 8 MG/1
4 TABLET, FILM COATED ORAL EVERY 8 HOURS
Refills: 0 | Status: DISCONTINUED | OUTPATIENT
Start: 2024-05-24 | End: 2024-05-28

## 2024-05-24 RX ORDER — ASPIRIN/CALCIUM CARB/MAGNESIUM 324 MG
81 TABLET ORAL DAILY
Refills: 0 | Status: DISCONTINUED | OUTPATIENT
Start: 2024-05-24 | End: 2024-05-28

## 2024-05-24 RX ORDER — TAMSULOSIN HYDROCHLORIDE 0.4 MG/1
0.4 CAPSULE ORAL AT BEDTIME
Refills: 0 | Status: DISCONTINUED | OUTPATIENT
Start: 2024-05-24 | End: 2024-05-28

## 2024-05-24 RX ORDER — CEFAZOLIN SODIUM 1 G
2000 VIAL (EA) INJECTION EVERY 8 HOURS
Refills: 0 | Status: DISCONTINUED | OUTPATIENT
Start: 2024-05-24 | End: 2024-05-27

## 2024-05-24 RX ADMIN — Medication 100 MILLIGRAM(S): at 15:11

## 2024-05-24 RX ADMIN — Medication 100 MILLIGRAM(S): at 21:13

## 2024-05-24 RX ADMIN — TAMSULOSIN HYDROCHLORIDE 0.4 MILLIGRAM(S): 0.4 CAPSULE ORAL at 21:13

## 2024-05-24 NOTE — H&P ADULT - ASSESSMENT
67 yo male patient with PMHx of BPH, PVD, right ankle Charcot  presenting for right leg erythema. patient was started on keflex since 2 days but without improvement.    #right lower leg cellulitis  - no leukocytosis  - started 2 days of keflex  - s/p clindamycin 600 mg in ED  - MRSA  - start ancef  - blood culture    #PVD s/p right femoral angiogram in 2018 -> AT was occluded but failed recanalization  #right ankle charcot  - OP podiatry fu    #BPH  - c/w charcot    #MISC  DVT PPX  GI PPX  DIET  CODE      67 yo male patient with PMHx of BPH, PVD, right foot Charcot deformity, varicose veins s/p EVLT  presenting for right leg erythema. patient was started on keflex since 2 days but without improvement.    #right lower leg cellulitis  - no leukocytosis, afebrile  - started by his MD OP on keflex on 500 mg 2 tablets TID -> finished only 2 days  - s/p clindamycin 600 mg in ED  - MRSA swab  - RLE VA duplex  - start ancef  - blood culture    #PVD s/p right femoral angiogram in 2018 -> AT was occluded but unsuccessful recanalization  #right foot charcot deformity  #varicose veins s/p EVLT  - c/w ASA  - OP podiatry fu    #BPH  - c/w tamsulosin    #MISC  DVT PPX Lovenox  GI PPX not needed  DIET DASH/TLC  CODE FULL       69 yo male patient with PMHx of BPH, PVD, right foot Charcot deformity, varicose veins s/p EVLT  presenting for right leg erythema. patient was started on keflex since 2 days but without improvement.    #right lower leg cellulitis  - no leukocytosis, afebrile  - started by his MD OP on keflex on 500 mg 2 tablets TID -> finished only 2 days  - s/p clindamycin 600 mg in ED  - MRSA swab  - RLE VA duplex  - start ancef  - blood culture  -ID consult     #PVD s/p right femoral angiogram in 2018 -> AT was occluded but unsuccessful recanalization  #right foot charcot deformity  #varicose veins s/p EVLT  - c/w ASA  - OP podiatry fu    #BPH  - c/w tamsulosin    #MISC  DVT PPX Lovenox  GI PPX not needed  DIET DASH/TLC  CODE FULL

## 2024-05-24 NOTE — H&P ADULT - HISTORY OF PRESENT ILLNESS
69 yo male patient with PMHx of HTN, HLD, BPH, PVD, right ankle charcot  presenting for      VITALS:   T(C): 36.4 (05-24-24 @ 16:45), Max: 36.6 (05-24-24 @ 13:32)  HR: 70 (05-24-24 @ 16:45) (69 - 70)  BP: 137/84 (05-24-24 @ 16:45) (124/61 - 137/84)  RR: 18 (05-24-24 @ 16:45) (16 - 18)  SpO2: 99% (05-24-24 @ 16:45) (99% - 99%)    in ED, vitals were WNLs. labs were significant for anemia (Hb12.2). patient received clindamycin 600 mg IV and admitted to medicine for further investigation 69 yo male patient with PMHx of BPH, PVD, right ankle Charcot  presenting for right leg erythema. history goes back to 2 days ago when patient was seen by his doctor who noticed erythema. on his right lower leg -> prescribed him keflex without improvement. patient does not remember when his erythema started as he has chronic skin changes. he denied fever, chills, pus, pain, abdominal pain, N/V, chest pain, SOB, urine or bowel changes.      VITALS:   T(C): 36.4 (05-24-24 @ 16:45), Max: 36.6 (05-24-24 @ 13:32)  HR: 70 (05-24-24 @ 16:45) (69 - 70)  BP: 137/84 (05-24-24 @ 16:45) (124/61 - 137/84)  RR: 18 (05-24-24 @ 16:45) (16 - 18)  SpO2: 99% (05-24-24 @ 16:45) (99% - 99%)    in ED, vitals were WNLs. labs were significant for anemia (Hb12.2). patient received clindamycin 600 mg IV and admitted to medicine for further investigation 69 yo male patient with PMHx of BPH, PVD, right foot Charcot deformity, varicose veins s/p EVLT presenting for right leg erythema. history goes back to 2 days ago when patient was seen by his PMD who noticed erythema on his right lower leg -> prescribed him keflex without improvement hence was sent in. patient does not remember when his erythema started as he has chronic skin changes. he denied fever, chills, pus, pain, abdominal pain, N/V, chest pain, SOB, urine or bowel changes. patient reported that it was not his first episode of cellulitis.      VITALS:   T(C): 36.4 (05-24-24 @ 16:45), Max: 36.6 (05-24-24 @ 13:32)  HR: 70 (05-24-24 @ 16:45) (69 - 70)  BP: 137/84 (05-24-24 @ 16:45) (124/61 - 137/84)  RR: 18 (05-24-24 @ 16:45) (16 - 18)  SpO2: 99% (05-24-24 @ 16:45) (99% - 99%)    in ED, vitals were WNLs. labs were significant for anemia (Hb12.2). patient received clindamycin 600 mg IV and admitted to medicine for further investigation

## 2024-05-24 NOTE — ED PROVIDER NOTE - ATTENDING APP SHARED VISIT CONTRIBUTION OF CARE
I personally evaluated the patient. I reviewed the Resident’s or Physician Assistant’s note (as assigned above), and agree with the findings and plan except as documented in my note.  68-year-old male with no pertinent past medical history presented to ED due to right leg redness and warmness.  Patient states was seen to ED for IV antibiotic from his primary care doctor.  Patient has a few days ago was seen by his primary care doctor was given oral antibiotic teak which patient is still has been taking as prescribed however today went to the primary care doctor follow-up doctor told him he is infection has a spread more and was sent to ED for evaluation.  Denies any trauma denies having diabetes denies any calf tenderness swelling.  CONSTITUTIONAL: no acute distress  HEAD: normocephalic; atraumatic  EYES: no conjunctival injection, no scleral icterus  ENT: no nasal discharge; airway clear.  CARD: warm and well perfused, not tachycardic  RESP: breathing comfortably on RA, speaking in full sentences w/o distress  EXT: moving all extremities spontaneously, normal ROM. No clubbing, cyanosis or edema  SKIN: warm and dry, redness on right lower extremity no crepistus, + warmness, no wound the redness extend to above ankle  will send labs give iv abx and likely admit for failure of oral abx for cellulitis I personally evaluated the patient. I reviewed the Resident’s or Physician Assistant’s note (as assigned above), and agree with the findings and plan except as documented in my note.  68-year-old male with no pertinent past medical history presented to ED due to right leg redness and warmness.  Patient states was seen to ED for IV antibiotic from his primary care doctor.  Patient has a few days ago was seen by his primary care doctor was given oral antibiotic teak which patient is still has been taking as prescribed however today went to the primary care doctor follow-up doctor told him he is infection has a spread more and was sent to ED for evaluation.  Denies any trauma denies having diabetes denies any calf tenderness swelling.  CONSTITUTIONAL: no acute distress  HEAD: normocephalic; atraumatic  EYES: no conjunctival injection, no scleral icterus  ENT: no nasal discharge; airway clear.  CARD: warm and well perfused, not tachycardic  RESP: breathing comfortably on RA, speaking in full sentences w/o distress  EXT: moving all extremities spontaneously, normal ROM. No clubbing, cyanosis or edema  SKIN: warm and dry, redness on right lower extremity no crepitus, + warmness, no wound the redness extend to above ankle  will send labs give iv abx and likely admit for failure of oral abx for cellulitis

## 2024-05-24 NOTE — ED PROVIDER NOTE - CLINICAL SUMMARY MEDICAL DECISION MAKING FREE TEXT BOX
69 yo male patient with PMHx of BPH, PVD, right foot Charcot deformity, varicose veins s/p EVLT presenting for right leg erythema. Patient states few days ago was seen by his PMD who noticed erythema on his right lower leg gave him keflex  patient does not remember when his erythema started as he has chronic skin changes. However patient states PMD saw him stated that his infection has spread therefore sent him to ED.Denies any trauma or fever.  Exam here vitals are stable afebrile there is diffuse redness of the right lower extremity above the ankle and it is warm there is no crepitus.  Patient is allergic to Augmentin.  Given clindamycin and admitted to medicine for further management. Cultures are drawn lactate negative

## 2024-05-24 NOTE — PATIENT PROFILE ADULT - FUNCTIONAL ASSESSMENT - BASIC MOBILITY SECTION LABEL
General Sunscreen Counseling: I recommended a broad spectrum sunscreen with a SPF of 30 or higher. Sunscreens should be applied at least 15 minutes prior to expected sun exposure and then every 2 hours after that as long as sun exposure continues. If swimming or exercising sunscreen should be reapplied every 45 minutes to an hour after getting wet or sweating.  One ounce, or the equivalent of a shot glass full of sunscreen, is adequate to protect the skin not covered by a bathing suit. I also recommended a lip balm with a sunscreen as well. Detail Level: Detailed .

## 2024-05-24 NOTE — H&P ADULT - NSHPPHYSICALEXAM_GEN_ALL_CORE
GENERAL: NAD, lying in bed comfortably  HEAD:  Atraumatic, Normocephalic  EYES: EOMI, PERRLA, conjunctiva and sclera clear  ENT: Moist mucous membranes  NECK: Supple, No JVD  CHEST/LUNG: Clear to auscultation bilaterally; No rales, rhonchi, wheezing, or rubs. Unlabored respirations  HEART: Regular rate and rhythm; No murmurs, rubs, or gallops  ABDOMEN: BSx4; Soft, nontender, nondistended  EXTREMITIES:  2+ Peripheral Pulses, brisk capillary refill. No clubbing, cyanosis, or edema  NERVOUS SYSTEM:  A&Ox3, no focal deficits   SKIN: No rashes or lesions GENERAL: NAD  HEAD:  Atraumatic, Normocephalic  EYES: conjunctiva and sclera clear  ENT: Moist mucous membranes  NECK: No JVD  CHEST/LUNG: Clear to auscultation bilaterally; No rales, rhonchi, wheezing, or rubs. Unlabored respirations  HEART: Regular rate and rhythm; No murmurs, rubs, or gallops  ABDOMEN: BSx4; Soft, nontender, nondistended  EXTREMITIES:  right lower leg erythema associated with warmth but non tender  NERVOUS SYSTEM:  A&Ox3, no focal deficits Vital Signs Last 24 Hrs  T(C): 36.4 (24 May 2024 16:45), Max: 36.6 (24 May 2024 13:32)  T(F): 97.6 (24 May 2024 16:45), Max: 97.9 (24 May 2024 13:32)  HR: 70 (24 May 2024 16:45) (69 - 70)  BP: 137/84 (24 May 2024 16:45) (124/61 - 137/84)  BP(mean): --  RR: 18 (24 May 2024 16:45) (16 - 18)  SpO2: 99% (24 May 2024 16:45) (99% - 99%)    Parameters below as of 24 May 2024 16:45  Patient On (Oxygen Delivery Method): room air        GENERAL: NAD  HEAD:  Atraumatic, Normocephalic  EYES: conjunctiva and sclera clear  ENT: Moist mucous membranes  NECK: No JVD  CHEST/LUNG: Clear to auscultation bilaterally; No rales, rhonchi, wheezing, or rubs. Unlabored respirations  HEART: Regular rate and rhythm; No murmurs, rubs, or gallops  ABDOMEN: BSx4; Soft, nontender, nondistended  EXTREMITIES:  right lower leg erythema associated with warmth but non tender  NERVOUS SYSTEM:  A&Ox3, no focal deficits

## 2024-05-24 NOTE — ED PROVIDER NOTE - PHYSICAL EXAMINATION
VITAL SIGNS: I have reviewed nursing notes and confirm.  CONSTITUTIONAL: Patient is in no acute distress.  SKIN: +Erythema, warmth to right anterior/shin/lower leg above ankle.   HEAD: Normocephalic; atraumatic.  EYES: PERRL, EOM intact; conjunctiva and sclera clear.  ENT: No nasal discharge; airway clear.   NECK: Supple; non tender.  CARD: S1, S2 normal; no murmurs, gallops, or rubs. Regular rate and rhythm.  RESP: Clear to auscultation bilaterally. No wheezes, rales or rhonchi.  ABD: Normal bowel sounds; soft; non-distended; non-tender.   EXT: Normal ROM.   LYMPH: No acute cervical adenopathy.  NEURO: Alert, oriented. Grossly unremarkable. No focal deficits.  PSYCH: Cooperative, appropriate.

## 2024-05-24 NOTE — ED ADULT NURSE NOTE - NSFALLRISKINTERV_ED_ALL_ED

## 2024-05-24 NOTE — H&P ADULT - NSHPLABSRESULTS_GEN_ALL_CORE
LABS:                          12.2   7.85  )-----------( 265      ( 24 May 2024 14:31 )             36.9     05-24    143  |  107  |  13  ----------------------------<  87  4.9   |  24  |  0.7    Ca    8.6      24 May 2024 14:31    TPro  6.7  /  Alb  3.7  /  TBili  0.2  /  DBili  x   /  AST  28  /  ALT  13  /  AlkPhos  57  05-24    LIVER FUNCTIONS - ( 24 May 2024 14:31 )  Alb: 3.7 g/dL / Pro: 6.7 g/dL / ALK PHOS: 57 U/L / ALT: 13 U/L / AST: 28 U/L / GGT: x             Urinalysis Basic - ( 24 May 2024 14:31 )    Color: x / Appearance: x / SG: x / pH: x  Gluc: 87 mg/dL / Ketone: x  / Bili: x / Urobili: x   Blood: x / Protein: x / Nitrite: x   Leuk Esterase: x / RBC: x / WBC x   Sq Epi: x / Non Sq Epi: x / Bacteria: x Breath Sounds equal & clear to percussion & auscultation, no accessory muscle use

## 2024-05-24 NOTE — ED PROVIDER NOTE - OBJECTIVE STATEMENT
69 yo M with pmhx of PVD, BPH, right foot Charcot deformity presenting for evaluation of worsening redness and swelling to his right lower extremity. Patient was placed on keflex by his pmd a few days ago without any relief. Symptoms are worsening. No fevers or chills. No trauma.

## 2024-05-25 LAB
ALBUMIN SERPL ELPH-MCNC: 3.5 G/DL — SIGNIFICANT CHANGE UP (ref 3.5–5.2)
ALP SERPL-CCNC: 53 U/L — SIGNIFICANT CHANGE UP (ref 30–115)
ALT FLD-CCNC: 11 U/L — SIGNIFICANT CHANGE UP (ref 0–41)
ANION GAP SERPL CALC-SCNC: 6 MMOL/L — LOW (ref 7–14)
AST SERPL-CCNC: 13 U/L — SIGNIFICANT CHANGE UP (ref 0–41)
BASOPHILS # BLD AUTO: 0.02 K/UL — SIGNIFICANT CHANGE UP (ref 0–0.2)
BASOPHILS NFR BLD AUTO: 0.3 % — SIGNIFICANT CHANGE UP (ref 0–1)
BILIRUB SERPL-MCNC: 0.2 MG/DL — SIGNIFICANT CHANGE UP (ref 0.2–1.2)
BUN SERPL-MCNC: 10 MG/DL — SIGNIFICANT CHANGE UP (ref 10–20)
CALCIUM SERPL-MCNC: 8.5 MG/DL — SIGNIFICANT CHANGE UP (ref 8.4–10.5)
CHLORIDE SERPL-SCNC: 108 MMOL/L — SIGNIFICANT CHANGE UP (ref 98–110)
CO2 SERPL-SCNC: 29 MMOL/L — SIGNIFICANT CHANGE UP (ref 17–32)
CREAT SERPL-MCNC: 0.7 MG/DL — SIGNIFICANT CHANGE UP (ref 0.7–1.5)
EGFR: 101 ML/MIN/1.73M2 — SIGNIFICANT CHANGE UP
EOSINOPHIL # BLD AUTO: 0.22 K/UL — SIGNIFICANT CHANGE UP (ref 0–0.7)
EOSINOPHIL NFR BLD AUTO: 3.3 % — SIGNIFICANT CHANGE UP (ref 0–8)
FERRITIN SERPL-MCNC: 319 NG/ML — SIGNIFICANT CHANGE UP (ref 30–400)
FOLATE SERPL-MCNC: 14.8 NG/ML — SIGNIFICANT CHANGE UP
GLUCOSE SERPL-MCNC: 92 MG/DL — SIGNIFICANT CHANGE UP (ref 70–99)
HCT VFR BLD CALC: 36.6 % — LOW (ref 42–52)
HGB BLD-MCNC: 11.9 G/DL — LOW (ref 14–18)
IMM GRANULOCYTES NFR BLD AUTO: 0.3 % — SIGNIFICANT CHANGE UP (ref 0.1–0.3)
IRON SATN MFR SERPL: 30 % — SIGNIFICANT CHANGE UP (ref 15–50)
IRON SATN MFR SERPL: 53 UG/DL — SIGNIFICANT CHANGE UP (ref 35–150)
LYMPHOCYTES # BLD AUTO: 1.47 K/UL — SIGNIFICANT CHANGE UP (ref 1.2–3.4)
LYMPHOCYTES # BLD AUTO: 22 % — SIGNIFICANT CHANGE UP (ref 20.5–51.1)
MAGNESIUM SERPL-MCNC: 2.1 MG/DL — SIGNIFICANT CHANGE UP (ref 1.8–2.4)
MCHC RBC-ENTMCNC: 29.8 PG — SIGNIFICANT CHANGE UP (ref 27–31)
MCHC RBC-ENTMCNC: 32.5 G/DL — SIGNIFICANT CHANGE UP (ref 32–37)
MCV RBC AUTO: 91.5 FL — SIGNIFICANT CHANGE UP (ref 80–94)
MONOCYTES # BLD AUTO: 0.67 K/UL — HIGH (ref 0.1–0.6)
MONOCYTES NFR BLD AUTO: 10 % — HIGH (ref 1.7–9.3)
MRSA PCR RESULT.: NEGATIVE — SIGNIFICANT CHANGE UP
NEUTROPHILS # BLD AUTO: 4.29 K/UL — SIGNIFICANT CHANGE UP (ref 1.4–6.5)
NEUTROPHILS NFR BLD AUTO: 64.1 % — SIGNIFICANT CHANGE UP (ref 42.2–75.2)
NRBC # BLD: 0 /100 WBCS — SIGNIFICANT CHANGE UP (ref 0–0)
PLATELET # BLD AUTO: 254 K/UL — SIGNIFICANT CHANGE UP (ref 130–400)
PMV BLD: 9.8 FL — SIGNIFICANT CHANGE UP (ref 7.4–10.4)
POTASSIUM SERPL-MCNC: 4.1 MMOL/L — SIGNIFICANT CHANGE UP (ref 3.5–5)
POTASSIUM SERPL-SCNC: 4.1 MMOL/L — SIGNIFICANT CHANGE UP (ref 3.5–5)
PROT SERPL-MCNC: 6.2 G/DL — SIGNIFICANT CHANGE UP (ref 6–8)
RBC # BLD: 4 M/UL — LOW (ref 4.7–6.1)
RBC # FLD: 13.2 % — SIGNIFICANT CHANGE UP (ref 11.5–14.5)
SODIUM SERPL-SCNC: 143 MMOL/L — SIGNIFICANT CHANGE UP (ref 135–146)
TIBC SERPL-MCNC: 176 UG/DL — LOW (ref 220–430)
UIBC SERPL-MCNC: 123 UG/DL — SIGNIFICANT CHANGE UP (ref 110–370)
VIT B12 SERPL-MCNC: 510 PG/ML — SIGNIFICANT CHANGE UP (ref 232–1245)
WBC # BLD: 6.69 K/UL — SIGNIFICANT CHANGE UP (ref 4.8–10.8)
WBC # FLD AUTO: 6.69 K/UL — SIGNIFICANT CHANGE UP (ref 4.8–10.8)

## 2024-05-25 PROCEDURE — 99232 SBSQ HOSP IP/OBS MODERATE 35: CPT

## 2024-05-25 PROCEDURE — 93971 EXTREMITY STUDY: CPT | Mod: 26,RT

## 2024-05-25 RX ADMIN — Medication 1 APPLICATION(S): at 18:00

## 2024-05-25 RX ADMIN — Medication 100 MILLIGRAM(S): at 05:20

## 2024-05-25 RX ADMIN — Medication 1 TABLET(S): at 12:26

## 2024-05-25 RX ADMIN — Medication 100 MILLIGRAM(S): at 21:31

## 2024-05-25 RX ADMIN — TAMSULOSIN HYDROCHLORIDE 0.4 MILLIGRAM(S): 0.4 CAPSULE ORAL at 21:30

## 2024-05-25 RX ADMIN — ENOXAPARIN SODIUM 40 MILLIGRAM(S): 100 INJECTION SUBCUTANEOUS at 12:33

## 2024-05-25 RX ADMIN — Medication 81 MILLIGRAM(S): at 12:26

## 2024-05-25 RX ADMIN — Medication 100 MILLIGRAM(S): at 12:26

## 2024-05-25 RX ADMIN — Medication 1 APPLICATION(S): at 05:21

## 2024-05-26 LAB
ALBUMIN SERPL ELPH-MCNC: 3.6 G/DL — SIGNIFICANT CHANGE UP (ref 3.5–5.2)
ALP SERPL-CCNC: 58 U/L — SIGNIFICANT CHANGE UP (ref 30–115)
ALT FLD-CCNC: 9 U/L — SIGNIFICANT CHANGE UP (ref 0–41)
ANION GAP SERPL CALC-SCNC: 13 MMOL/L — SIGNIFICANT CHANGE UP (ref 7–14)
AST SERPL-CCNC: 15 U/L — SIGNIFICANT CHANGE UP (ref 0–41)
BASOPHILS # BLD AUTO: 0.05 K/UL — SIGNIFICANT CHANGE UP (ref 0–0.2)
BASOPHILS NFR BLD AUTO: 0.6 % — SIGNIFICANT CHANGE UP (ref 0–1)
BILIRUB SERPL-MCNC: 0.3 MG/DL — SIGNIFICANT CHANGE UP (ref 0.2–1.2)
BUN SERPL-MCNC: 12 MG/DL — SIGNIFICANT CHANGE UP (ref 10–20)
CALCIUM SERPL-MCNC: 8.7 MG/DL — SIGNIFICANT CHANGE UP (ref 8.4–10.5)
CHLORIDE SERPL-SCNC: 106 MMOL/L — SIGNIFICANT CHANGE UP (ref 98–110)
CO2 SERPL-SCNC: 24 MMOL/L — SIGNIFICANT CHANGE UP (ref 17–32)
CREAT SERPL-MCNC: 0.7 MG/DL — SIGNIFICANT CHANGE UP (ref 0.7–1.5)
EGFR: 101 ML/MIN/1.73M2 — SIGNIFICANT CHANGE UP
EOSINOPHIL # BLD AUTO: 0.25 K/UL — SIGNIFICANT CHANGE UP (ref 0–0.7)
EOSINOPHIL NFR BLD AUTO: 3.2 % — SIGNIFICANT CHANGE UP (ref 0–8)
GLUCOSE SERPL-MCNC: 85 MG/DL — SIGNIFICANT CHANGE UP (ref 70–99)
HCT VFR BLD CALC: 36.6 % — LOW (ref 42–52)
HGB BLD-MCNC: 12.2 G/DL — LOW (ref 14–18)
IMM GRANULOCYTES NFR BLD AUTO: 0.4 % — HIGH (ref 0.1–0.3)
LYMPHOCYTES # BLD AUTO: 1.62 K/UL — SIGNIFICANT CHANGE UP (ref 1.2–3.4)
LYMPHOCYTES # BLD AUTO: 20.7 % — SIGNIFICANT CHANGE UP (ref 20.5–51.1)
MAGNESIUM SERPL-MCNC: 2.2 MG/DL — SIGNIFICANT CHANGE UP (ref 1.8–2.4)
MCHC RBC-ENTMCNC: 30.3 PG — SIGNIFICANT CHANGE UP (ref 27–31)
MCHC RBC-ENTMCNC: 33.3 G/DL — SIGNIFICANT CHANGE UP (ref 32–37)
MCV RBC AUTO: 91 FL — SIGNIFICANT CHANGE UP (ref 80–94)
MONOCYTES # BLD AUTO: 0.77 K/UL — HIGH (ref 0.1–0.6)
MONOCYTES NFR BLD AUTO: 9.8 % — HIGH (ref 1.7–9.3)
NEUTROPHILS # BLD AUTO: 5.12 K/UL — SIGNIFICANT CHANGE UP (ref 1.4–6.5)
NEUTROPHILS NFR BLD AUTO: 65.3 % — SIGNIFICANT CHANGE UP (ref 42.2–75.2)
NRBC # BLD: 0 /100 WBCS — SIGNIFICANT CHANGE UP (ref 0–0)
PLATELET # BLD AUTO: 272 K/UL — SIGNIFICANT CHANGE UP (ref 130–400)
PMV BLD: 10.4 FL — SIGNIFICANT CHANGE UP (ref 7.4–10.4)
POTASSIUM SERPL-MCNC: 4.1 MMOL/L — SIGNIFICANT CHANGE UP (ref 3.5–5)
POTASSIUM SERPL-SCNC: 4.1 MMOL/L — SIGNIFICANT CHANGE UP (ref 3.5–5)
PROT SERPL-MCNC: 6.5 G/DL — SIGNIFICANT CHANGE UP (ref 6–8)
RBC # BLD: 4.02 M/UL — LOW (ref 4.7–6.1)
RBC # FLD: 13.2 % — SIGNIFICANT CHANGE UP (ref 11.5–14.5)
SODIUM SERPL-SCNC: 143 MMOL/L — SIGNIFICANT CHANGE UP (ref 135–146)
WBC # BLD: 7.84 K/UL — SIGNIFICANT CHANGE UP (ref 4.8–10.8)
WBC # FLD AUTO: 7.84 K/UL — SIGNIFICANT CHANGE UP (ref 4.8–10.8)

## 2024-05-26 PROCEDURE — 99232 SBSQ HOSP IP/OBS MODERATE 35: CPT

## 2024-05-26 RX ADMIN — ENOXAPARIN SODIUM 40 MILLIGRAM(S): 100 INJECTION SUBCUTANEOUS at 18:31

## 2024-05-26 RX ADMIN — TAMSULOSIN HYDROCHLORIDE 0.4 MILLIGRAM(S): 0.4 CAPSULE ORAL at 22:29

## 2024-05-26 RX ADMIN — Medication 1 APPLICATION(S): at 18:31

## 2024-05-26 RX ADMIN — Medication 1 APPLICATION(S): at 05:40

## 2024-05-26 RX ADMIN — Medication 100 MILLIGRAM(S): at 22:30

## 2024-05-26 RX ADMIN — Medication 100 MILLIGRAM(S): at 11:12

## 2024-05-26 RX ADMIN — Medication 100 MILLIGRAM(S): at 05:43

## 2024-05-26 RX ADMIN — Medication 1 TABLET(S): at 11:13

## 2024-05-26 RX ADMIN — Medication 81 MILLIGRAM(S): at 11:13

## 2024-05-27 ENCOUNTER — TRANSCRIPTION ENCOUNTER (OUTPATIENT)
Age: 69
End: 2024-05-27

## 2024-05-27 PROCEDURE — 99232 SBSQ HOSP IP/OBS MODERATE 35: CPT

## 2024-05-27 RX ORDER — CEPHALEXIN 500 MG
500 CAPSULE ORAL
Refills: 0 | Status: DISCONTINUED | OUTPATIENT
Start: 2024-05-27 | End: 2024-05-28

## 2024-05-27 RX ORDER — CEPHALEXIN 500 MG
1 CAPSULE ORAL
Qty: 0 | Refills: 0 | DISCHARGE
Start: 2024-05-27

## 2024-05-27 RX ADMIN — Medication 1 APPLICATION(S): at 17:16

## 2024-05-27 RX ADMIN — ENOXAPARIN SODIUM 40 MILLIGRAM(S): 100 INJECTION SUBCUTANEOUS at 11:13

## 2024-05-27 RX ADMIN — TAMSULOSIN HYDROCHLORIDE 0.4 MILLIGRAM(S): 0.4 CAPSULE ORAL at 21:41

## 2024-05-27 RX ADMIN — Medication 1 TABLET(S): at 11:12

## 2024-05-27 RX ADMIN — Medication 81 MILLIGRAM(S): at 11:12

## 2024-05-27 RX ADMIN — Medication 1 APPLICATION(S): at 05:04

## 2024-05-27 RX ADMIN — Medication 500 MILLIGRAM(S): at 17:16

## 2024-05-27 RX ADMIN — Medication 100 MILLIGRAM(S): at 05:04

## 2024-05-27 RX ADMIN — Medication 500 MILLIGRAM(S): at 11:12

## 2024-05-27 NOTE — DISCHARGE NOTE PROVIDER - HOSPITAL COURSE
67 yo male patient with PMHx of BPH, PVD, right foot Charcot deformity, varicose veins s/p EVLT presenting for right leg erythema. history goes back to 2 days ago when patient was seen by his PMD who noticed erythema on his right lower leg -> prescribed him keflex without improvement hence was sent in. patient does not remember when his erythema started as he has chronic skin changes. he denied fever, chills, pus, pain, abdominal pain, N/V, chest pain, SOB, urine or bowel changes. patient reported that it was not his first episode of cellulitis.      VITALS:   T(C): 36.4 (05-24-24 @ 16:45), Max: 36.6 (05-24-24 @ 13:32)  HR: 70 (05-24-24 @ 16:45) (69 - 70)  BP: 137/84 (05-24-24 @ 16:45) (124/61 - 137/84)  RR: 18 (05-24-24 @ 16:45) (16 - 18)  SpO2: 99% (05-24-24 @ 16:45) (99% - 99%)    in ED, vitals were WNLs. labs were significant for anemia (Hb12.2). patient received clindamycin 600 mg IV and admitted to medicine for further investigation    Hospital Course  1. Right lower leg cellulitis  nonpurulent  no sepsis, afebrile, no leukocytosis  only took 2 days of keflex - not a failure of the abx  improving with ancef - change to keflex and complete for another 7 days  Duplex right LE (5/25) - normal  Bcx NGTD  outpt f/u with PMD    2. Continue current management for BPH, PVD    Patient is clinically stable for discharge. 67 yo male patient with PMHx of BPH, PVD, right foot Charcot deformity, varicose veins s/p EVLT presenting for right leg erythema. history goes back to 2 days ago when patient was seen by his PMD who noticed erythema on his right lower leg -> prescribed him keflex without improvement hence was sent in. patient does not remember when his erythema started as he has chronic skin changes. he denied fever, chills, pus, pain, abdominal pain, N/V, chest pain, SOB, urine or bowel changes. patient reported that it was not his first episode of cellulitis.      VITALS:   T(C): 36.4 (05-24-24 @ 16:45), Max: 36.6 (05-24-24 @ 13:32)  HR: 70 (05-24-24 @ 16:45) (69 - 70)  BP: 137/84 (05-24-24 @ 16:45) (124/61 - 137/84)  RR: 18 (05-24-24 @ 16:45) (16 - 18)  SpO2: 99% (05-24-24 @ 16:45) (99% - 99%)    in ED, vitals were WNLs. labs were significant for anemia (Hb12.2). patient received clindamycin 600 mg IV and admitted to medicine for further investigation    Hospital Course  1. Right lower leg cellulitis  nonpurulent  no sepsis, afebrile, no leukocytosis  only took 2 days of keflex - not a failure of the abx  improving with ancef - change to keflex and complete for another 7 days  Duplex right LE (5/25) - normal  Bcx NGTD  outpt f/u with PMD    2. Continue current management for BPH, PVD    Patient is clinically stable for discharge.

## 2024-05-27 NOTE — DISCHARGE NOTE PROVIDER - CARE PROVIDER_API CALL
Arpita [address], Admin - Room [address] [place] [address]  Phone: [phone]  Fax: [phone]  Follow Up Time: 2 weeks

## 2024-05-27 NOTE — DISCHARGE NOTE PROVIDER - ATTENDING DISCHARGE PHYSICAL EXAMINATION:
patient seen and examined independently on morning rounds for the first time today, chart reviewed and discussed with medicine resident and agree with the above discharge note with the following addendum:    time spendt on discharge >30 minutes including coordination of discharge care    [facility] plan for f/u with outpatient pcp and to continue with oral keflex for total 7 day course for RLE Cellulitis--continue right leg elevation and pain control

## 2024-05-27 NOTE — DISCHARGE NOTE PROVIDER - NSDCMRMEDTOKEN_GEN_ALL_CORE_FT
acetaminophen 650 mg oral tablet, extended release: 2 tab(s) orally 2 times a day as needed for  mild pain  Acidophilus oral capsule: 1 cap(s) orally once a day  ammonium lactate 12% topical lotion: Apply topically to affected area 2 times a day to affected areas  aspirin 81 mg oral tablet, chewable: 1 tab(s) chewed once a day  cephalexin 500 mg oral capsule: 1 cap(s) orally 4 times a day Last day June 2  Flomax 0.4 mg oral capsule: 1 cap(s) orally once a day (at bedtime)   acetaminophen 325 mg oral tablet: 2 tab(s) orally 2 times a day as needed for  moderate pain  Acidophilus oral capsule: 1 cap(s) orally once a day  ammonium lactate 12% topical lotion: Apply topically to affected area 2 times a day to affected areas  aspirin 81 mg oral tablet, chewable: 1 tab(s) chewed once a day  cephalexin 500 mg oral capsule: 1 cap(s) orally 4 times a day Last day June 2  Flomax 0.4 mg oral capsule: 1 cap(s) orally once a day (at bedtime)

## 2024-05-27 NOTE — DISCHARGE NOTE PROVIDER - NSDCCPCAREPLAN_GEN_ALL_CORE_FT
PRINCIPAL DISCHARGE DIAGNOSIS  Diagnosis: Cellulitis  Assessment and Plan of Treatment: You came to the hospital for cellulitis. You were treated with antibiotics and will be discharged on an oral course to be completed outpatient. You are clinically stable for discharge. Please continue to take all of your medications as prescribed and follow up with your PCP.  When should I seek immediate care?  Your wound gets larger and more painful.  You feel a crackling under your skin when you touch it.  You have purple dots or bumps on your skin, or you see bleeding under your skin.  You see red streaks coming from the infected area.  When should I call my doctor?  The red, warm, swollen area gets larger.  Your fever or pain does not go away or gets worse.  The area does not get smaller after 3 days of antibiotics.  You have questions or concerns about your condition or care.

## 2024-05-28 ENCOUNTER — TRANSCRIPTION ENCOUNTER (OUTPATIENT)
Age: 69
End: 2024-05-28

## 2024-05-28 VITALS
SYSTOLIC BLOOD PRESSURE: 143 MMHG | HEART RATE: 80 BPM | DIASTOLIC BLOOD PRESSURE: 78 MMHG | OXYGEN SATURATION: 94 % | RESPIRATION RATE: 18 BRPM | TEMPERATURE: 98 F

## 2024-05-28 PROCEDURE — 99239 HOSP IP/OBS DSCHRG MGMT >30: CPT

## 2024-05-28 RX ORDER — ACETAMINOPHEN 500 MG
2 TABLET ORAL
Qty: 120 | Refills: 0
Start: 2024-05-28 | End: 2024-06-26

## 2024-05-28 RX ORDER — ASPIRIN/CALCIUM CARB/MAGNESIUM 324 MG
1 TABLET ORAL
Qty: 30 | Refills: 0
Start: 2024-05-28 | End: 2024-06-26

## 2024-05-28 RX ORDER — TAMSULOSIN HYDROCHLORIDE 0.4 MG/1
1 CAPSULE ORAL
Qty: 30 | Refills: 0
Start: 2024-05-28 | End: 2024-06-26

## 2024-05-28 RX ORDER — LACTOBACILLUS ACIDOPHILUS 100MM CELL
1 CAPSULE ORAL
Qty: 30 | Refills: 0
Start: 2024-05-28 | End: 2024-06-26

## 2024-05-28 RX ORDER — CEPHALEXIN 500 MG
1 CAPSULE ORAL
Qty: 24 | Refills: 0
Start: 2024-05-28 | End: 2024-06-02

## 2024-05-28 RX ORDER — SOD,AMMONIUM,POTASSIUM LACTATE
1 CREAM (GRAM) TOPICAL
Qty: 1 | Refills: 0
Start: 2024-05-28 | End: 2024-06-26

## 2024-05-28 RX ADMIN — Medication 500 MILLIGRAM(S): at 05:09

## 2024-05-28 RX ADMIN — Medication 500 MILLIGRAM(S): at 11:30

## 2024-05-28 RX ADMIN — Medication 1 TABLET(S): at 11:30

## 2024-05-28 RX ADMIN — Medication 500 MILLIGRAM(S): at 00:06

## 2024-05-28 RX ADMIN — ENOXAPARIN SODIUM 40 MILLIGRAM(S): 100 INJECTION SUBCUTANEOUS at 12:24

## 2024-05-28 RX ADMIN — Medication 81 MILLIGRAM(S): at 11:30

## 2024-05-28 RX ADMIN — Medication 1 APPLICATION(S): at 05:08

## 2024-05-28 NOTE — DISCHARGE NOTE NURSING/CASE MANAGEMENT/SOCIAL WORK - NSDCPEFALRISK_GEN_ALL_CORE
For information on Fall & Injury Prevention, visit: [email or web address] OR  [email or web address] OR  https://www.cdc.gov/steadi/patient.html

## 2024-05-28 NOTE — DISCHARGE NOTE NURSING/CASE MANAGEMENT/SOCIAL WORK - PATIENT PORTAL LINK FT
You can access the FollowMyHealth Patient Portal offered by [facility] by registering at the following website: [email or web address]. By joining [facility]’s FollowMyHealth portal, you will also be able to view your health information using other applications (apps) compatible with our system.

## 2024-05-28 NOTE — PROGRESS NOTE ADULT - ASSESSMENT
69 y/o man with PMH of BPH, PVD, right foot Charcot deformity, varicose veins s/p EVLT and cellulitis 10 times (requiring hospitalization 3 times) now presents with right leg erythema. He started keflex since 2 days without improvement.    1. Right lower leg cellulitis  nonpurulent  no sepsis, afebrile, no leukocytosis  only took 2 days of keflex - not a failure of the abx  Duplex right LE (5/25) - normal  Bcx NGTD  improved with ancef - changed to keflex on 5/27 and complete for another 7 days (Last day June 2)  outpt f/u with PMD    2. Continue current management for BPH, PVD    3. DVT PPX: Lovenox    full code  
69 y/o man with PMH of BPH, PVD, right foot Charcot deformity, varicose veins s/p EVLT and cellulitis 10 times (requiring hospitalization 3 times) now presents with right leg erythema. He started keflex since 2 days without improvement.    1. Right lower leg cellulitis  nonpurulent  no sepsis, afebrile, no leukocytosis  only took 2 days of keflex - not a failure of the abx  agree with ancef for now  f/u duplex right LE  if continues to improve, discharge on keflex x 7 more days  outpt f/u with PMD    2. Continue current management for BPH, PVD    3. DVT PPX: Lovenox    full code      
69 y/o man with PMH of BPH, PVD, right foot Charcot deformity, varicose veins s/p EVLT and cellulitis 10 times (requiring hospitalization 3 times) now presents with right leg erythema. He started keflex since 2 days without improvement.    1. Right lower leg cellulitis  nonpurulent  no sepsis, afebrile, no leukocytosis  only took 2 days of keflex - not a failure of the abx  improving with ancef - can change to keflex and complete for another 7 days  f/u duplex right LE  outpt f/u with PMD    2. Continue current management for BPH, PVD    3. DVT PPX: Lovenox    full code    pt from [facility] - can be discharged when accepted by the facility - 24-48hrs  social work informed of plan of care  
67 y/o man with PMH of BPH, PVD, right foot Charcot deformity, varicose veins s/p EVLT and cellulitis 10 times (requiring hospitalization 3 times) now presents with right leg erythema. He started keflex since 2 days without improvement.    1. Right lower leg cellulitis  nonpurulent  no sepsis, afebrile, no leukocytosis  only took 2 days of keflex - not a failure of the abx  improved with ancef - can change to keflex and complete for another 7 days  duplex right LE: no DVT b/l - pt with right groin LN 4.4x1.2 cm that should be followed up as outpt  outpt f/u with PMD    2. Continue current management for BPH, PVD    3. DVT PPX: Lovenox    full code    pt from [facility] - can be discharged when accepted by the facility - in 24hrs  social work informed of plan of care  
67 y/o man with PMH of BPH, PVD, right foot Charcot deformity, varicose veins s/p EVLT and cellulitis 10 times (requiring hospitalization 3 times) now presents with right leg erythema. He started keflex since 2 days without improvement.    1. Right lower leg cellulitis  nonpurulent  no sepsis, afebrile, no leukocytosis  only took 2 days of keflex - not a failure of the abx  agree with ancef for now  f/u duplex right LE  if continues to improve, discharge on keflex x 7 more days  outpt f/u with PMD    2. Continue current management for BPH, PVD    3. DVT PPX: Lovenox    full code

## 2024-05-28 NOTE — PROGRESS NOTE ADULT - SUBJECTIVE AND OBJECTIVE BOX
ALLANRO  68y Male    INTERVAL HPI/OVERNIGHT EVENTS:    pt states redness is improving today  no other complaints  no fever    T(F): 97.1 (05-25-24 @ 05:55), Max: 97.9 (05-24-24 @ 13:32)  HR: 76 (05-25-24 @ 05:55) (69 - 88)  BP: 158/87 (05-25-24 @ 05:55) (124/61 - 168/76)  RR: 18 (05-25-24 @ 07:54) (16 - 18)  SpO2: 99% (05-25-24 @ 07:54) (99% - 99%)    PHYSICAL EXAM:  GENERAL: NAD  HEAD:  Normocephalic  EYES:  conjunctiva and sclera clear  ENMT: Moist mucous membranes  NERVOUS SYSTEM:  Alert, awake, Good concentration  CHEST/LUNG: CTA b/l  HEART: Regular rate and rhythm  ABDOMEN: Soft, Nontender, Nondistended  EXTREMITIES:   No edema LE  SKIN: right LE with area of erythema up to knee, nontender, chronic venous stasis changes of lower leg, ankle    Consultant(s) Notes Reviewed:  [x ] YES  [ ] NO  Care Discussed with Consultants/Other Providers [ x] YES  [ ] NO    MEDICATIONS  (STANDING):  ammonium lactate 12% Lotion 1 Application(s) Topical two times a day  aspirin  chewable 81 milliGRAM(s) Oral daily  ceFAZolin   IVPB 2000 milliGRAM(s) IV Intermittent every 8 hours  enoxaparin Injectable 40 milliGRAM(s) SubCutaneous every 24 hours  lactobacillus acidophilus 1 Tablet(s) Oral daily  tamsulosin 0.4 milliGRAM(s) Oral at bedtime    MEDICATIONS  (PRN):  acetaminophen     Tablet .. 650 milliGRAM(s) Oral every 6 hours PRN Temp greater or equal to 38C (100.4F), Mild Pain (1 - 3)  aluminum hydroxide/magnesium hydroxide/simethicone Suspension 30 milliLiter(s) Oral every 4 hours PRN Dyspepsia  melatonin 3 milliGRAM(s) Oral at bedtime PRN Insomnia  ondansetron Injectable 4 milliGRAM(s) IV Push every 8 hours PRN Nausea and/or Vomiting      LABS:                        11.9   6.69  )-----------( 254      ( 25 May 2024 06:11 )             36.6     05-25    143  |  108  |  10  ----------------------------<  92  4.1   |  29  |  0.7    Ca    8.5      25 May 2024 06:11  Mg     2.1     05-25    TPro  6.2  /  Alb  3.5  /  TBili  0.2  /  DBili  x   /  AST  13  /  ALT  11  /  AlkPhos  53  05-25        Lactate, Blood: 1.5 mmol/L (05-24 @ 14:31)            Case discussed with resident and RN on rounds today    Care discussed with pt        
  RO LEMUS  68y Male    INTERVAL HPI/OVERNIGHT EVENTS:    no complaints  no fever  right LE redness improving    T(F): 96.9 (05-26-24 @ 05:51), Max: 98 (05-25-24 @ 12:54)  HR: 108 (05-26-24 @ 05:51) (72 - 108)  BP: 151/87 (05-26-24 @ 05:51) (136/74 - 151/87)  RR: 18 (05-26-24 @ 05:51) (18 - 18)  SpO2: 100% (05-26-24 @ 05:51) (98% - 100%)    PHYSICAL EXAM:  GENERAL: NAD  HEAD:  Normocephalic  EYES:  conjunctiva and sclera clear  ENMT: Moist mucous membranes  NERVOUS SYSTEM:  Alert, awake, Good concentration  CHEST/LUNG: CTA b/l  HEART: Regular rate and rhythm  ABDOMEN: Soft, Nontender, Nondistended  EXTREMITIES:   No edema LE  SKIN: right LE with decreasing erythema  chronic venous stasis changes b/l    Consultant(s) Notes Reviewed:  [x ] YES  [ ] NO  Care Discussed with Consultants/Other Providers [ x] YES  [ ] NO    MEDICATIONS  (STANDING):  ammonium lactate 12% Lotion 1 Application(s) Topical two times a day  aspirin  chewable 81 milliGRAM(s) Oral daily  ceFAZolin   IVPB 2000 milliGRAM(s) IV Intermittent every 8 hours  enoxaparin Injectable 40 milliGRAM(s) SubCutaneous every 24 hours  lactobacillus acidophilus 1 Tablet(s) Oral daily  tamsulosin 0.4 milliGRAM(s) Oral at bedtime    MEDICATIONS  (PRN):  acetaminophen     Tablet .. 650 milliGRAM(s) Oral every 6 hours PRN Temp greater or equal to 38C (100.4F), Mild Pain (1 - 3)  aluminum hydroxide/magnesium hydroxide/simethicone Suspension 30 milliLiter(s) Oral every 4 hours PRN Dyspepsia  melatonin 3 milliGRAM(s) Oral at bedtime PRN Insomnia  ondansetron Injectable 4 milliGRAM(s) IV Push every 8 hours PRN Nausea and/or Vomiting      LABS:                        12.2   7.84  )-----------( 272      ( 26 May 2024 05:43 )             36.6     05-26    143  |  106  |  12  ----------------------------<  85  4.1   |  24  |  0.7    Ca    8.7      26 May 2024 05:43  Mg     2.2     05-26    TPro  6.5  /  Alb  3.6  /  TBili  0.3  /  DBili  x   /  AST  15  /  ALT  9   /  AlkPhos  58  05-26        Culture - Blood (collected 24 May 2024 14:31)  Source: .Blood Blood  Preliminary Report (25 May 2024 23:10):    No growth at 24 hours    Culture - Blood (collected 24 May 2024 14:31)  Source: .Blood Blood  Preliminary Report (25 May 2024 23:10):    No growth at 24 hours      Lactate, Blood: 1.5 mmol/L (05-24 @ 14:31)              Case discussed with RN today    Care discussed with pt        
SUBJECTIVE:    Patient is a 68y old Male who presents with a chief complaint of RLE cellulitis (27 May 2024 12:24)    Currently admitted to medicine with the primary diagnosis of Cellulitis       Today is hospital day 4d. This morning he is resting comfortably in bed and reports no new issues or overnight events.     PAST MEDICAL & SURGICAL HISTORY  History of BPH    PVD (peripheral vascular disease)    History of femoral angiogram      SOCIAL HISTORY:  Negative for smoking/alcohol/drug use.     ALLERGIES:  Vitamin D3 (Hives)  Augmentin (Hives)    MEDICATIONS:  STANDING MEDICATIONS  ammonium lactate 12% Lotion 1 Application(s) Topical two times a day  aspirin  chewable 81 milliGRAM(s) Oral daily  cephalexin 500 milliGRAM(s) Oral four times a day  enoxaparin Injectable 40 milliGRAM(s) SubCutaneous every 24 hours  lactobacillus acidophilus 1 Tablet(s) Oral daily  tamsulosin 0.4 milliGRAM(s) Oral at bedtime    PRN MEDICATIONS  acetaminophen     Tablet .. 650 milliGRAM(s) Oral every 6 hours PRN  aluminum hydroxide/magnesium hydroxide/simethicone Suspension 30 milliLiter(s) Oral every 4 hours PRN  melatonin 3 milliGRAM(s) Oral at bedtime PRN  ondansetron Injectable 4 milliGRAM(s) IV Push every 8 hours PRN    VITALS:   T(F): 97  HR: 72  BP: 147/79  RR: 18  SpO2: 96%    LABS:                        RADIOLOGY:    PHYSICAL EXAM:  GEN: No acute distress  LUNGS: Clear to auscultation bilaterally   HEART: S1/S2 present. RRR.   ABD: Soft, non-tender, non-distended. Bowel sounds present  EXT: right leg cellulitis  NEURO: AAOX3      
  RO LEMUS  68y Male    INTERVAL HPI/OVERNIGHT EVENTS:    no new complaints  no fever    T(F): 97 (05-27-24 @ 05:26), Max: 98 (05-26-24 @ 14:33)  HR: 73 (05-27-24 @ 05:26) (73 - 86)  BP: 126/70 (05-27-24 @ 05:26) (126/70 - 136/78)  RR: 18 (05-27-24 @ 05:26) (18 - 18)  SpO2: 98% (05-27-24 @ 05:26) (98% - 100%)  I&O's Summary    26 May 2024 07:01  -  27 May 2024 07:00  --------------------------------------------------------  IN: 340 mL / OUT: 800 mL / NET: -460 mL    PHYSICAL EXAM:  GENERAL: NAD  HEAD:  Normocephalic  EYES:  conjunctiva and sclera clear  ENMT: Moist mucous membranes  NERVOUS SYSTEM:  Alert, awake, Good concentration  CHEST/LUNG: CTA b/l  HEART: Regular rate and rhythm  ABDOMEN: Soft, Nontender, Nondistended  EXTREMITIES: mild edema right LE  SKIN: improving erythema of upper part of right lower leg, + mild area of erythema of lower leg/ankle  + chronic venous stasis changed    Consultant(s) Notes Reviewed:  [x ] YES  [ ] NO  Care Discussed with Consultants/Other Providers [ x] YES  [ ] NO    MEDICATIONS  (STANDING):  ammonium lactate 12% Lotion 1 Application(s) Topical two times a day  aspirin  chewable 81 milliGRAM(s) Oral daily  cephalexin 500 milliGRAM(s) Oral four times a day  enoxaparin Injectable 40 milliGRAM(s) SubCutaneous every 24 hours  lactobacillus acidophilus 1 Tablet(s) Oral daily  tamsulosin 0.4 milliGRAM(s) Oral at bedtime    MEDICATIONS  (PRN):  acetaminophen     Tablet .. 650 milliGRAM(s) Oral every 6 hours PRN Temp greater or equal to 38C (100.4F), Mild Pain (1 - 3)  aluminum hydroxide/magnesium hydroxide/simethicone Suspension 30 milliLiter(s) Oral every 4 hours PRN Dyspepsia  melatonin 3 milliGRAM(s) Oral at bedtime PRN Insomnia  ondansetron Injectable 4 milliGRAM(s) IV Push every 8 hours PRN Nausea and/or Vomiting      LABS:                        12.2   7.84  )-----------( 272      ( 26 May 2024 05:43 )             36.6     05-26    143  |  106  |  12  ----------------------------<  85  4.1   |  24  |  0.7    Ca    8.7      26 May 2024 05:43  Mg     2.2     05-26    TPro  6.5  /  Alb  3.6  /  TBili  0.3  /  DBili  x   /  AST  15  /  ALT  9   /  AlkPhos  58  05-26        Culture - Blood (collected 24 May 2024 14:31)  Source: .Blood Blood  Preliminary Report (26 May 2024 23:01):    No growth at 48 Hours    Culture - Blood (collected 24 May 2024 14:31)  Source: .Blood Blood  Preliminary Report (26 May 2024 23:01):    No growth at 48 Hours      Lactate, Blood: 1.5 mmol/L (05-24 @ 14:31)      < from: [facility] Duplex Lower Ext Vein Scan, Right (05.25.24 @ 16:57) >  Impression:    No evidence of deep venous thrombosis or superficial thrombophlebitis in   the right lower extremity.    Right groin lymph node is visualized measuring 4.4 x 1.2 cm.      < end of copied text >              Case discussed with residents and RN on rounds today    Care discussed with pt      
24H events:    Patient is a 68y old Male who presents with a chief complaint of RLE cellulitis (25 May 2024 12:46)    Primary diagnosis of Cellulitis      Day 1:  Day 2:  Day 3:     Today is hospital day 2d. This morning patient was seen and examined at bedside, resting comfortably in bed.    No acute or major events overnight.    Code Status:    Family communication:  Contact date:  Name of person contacted:  Relationship to patient:  Communication details:  What matters most:    PAST MEDICAL & SURGICAL HISTORY  History of BPH    PVD (peripheral vascular disease)    History of femoral angiogram      SOCIAL HISTORY:  Social History:  non smoker  non alcoholic (24 May 2024 16:39)      ALLERGIES:  Vitamin D3 (Hives)  Augmentin (Hives)    MEDICATIONS:  STANDING MEDICATIONS  ammonium lactate 12% Lotion 1 Application(s) Topical two times a day  aspirin  chewable 81 milliGRAM(s) Oral daily  ceFAZolin   IVPB 2000 milliGRAM(s) IV Intermittent every 8 hours  enoxaparin Injectable 40 milliGRAM(s) SubCutaneous every 24 hours  lactobacillus acidophilus 1 Tablet(s) Oral daily  tamsulosin 0.4 milliGRAM(s) Oral at bedtime    PRN MEDICATIONS  acetaminophen     Tablet .. 650 milliGRAM(s) Oral every 6 hours PRN  aluminum hydroxide/magnesium hydroxide/simethicone Suspension 30 milliLiter(s) Oral every 4 hours PRN  melatonin 3 milliGRAM(s) Oral at bedtime PRN  ondansetron Injectable 4 milliGRAM(s) IV Push every 8 hours PRN    VITALS:   T(F): 96.9  HR: 108  BP: 151/87  RR: 18  SpO2: 100%    PHYSICAL EXAM:  GENERAL: NAD  HEAD:  Normocephalic  EYES:  conjunctiva and sclera clear  ENMT: Moist mucous membranes  NERVOUS SYSTEM:  Alert, awake, Good concentration  CHEST/LUNG: CTA b/l  HEART: Regular rate and rhythm  ABDOMEN: Soft, Nontender, Nondistended  EXTREMITIES:   No edema LE  SKIN: right LE with area of erythema up to knee, nontender, chronic venous stasis changes of lower leg, ankle  LABS:                        11.9   6.69  )-----------( 254      ( 25 May 2024 06:11 )             36.6     05-25    143  |  108  |  10  ----------------------------<  92  4.1   |  29  |  0.7    Ca    8.5      25 May 2024 06:11  Mg     2.1     05-25    TPro  6.2  /  Alb  3.5  /  TBili  0.2  /  DBili  x   /  AST  13  /  ALT  11  /  AlkPhos  53  05-25      Urinalysis Basic - ( 25 May 2024 06:11 )    Color: x / Appearance: x / SG: x / pH: x  Gluc: 92 mg/dL / Ketone: x  / Bili: x / Urobili: x   Blood: x / Protein: x / Nitrite: x   Leuk Esterase: x / RBC: x / WBC x   Sq Epi: x / Non Sq Epi: x / Bacteria: x            Culture - Blood (collected 24 May 2024 14:31)  Source: .Blood Blood  Preliminary Report (25 May 2024 23:10):    No growth at 24 hours    Culture - Blood (collected 24 May 2024 14:31)  Source: .Blood Blood  Preliminary Report (25 May 2024 23:10):    No growth at 24 hours          RADIOLOGY:

## 2024-06-02 DIAGNOSIS — N40.0 BENIGN PROSTATIC HYPERPLASIA WITHOUT LOWER URINARY TRACT SYMPTOMS: ICD-10-CM

## 2024-06-02 DIAGNOSIS — D64.9 ANEMIA, UNSPECIFIED: ICD-10-CM

## 2024-06-02 DIAGNOSIS — Z79.82 LONG TERM (CURRENT) USE OF ASPIRIN: ICD-10-CM

## 2024-06-02 DIAGNOSIS — Z91.048 OTHER NONMEDICINAL SUBSTANCE ALLERGY STATUS: ICD-10-CM

## 2024-06-02 DIAGNOSIS — I73.9 PERIPHERAL VASCULAR DISEASE, UNSPECIFIED: ICD-10-CM

## 2024-06-02 DIAGNOSIS — I83.10 VARICOSE VEINS OF UNSPECIFIED LOWER EXTREMITY WITH INFLAMMATION: ICD-10-CM

## 2024-06-02 DIAGNOSIS — M14.671 CHARCOT'S JOINT, RIGHT ANKLE AND FOOT: ICD-10-CM

## 2024-06-02 DIAGNOSIS — R59.0 LOCALIZED ENLARGED LYMPH NODES: ICD-10-CM

## 2024-06-02 DIAGNOSIS — L03.115 CELLULITIS OF RIGHT LOWER LIMB: ICD-10-CM

## 2024-06-02 DIAGNOSIS — Z88.0 ALLERGY STATUS TO PENICILLIN: ICD-10-CM

## 2024-06-19 RX ORDER — LACTOBACILLUS ACIDOPHILUS 100MM CELL
1 CAPSULE ORAL
Refills: 0 | DISCHARGE

## 2024-06-19 RX ORDER — TAMSULOSIN HYDROCHLORIDE 0.4 MG/1
1 CAPSULE ORAL
Refills: 0 | DISCHARGE

## 2024-06-19 RX ORDER — SOD,AMMONIUM,POTASSIUM LACTATE
1 CREAM (GRAM) TOPICAL
Refills: 0 | DISCHARGE

## 2024-06-19 RX ORDER — ASPIRIN/CALCIUM CARB/MAGNESIUM 324 MG
1 TABLET ORAL
Refills: 0 | DISCHARGE

## 2024-06-19 RX ORDER — ACETAMINOPHEN 500 MG
2 TABLET ORAL
Refills: 0 | DISCHARGE

## 2024-09-06 NOTE — ED ADULT TRIAGE NOTE - CCCP TRG CHIEF CMPLNT
Therapy with Vancomycin complete and/or consult discontinued by provider.  Pharmacy will sign off, please re-consult as needed.    bloody urine

## 2024-09-16 ENCOUNTER — EMERGENCY (EMERGENCY)
Facility: HOSPITAL | Age: 69
LOS: 0 days | Discharge: HOME | End: 2024-09-16
Attending: EMERGENCY MEDICINE
Payer: MEDICARE

## 2024-09-16 VITALS
OXYGEN SATURATION: 96 % | SYSTOLIC BLOOD PRESSURE: 156 MMHG | TEMPERATURE: 99 F | WEIGHT: 253.97 LBS | DIASTOLIC BLOOD PRESSURE: 67 MMHG | HEART RATE: 97 BPM | RESPIRATION RATE: 18 BRPM

## 2024-09-16 DIAGNOSIS — Z91.018 ALLERGY TO OTHER FOODS: ICD-10-CM

## 2024-09-16 DIAGNOSIS — Z98.890 OTHER SPECIFIED POSTPROCEDURAL STATES: ICD-10-CM

## 2024-09-16 DIAGNOSIS — I83.90 ASYMPTOMATIC VARICOSE VEINS OF UNSPECIFIED LOWER EXTREMITY: ICD-10-CM

## 2024-09-16 DIAGNOSIS — M14.671 CHARCOT'S JOINT, RIGHT ANKLE AND FOOT: ICD-10-CM

## 2024-09-16 DIAGNOSIS — M54.2 CERVICALGIA: ICD-10-CM

## 2024-09-16 DIAGNOSIS — N40.0 BENIGN PROSTATIC HYPERPLASIA WITHOUT LOWER URINARY TRACT SYMPTOMS: ICD-10-CM

## 2024-09-16 DIAGNOSIS — Z88.0 ALLERGY STATUS TO PENICILLIN: ICD-10-CM

## 2024-09-16 DIAGNOSIS — R51.9 HEADACHE, UNSPECIFIED: ICD-10-CM

## 2024-09-16 DIAGNOSIS — H10.029 OTHER MUCOPURULENT CONJUNCTIVITIS, UNSPECIFIED EYE: ICD-10-CM

## 2024-09-16 DIAGNOSIS — Z99.89 DEPENDENCE ON OTHER ENABLING MACHINES AND DEVICES: ICD-10-CM

## 2024-09-16 DIAGNOSIS — Z86.79 PERSONAL HISTORY OF OTHER DISEASES OF THE CIRCULATORY SYSTEM: Chronic | ICD-10-CM

## 2024-09-16 DIAGNOSIS — I73.9 PERIPHERAL VASCULAR DISEASE, UNSPECIFIED: ICD-10-CM

## 2024-09-16 PROBLEM — Z87.438 PERSONAL HISTORY OF OTHER DISEASES OF MALE GENITAL ORGANS: Chronic | Status: ACTIVE | Noted: 2024-05-24

## 2024-09-16 PROCEDURE — 70450 CT HEAD/BRAIN W/O DYE: CPT | Mod: 26,MC

## 2024-09-16 PROCEDURE — 99284 EMERGENCY DEPT VISIT MOD MDM: CPT | Mod: 25

## 2024-09-16 PROCEDURE — 70450 CT HEAD/BRAIN W/O DYE: CPT | Mod: MC

## 2024-09-16 PROCEDURE — 72125 CT NECK SPINE W/O DYE: CPT | Mod: MC

## 2024-09-16 PROCEDURE — 72125 CT NECK SPINE W/O DYE: CPT | Mod: 26,MC

## 2024-09-16 PROCEDURE — 99284 EMERGENCY DEPT VISIT MOD MDM: CPT

## 2024-09-16 PROCEDURE — 99053 MED SERV 10PM-8AM 24 HR FAC: CPT

## 2024-09-16 RX ORDER — IBUPROFEN 600 MG
600 TABLET ORAL ONCE
Refills: 0 | Status: COMPLETED | OUTPATIENT
Start: 2024-09-16 | End: 2024-09-16

## 2024-09-16 RX ADMIN — Medication 600 MILLIGRAM(S): at 01:30

## 2024-09-16 NOTE — ED ADULT TRIAGE NOTE - ACCOMPANIED BY
"Subjective:       Patient ID: Jaylen Plascencia is a 36 y.o. male.    Vitals:  height is 5' 9" (1.753 m) and weight is 176 kg (388 lb) (abnormal). His oral temperature is 99.3 °F (37.4 °C). His blood pressure is 138/103 (abnormal) and his pulse is 96. His respiration is 16 and oxygen saturation is 96%.     Chief Complaint: Cough and Sinusitis    Cough   This is a new problem. The current episode started yesterday. The problem has been gradually worsening. The cough is productive of purulent sputum. Associated symptoms include postnasal drip, a sore throat and shortness of breath. Pertinent negatives include no chest pain, chills, ear pain, eye redness, fever, headaches, myalgias or wheezing. Nothing aggravates the symptoms. He has tried prescription cough suppressant for the symptoms. The treatment provided no relief.     Review of Systems   Constitution: Negative for chills, fever and malaise/fatigue.   HENT: Positive for postnasal drip and sore throat. Negative for congestion, ear pain and hoarse voice.    Eyes: Negative for discharge and redness.   Cardiovascular: Negative for chest pain, dyspnea on exertion and leg swelling.   Respiratory: Positive for cough and shortness of breath. Negative for sputum production and wheezing.    Musculoskeletal: Negative for myalgias.   Gastrointestinal: Negative for abdominal pain and nausea.   Neurological: Negative for headaches.       Objective:      Physical Exam   Constitutional: He is oriented to person, place, and time. He appears well-developed and well-nourished. He is cooperative.  Non-toxic appearance. He does not appear ill. No distress.   HENT:   Head: Normocephalic and atraumatic.   Right Ear: Hearing, external ear and ear canal normal. Tympanic membrane is injected.   Left Ear: Hearing, external ear and ear canal normal. Tympanic membrane is injected and erythematous.   Nose: Mucosal edema and rhinorrhea present. No nasal deformity. No epistaxis. Right sinus " exhibits frontal sinus tenderness. Right sinus exhibits no maxillary sinus tenderness. Left sinus exhibits frontal sinus tenderness. Left sinus exhibits no maxillary sinus tenderness.   Mouth/Throat: Uvula is midline and mucous membranes are normal. No trismus in the jaw. Normal dentition. No uvula swelling. Posterior oropharyngeal erythema present.       Eyes: Conjunctivae and lids are normal. No scleral icterus.   Sclera clear bilat   Neck: Trachea normal, full passive range of motion without pain and phonation normal. Neck supple.   Cardiovascular: Normal rate, regular rhythm, normal heart sounds, intact distal pulses and normal pulses.    Pulmonary/Chest: Effort normal and breath sounds normal. No respiratory distress.   Abdominal: Soft. Normal appearance and bowel sounds are normal. He exhibits no distension. There is no tenderness.   Musculoskeletal: Normal range of motion. He exhibits no edema or deformity.   Neurological: He is alert and oriented to person, place, and time. He exhibits normal muscle tone. Coordination normal.   Skin: Skin is warm, dry and intact. He is not diaphoretic. No pallor.   Psychiatric: He has a normal mood and affect. His speech is normal and behavior is normal. Judgment and thought content normal. Cognition and memory are normal.   Nursing note and vitals reviewed.      Assessment:       1. Acute frontal sinusitis, recurrence not specified    2. Cough    3. Acute pharyngitis, unspecified etiology        Plan:         Acute frontal sinusitis, recurrence not specified  -     amoxicillin-clavulanate 875-125mg (AUGMENTIN) 875-125 mg per tablet; Take 1 tablet by mouth 2 (two) times daily.  Dispense: 20 tablet; Refill: 0  -     predniSONE (DELTASONE) 20 MG tablet; Take 1 tablet (20 mg total) by mouth once daily.  Dispense: 5 tablet; Refill: 0    Cough    Acute pharyngitis, unspecified etiology  -     amoxicillin-clavulanate 875-125mg (AUGMENTIN) 875-125 mg per tablet; Take 1 tablet by  mouth 2 (two) times daily.  Dispense: 20 tablet; Refill: 0  -     predniSONE (DELTASONE) 20 MG tablet; Take 1 tablet (20 mg total) by mouth once daily.  Dispense: 5 tablet; Refill: 0      Take meds       EMT/paramedic

## 2024-09-16 NOTE — ED PROVIDER NOTE - PHYSICAL EXAMINATION
Physical Exam    Vital Signs: I have reviewed the initial vital signs.  Constitutional: well-nourished, appears stated age, no acute distress  Eyes: Conjunctiva pink, Sclera clear, PERRLA, EOMI without pain. no nystagmus.   Cardiovascular: regular rate, regular rhythm, well-perfused extremities, radial pulses equal and 2+ b/l.   Respiratory: unlabored respiratory effort, clear to auscultation bilaterally no wheezing, rales and rhonchi. pt is speaking full sentences. no accessory muscle use.   Musculoskeletal: supple neck, no lower extremity edema, no calf tenderness, no midline tenderness, no palpable spinal step offs  Integumentary: warm, dry, no rash  Neurologic: awake, alert, cranial nerves II-XII grossly intact, extremities’ motor and sensory functions grossly intact. finger to nose intact. negative pronator drift. 5/5 strength throughout. steady gait.   Psychiatric: appropriate mood, appropriate affect

## 2024-09-16 NOTE — ED PROVIDER NOTE - OBJECTIVE STATEMENT
69-year-old male with a past medical history of BPH, PVD, right foot Charcot deformity, varicose veins status post EVLT presents to the ED from [facility] for evaluation of headache x 2 days.  Patient reports he feels the pain in the back of his head and sometimes the front of his head.  Patient reports loud noise makes the headache worse.  Patient reports he has been taking Tylenol which provides relief.  Patient reports his roommate told him not to mess around with the symptoms and to go get checked out.  Patient reports he usually walks with a walker.  Patient denies recent head trauma, use of blood thinners, LOC, visual changes, weakness, numbness, tingling, urinary or bowel retention or incontinence, fevers, weakness, numbness, tingling, urinary or bowel retention or incontinence.

## 2024-09-16 NOTE — ED PROVIDER NOTE - PATIENT PORTAL LINK FT
You can access the FollowMyHealth Patient Portal offered by [facility] by registering at the following website: [email or web address]. By joining [facility]’s FollowMyHealth portal, you will also be able to view your health information using other applications (apps) compatible with our system.

## 2024-09-16 NOTE — ED PROVIDER NOTE - ATTENDING APP SHARED VISIT CONTRIBUTION OF CARE
69-year-old male to ED with headache and neck pain.  No fevers no sick contacts or travels trauma.  Complaining of worsening symptoms for 2 days and so to ED for eval.  Lives at [facility] otherwise no other focal neurological symptoms.  No injury or fall.  No headache or back pain.

## 2024-09-16 NOTE — ED PROVIDER NOTE - PROGRESS NOTE DETAILS
FF: I discussed radiology results with pt. pt advised of strict return precautions discussed at bedside. agreeable to dc. f/u with pcp.

## 2024-09-16 NOTE — ED PROVIDER NOTE - CARE PROVIDER_API CALL
Sal Thacker  Internal Medicine  [address] [place] [id number]  Phone: [phone]  Fax: [phone]  Follow Up Time: 7-10 Days

## 2024-09-16 NOTE — ED ADULT NURSE NOTE - NSFALLUNIVINTERV_ED_ALL_ED
Bed/Stretcher in lowest position, wheels locked, appropriate side rails in place/Call bell, personal items and telephone in reach/Instruct patient to call for assistance before getting out of bed/chair/stretcher/Non-slip footwear applied when patient is off stretcher/[place] to call system/Physically safe environment - no spills, clutter or unnecessary equipment/Purposeful proactive rounding/Room/bathroom lighting operational, light cord in reach

## 2024-09-16 NOTE — ED ADULT NURSE NOTE - NSICDXPASTMEDICALHX_GEN_ALL_CORE_FT
PAST MEDICAL HISTORY:  Cellulitis     Charcot ankle, right     Enlarged prostate BPH    History of BPH     HTN (hypertension)     Hypercholesteremia     PVD (peripheral vascular disease)

## 2025-01-14 NOTE — ED PROVIDER NOTE - CHIEF COMPLAINT
Switch Hydrea to 500mg Monday, Wednesday, Fridays    Blood work monthly    Return in 3 months for follow-up. Labs done 1 week before visit.  
The patient is a 68y Male complaining of suture/staple removal.

## 2025-03-18 NOTE — ED PROVIDER NOTE - PSH
BP low  Will give IV albumin  Currently on midodrine 10 mg 3 times daily   No significant past surgical history

## 2025-04-16 ENCOUNTER — APPOINTMENT (OUTPATIENT)
Dept: ORTHOPEDIC SURGERY | Facility: CLINIC | Age: 70
End: 2025-04-16
Payer: MEDICARE

## 2025-04-16 DIAGNOSIS — M17.0 BILATERAL PRIMARY OSTEOARTHRITIS OF KNEE: ICD-10-CM

## 2025-04-16 PROCEDURE — 73562 X-RAY EXAM OF KNEE 3: CPT | Mod: 50

## 2025-04-16 PROCEDURE — 99203 OFFICE O/P NEW LOW 30 MIN: CPT
